# Patient Record
Sex: FEMALE | Race: ASIAN | Employment: UNEMPLOYED | URBAN - METROPOLITAN AREA
[De-identification: names, ages, dates, MRNs, and addresses within clinical notes are randomized per-mention and may not be internally consistent; named-entity substitution may affect disease eponyms.]

---

## 2020-01-01 ENCOUNTER — PATIENT OUTREACH (OUTPATIENT)
Dept: GASTROENTEROLOGY | Facility: CLINIC | Age: 50
End: 2020-01-01

## 2020-01-01 ENCOUNTER — TRANSFERRED RECORDS (OUTPATIENT)
Dept: HEALTH INFORMATION MANAGEMENT | Facility: CLINIC | Age: 50
End: 2020-01-01

## 2020-01-01 ENCOUNTER — ONCOLOGY VISIT (OUTPATIENT)
Dept: ONCOLOGY | Facility: CLINIC | Age: 50
End: 2020-01-01
Payer: MEDICARE

## 2020-01-01 ENCOUNTER — TELEPHONE (OUTPATIENT)
Dept: ONCOLOGY | Facility: CLINIC | Age: 50
End: 2020-01-01

## 2020-01-01 ENCOUNTER — VIRTUAL VISIT (OUTPATIENT)
Dept: INTERVENTIONAL RADIOLOGY/VASCULAR | Facility: CLINIC | Age: 50
End: 2020-01-01
Attending: INTERNAL MEDICINE
Payer: MEDICARE

## 2020-01-01 ENCOUNTER — PATIENT OUTREACH (OUTPATIENT)
Dept: ONCOLOGY | Facility: CLINIC | Age: 50
End: 2020-01-01

## 2020-01-01 ENCOUNTER — INFUSION THERAPY VISIT (OUTPATIENT)
Dept: INFUSION THERAPY | Facility: CLINIC | Age: 50
End: 2020-01-01
Payer: MEDICARE

## 2020-01-01 ENCOUNTER — OFFICE VISIT (OUTPATIENT)
Dept: FAMILY MEDICINE | Facility: CLINIC | Age: 50
End: 2020-01-01
Payer: MEDICARE

## 2020-01-01 ENCOUNTER — TELEPHONE (OUTPATIENT)
Dept: FAMILY MEDICINE | Facility: CLINIC | Age: 50
End: 2020-01-01

## 2020-01-01 ENCOUNTER — PRE VISIT (OUTPATIENT)
Dept: INTERVENTIONAL RADIOLOGY/VASCULAR | Facility: CLINIC | Age: 50
End: 2020-01-01

## 2020-01-01 ENCOUNTER — OFFICE VISIT (OUTPATIENT)
Dept: URGENT CARE | Facility: URGENT CARE | Age: 50
End: 2020-01-01
Payer: MEDICARE

## 2020-01-01 ENCOUNTER — TELEPHONE (OUTPATIENT)
Dept: INFECTIOUS DISEASES | Facility: CLINIC | Age: 50
End: 2020-01-01

## 2020-01-01 ENCOUNTER — VIRTUAL VISIT (OUTPATIENT)
Dept: ONCOLOGY | Facility: CLINIC | Age: 50
End: 2020-01-01
Attending: INTERNAL MEDICINE
Payer: MEDICARE

## 2020-01-01 ENCOUNTER — OFFICE VISIT (OUTPATIENT)
Dept: SURGERY | Facility: CLINIC | Age: 50
End: 2020-01-01
Attending: INTERNAL MEDICINE
Payer: MEDICARE

## 2020-01-01 ENCOUNTER — ANCILLARY PROCEDURE (OUTPATIENT)
Dept: CT IMAGING | Facility: CLINIC | Age: 50
End: 2020-01-01
Attending: INTERNAL MEDICINE
Payer: MEDICARE

## 2020-01-01 ENCOUNTER — PRE VISIT (OUTPATIENT)
Dept: SURGERY | Facility: CLINIC | Age: 50
End: 2020-01-01

## 2020-01-01 ENCOUNTER — ANESTHESIA (OUTPATIENT)
Dept: SURGERY | Facility: AMBULATORY SURGERY CENTER | Age: 50
End: 2020-01-01

## 2020-01-01 ENCOUNTER — PATIENT OUTREACH (OUTPATIENT)
Dept: CARE COORDINATION | Facility: CLINIC | Age: 50
End: 2020-01-01

## 2020-01-01 ENCOUNTER — COMMUNICATION - HEALTHEAST (OUTPATIENT)
Dept: SCHEDULING | Facility: CLINIC | Age: 50
End: 2020-01-01

## 2020-01-01 ENCOUNTER — OFFICE VISIT (OUTPATIENT)
Dept: ONCOLOGY | Facility: CLINIC | Age: 50
End: 2020-01-01
Payer: MEDICARE

## 2020-01-01 ENCOUNTER — HOSPITAL ENCOUNTER (OUTPATIENT)
Facility: CLINIC | Age: 50
Setting detail: SPECIMEN
Discharge: HOME | End: 2020-08-26
Admitting: INTERNAL MEDICINE
Payer: MEDICARE

## 2020-01-01 ENCOUNTER — TELEPHONE (OUTPATIENT)
Dept: GASTROENTEROLOGY | Facility: CLINIC | Age: 50
End: 2020-01-01

## 2020-01-01 ENCOUNTER — ANESTHESIA EVENT (OUTPATIENT)
Dept: SURGERY | Facility: AMBULATORY SURGERY CENTER | Age: 50
End: 2020-01-01

## 2020-01-01 ENCOUNTER — HOSPITAL ENCOUNTER (OUTPATIENT)
Facility: AMBULATORY SURGERY CENTER | Age: 50
End: 2020-08-03
Attending: INTERNAL MEDICINE
Payer: MEDICARE

## 2020-01-01 ENCOUNTER — PATIENT OUTREACH (OUTPATIENT)
Dept: SURGERY | Facility: CLINIC | Age: 50
End: 2020-01-01

## 2020-01-01 ENCOUNTER — PRE VISIT (OUTPATIENT)
Dept: ONCOLOGY | Facility: CLINIC | Age: 50
End: 2020-01-01

## 2020-01-01 ENCOUNTER — DOCUMENTATION ONLY (OUTPATIENT)
Dept: OTHER | Facility: CLINIC | Age: 50
End: 2020-01-01

## 2020-01-01 ENCOUNTER — HOSPITAL ENCOUNTER (OUTPATIENT)
Facility: CLINIC | Age: 50
End: 2020-01-01
Attending: INTERNAL MEDICINE | Admitting: INTERNAL MEDICINE
Payer: MEDICARE

## 2020-01-01 ENCOUNTER — APPOINTMENT (OUTPATIENT)
Dept: MEDSURG UNIT | Facility: CLINIC | Age: 50
End: 2020-01-01
Attending: INTERNAL MEDICINE
Payer: MEDICARE

## 2020-01-01 ENCOUNTER — MEDICAL CORRESPONDENCE (OUTPATIENT)
Dept: HEALTH INFORMATION MANAGEMENT | Facility: CLINIC | Age: 50
End: 2020-01-01

## 2020-01-01 ENCOUNTER — HOSPITAL ENCOUNTER (INPATIENT)
Facility: CLINIC | Age: 50
LOS: 3 days | Discharge: HOSPICE | DRG: 871 | End: 2020-12-11
Attending: EMERGENCY MEDICINE | Admitting: HOSPITALIST
Payer: MEDICARE

## 2020-01-01 ENCOUNTER — TELEPHONE (OUTPATIENT)
Dept: SURGERY | Facility: CLINIC | Age: 50
End: 2020-01-01

## 2020-01-01 ENCOUNTER — APPOINTMENT (OUTPATIENT)
Dept: GENERAL RADIOLOGY | Facility: CLINIC | Age: 50
DRG: 871 | End: 2020-01-01
Attending: HOSPITALIST
Payer: MEDICARE

## 2020-01-01 ENCOUNTER — HOSPITAL ENCOUNTER (OUTPATIENT)
Facility: CLINIC | Age: 50
Setting detail: SPECIMEN
Discharge: HOME | End: 2020-09-02
Admitting: INTERNAL MEDICINE
Payer: MEDICARE

## 2020-01-01 ENCOUNTER — AMBULATORY - HEALTHEAST (OUTPATIENT)
Dept: FAMILY MEDICINE | Facility: CLINIC | Age: 50
End: 2020-01-01

## 2020-01-01 ENCOUNTER — VIRTUAL VISIT (OUTPATIENT)
Dept: ONCOLOGY | Facility: CLINIC | Age: 50
End: 2020-01-01
Attending: NURSE PRACTITIONER
Payer: MEDICARE

## 2020-01-01 ENCOUNTER — TELEPHONE (OUTPATIENT)
Dept: INTERVENTIONAL RADIOLOGY/VASCULAR | Facility: CLINIC | Age: 50
End: 2020-01-01

## 2020-01-01 ENCOUNTER — AMBULATORY - HEALTHEAST (OUTPATIENT)
Dept: OTHER | Facility: CLINIC | Age: 50
End: 2020-01-01

## 2020-01-01 ENCOUNTER — PRE VISIT (OUTPATIENT)
Dept: INFECTIOUS DISEASES | Facility: CLINIC | Age: 50
End: 2020-01-01

## 2020-01-01 ENCOUNTER — HEALTH MAINTENANCE LETTER (OUTPATIENT)
Age: 50
End: 2020-01-01

## 2020-01-01 ENCOUNTER — INFUSION THERAPY VISIT (OUTPATIENT)
Dept: INFUSION THERAPY | Facility: CLINIC | Age: 50
End: 2020-01-01
Attending: INTERNAL MEDICINE
Payer: MEDICARE

## 2020-01-01 ENCOUNTER — ANCILLARY PROCEDURE (OUTPATIENT)
Dept: ULTRASOUND IMAGING | Facility: CLINIC | Age: 50
End: 2020-01-01
Attending: NURSE PRACTITIONER
Payer: MEDICARE

## 2020-01-01 ENCOUNTER — ANCILLARY PROCEDURE (OUTPATIENT)
Dept: GENERAL RADIOLOGY | Facility: CLINIC | Age: 50
End: 2020-01-01
Attending: NURSE PRACTITIONER
Payer: MEDICARE

## 2020-01-01 ENCOUNTER — APPOINTMENT (OUTPATIENT)
Dept: INTERVENTIONAL RADIOLOGY/VASCULAR | Facility: CLINIC | Age: 50
End: 2020-01-01
Attending: CLINICAL NURSE SPECIALIST
Payer: MEDICARE

## 2020-01-01 ENCOUNTER — HOSPITAL ENCOUNTER (OUTPATIENT)
Dept: CT IMAGING | Facility: CLINIC | Age: 50
Discharge: HOME | End: 2020-07-13
Attending: NURSE PRACTITIONER | Admitting: NURSE PRACTITIONER
Payer: MEDICARE

## 2020-01-01 ENCOUNTER — VIRTUAL VISIT (OUTPATIENT)
Dept: INFECTIOUS DISEASES | Facility: CLINIC | Age: 50
End: 2020-01-01
Attending: STUDENT IN AN ORGANIZED HEALTH CARE EDUCATION/TRAINING PROGRAM
Payer: MEDICARE

## 2020-01-01 ENCOUNTER — HOSPITAL ENCOUNTER (OUTPATIENT)
Facility: CLINIC | Age: 50
Discharge: HOME | End: 2020-07-31
Attending: INTERNAL MEDICINE | Admitting: RADIOLOGY
Payer: MEDICARE

## 2020-01-01 VITALS
SYSTOLIC BLOOD PRESSURE: 108 MMHG | HEART RATE: 67 BPM | TEMPERATURE: 96.6 F | DIASTOLIC BLOOD PRESSURE: 74 MMHG | OXYGEN SATURATION: 97 % | WEIGHT: 250.2 LBS | RESPIRATION RATE: 16 BRPM | BODY MASS INDEX: 44.32 KG/M2

## 2020-01-01 VITALS
OXYGEN SATURATION: 96 % | RESPIRATION RATE: 20 BRPM | HEART RATE: 80 BPM | SYSTOLIC BLOOD PRESSURE: 125 MMHG | TEMPERATURE: 98.1 F | DIASTOLIC BLOOD PRESSURE: 84 MMHG | WEIGHT: 267.4 LBS | BODY MASS INDEX: 47.37 KG/M2

## 2020-01-01 VITALS
BODY MASS INDEX: 39.69 KG/M2 | TEMPERATURE: 98 F | OXYGEN SATURATION: 95 % | RESPIRATION RATE: 16 BRPM | DIASTOLIC BLOOD PRESSURE: 76 MMHG | WEIGHT: 231.2 LBS | HEART RATE: 78 BPM | SYSTOLIC BLOOD PRESSURE: 114 MMHG

## 2020-01-01 VITALS
HEART RATE: 69 BPM | RESPIRATION RATE: 16 BRPM | WEIGHT: 222.5 LBS | BODY MASS INDEX: 38.19 KG/M2 | TEMPERATURE: 97.9 F | SYSTOLIC BLOOD PRESSURE: 101 MMHG | OXYGEN SATURATION: 98 % | DIASTOLIC BLOOD PRESSURE: 69 MMHG

## 2020-01-01 VITALS
RESPIRATION RATE: 16 BRPM | OXYGEN SATURATION: 97 % | DIASTOLIC BLOOD PRESSURE: 75 MMHG | BODY MASS INDEX: 38.28 KG/M2 | SYSTOLIC BLOOD PRESSURE: 115 MMHG | HEART RATE: 71 BPM | TEMPERATURE: 97.4 F | WEIGHT: 223 LBS

## 2020-01-01 VITALS
HEART RATE: 67 BPM | DIASTOLIC BLOOD PRESSURE: 61 MMHG | WEIGHT: 228.8 LBS | SYSTOLIC BLOOD PRESSURE: 102 MMHG | TEMPERATURE: 97.8 F | BODY MASS INDEX: 39.27 KG/M2 | RESPIRATION RATE: 18 BRPM

## 2020-01-01 VITALS
TEMPERATURE: 97.1 F | RESPIRATION RATE: 16 BRPM | DIASTOLIC BLOOD PRESSURE: 70 MMHG | OXYGEN SATURATION: 98 % | HEART RATE: 86 BPM | SYSTOLIC BLOOD PRESSURE: 116 MMHG

## 2020-01-01 VITALS
HEART RATE: 66 BPM | WEIGHT: 226.6 LBS | DIASTOLIC BLOOD PRESSURE: 82 MMHG | TEMPERATURE: 97.8 F | RESPIRATION RATE: 16 BRPM | WEIGHT: 219 LBS | TEMPERATURE: 98.3 F | BODY MASS INDEX: 38.9 KG/M2 | BODY MASS INDEX: 37.59 KG/M2 | DIASTOLIC BLOOD PRESSURE: 64 MMHG | OXYGEN SATURATION: 98 % | HEART RATE: 73 BPM | OXYGEN SATURATION: 97 % | SYSTOLIC BLOOD PRESSURE: 118 MMHG | SYSTOLIC BLOOD PRESSURE: 96 MMHG

## 2020-01-01 VITALS
TEMPERATURE: 98.3 F | WEIGHT: 220.3 LBS | OXYGEN SATURATION: 96 % | SYSTOLIC BLOOD PRESSURE: 95 MMHG | BODY MASS INDEX: 37.81 KG/M2 | DIASTOLIC BLOOD PRESSURE: 65 MMHG | HEART RATE: 67 BPM

## 2020-01-01 VITALS
SYSTOLIC BLOOD PRESSURE: 122 MMHG | BODY MASS INDEX: 40.03 KG/M2 | DIASTOLIC BLOOD PRESSURE: 81 MMHG | RESPIRATION RATE: 18 BRPM | OXYGEN SATURATION: 97 % | WEIGHT: 233.2 LBS | TEMPERATURE: 97.9 F | HEART RATE: 69 BPM

## 2020-01-01 VITALS
SYSTOLIC BLOOD PRESSURE: 127 MMHG | RESPIRATION RATE: 16 BRPM | TEMPERATURE: 96.7 F | WEIGHT: 245 LBS | HEIGHT: 63 IN | BODY MASS INDEX: 43.41 KG/M2 | HEART RATE: 70 BPM | OXYGEN SATURATION: 98 % | DIASTOLIC BLOOD PRESSURE: 84 MMHG

## 2020-01-01 VITALS
OXYGEN SATURATION: 95 % | BODY MASS INDEX: 40.06 KG/M2 | HEART RATE: 64 BPM | SYSTOLIC BLOOD PRESSURE: 95 MMHG | DIASTOLIC BLOOD PRESSURE: 66 MMHG | WEIGHT: 233.4 LBS | TEMPERATURE: 98.1 F | RESPIRATION RATE: 18 BRPM

## 2020-01-01 VITALS
SYSTOLIC BLOOD PRESSURE: 108 MMHG | HEART RATE: 78 BPM | OXYGEN SATURATION: 97 % | DIASTOLIC BLOOD PRESSURE: 73 MMHG | WEIGHT: 220 LBS | TEMPERATURE: 98 F | HEIGHT: 64 IN | BODY MASS INDEX: 37.56 KG/M2 | RESPIRATION RATE: 18 BRPM

## 2020-01-01 VITALS
TEMPERATURE: 98.2 F | SYSTOLIC BLOOD PRESSURE: 110 MMHG | BODY MASS INDEX: 39.22 KG/M2 | OXYGEN SATURATION: 97 % | HEART RATE: 99 BPM | WEIGHT: 229.72 LBS | RESPIRATION RATE: 12 BRPM | DIASTOLIC BLOOD PRESSURE: 54 MMHG | HEIGHT: 64 IN

## 2020-01-01 VITALS
DIASTOLIC BLOOD PRESSURE: 85 MMHG | BODY MASS INDEX: 39.93 KG/M2 | HEART RATE: 87 BPM | WEIGHT: 232.6 LBS | SYSTOLIC BLOOD PRESSURE: 126 MMHG | OXYGEN SATURATION: 98 % | TEMPERATURE: 98.1 F

## 2020-01-01 VITALS — BODY MASS INDEX: 44.29 KG/M2 | WEIGHT: 250 LBS

## 2020-01-01 VITALS
OXYGEN SATURATION: 98 % | TEMPERATURE: 97.9 F | SYSTOLIC BLOOD PRESSURE: 122 MMHG | HEART RATE: 70 BPM | RESPIRATION RATE: 18 BRPM | DIASTOLIC BLOOD PRESSURE: 68 MMHG

## 2020-01-01 VITALS
TEMPERATURE: 98.4 F | BODY MASS INDEX: 36.9 KG/M2 | OXYGEN SATURATION: 97 % | DIASTOLIC BLOOD PRESSURE: 81 MMHG | HEART RATE: 93 BPM | SYSTOLIC BLOOD PRESSURE: 114 MMHG | WEIGHT: 215 LBS

## 2020-01-01 VITALS — BODY MASS INDEX: 41.83 KG/M2 | WEIGHT: 245 LBS | HEIGHT: 64 IN

## 2020-01-01 VITALS — WEIGHT: 223 LBS | BODY MASS INDEX: 38.28 KG/M2

## 2020-01-01 VITALS — WEIGHT: 217 LBS | BODY MASS INDEX: 37.25 KG/M2

## 2020-01-01 VITALS — HEART RATE: 86 BPM

## 2020-01-01 DIAGNOSIS — Z11.59 ENCOUNTER FOR SCREENING FOR OTHER VIRAL DISEASES: ICD-10-CM

## 2020-01-01 DIAGNOSIS — Z11.59 ENCOUNTER FOR SCREENING FOR OTHER VIRAL DISEASES: Primary | ICD-10-CM

## 2020-01-01 DIAGNOSIS — C22.1 CHOLANGIOCARCINOMA (H): Primary | ICD-10-CM

## 2020-01-01 DIAGNOSIS — C24.9 PRIMARY MALIGNANT NEOPLASM OF BILIARY TRACT (H): Primary | ICD-10-CM

## 2020-01-01 DIAGNOSIS — R79.89 ELEVATED LFTS: Primary | ICD-10-CM

## 2020-01-01 DIAGNOSIS — Z51.5 HOSPICE CARE PATIENT: ICD-10-CM

## 2020-01-01 DIAGNOSIS — C22.1 CHOLANGIOCARCINOMA (H): ICD-10-CM

## 2020-01-01 DIAGNOSIS — E87.6 HYPOKALEMIA: ICD-10-CM

## 2020-01-01 DIAGNOSIS — Z20.828 EXPOSURE TO SARS-ASSOCIATED CORONAVIRUS: ICD-10-CM

## 2020-01-01 DIAGNOSIS — E66.01 MORBID OBESITY (H): ICD-10-CM

## 2020-01-01 DIAGNOSIS — R10.84 ABDOMINAL PAIN, GENERALIZED: Primary | ICD-10-CM

## 2020-01-01 DIAGNOSIS — C24.9 PRIMARY MALIGNANT NEOPLASM OF BILIARY TRACT (H): ICD-10-CM

## 2020-01-01 DIAGNOSIS — R10.9 ABDOMINAL PAIN, UNSPECIFIED ABDOMINAL LOCATION: Primary | ICD-10-CM

## 2020-01-01 DIAGNOSIS — Z11.4 ENCOUNTER FOR SCREENING FOR HIV: ICD-10-CM

## 2020-01-01 DIAGNOSIS — R14.0 BLOATING: ICD-10-CM

## 2020-01-01 DIAGNOSIS — K59.00 CONSTIPATION, UNSPECIFIED CONSTIPATION TYPE: ICD-10-CM

## 2020-01-01 DIAGNOSIS — B19.10 REACTIVATION OF HEPATITIS B VIRAL HEPATITIS: ICD-10-CM

## 2020-01-01 DIAGNOSIS — B18.1 CHRONIC VIRAL HEPATITIS B WITHOUT DELTA AGENT AND WITHOUT COMA (H): ICD-10-CM

## 2020-01-01 DIAGNOSIS — R16.0 LIVER MASS: Primary | ICD-10-CM

## 2020-01-01 DIAGNOSIS — Z83.3 FAMILY HISTORY OF DIABETES MELLITUS: ICD-10-CM

## 2020-01-01 DIAGNOSIS — I81 PORTAL VEIN THROMBOSIS: ICD-10-CM

## 2020-01-01 DIAGNOSIS — R10.84 ABDOMINAL PAIN, GENERALIZED: ICD-10-CM

## 2020-01-01 DIAGNOSIS — Z01.818 PREOP GENERAL PHYSICAL EXAM: Primary | ICD-10-CM

## 2020-01-01 DIAGNOSIS — R73.9 HYPERGLYCEMIA: ICD-10-CM

## 2020-01-01 DIAGNOSIS — R32 URINARY INCONTINENCE, UNSPECIFIED TYPE: ICD-10-CM

## 2020-01-01 DIAGNOSIS — R79.89 ELEVATED LFTS: ICD-10-CM

## 2020-01-01 DIAGNOSIS — R17 ELEVATED BILIRUBIN: ICD-10-CM

## 2020-01-01 DIAGNOSIS — R11.0 NAUSEA: ICD-10-CM

## 2020-01-01 DIAGNOSIS — N30.01 ACUTE CYSTITIS WITH HEMATURIA: ICD-10-CM

## 2020-01-01 DIAGNOSIS — R16.0 LIVER MASS: ICD-10-CM

## 2020-01-01 DIAGNOSIS — E87.20 LACTIC ACIDOSIS: ICD-10-CM

## 2020-01-01 DIAGNOSIS — E11.9 TYPE 2 DIABETES MELLITUS WITHOUT COMPLICATION, WITHOUT LONG-TERM CURRENT USE OF INSULIN (H): ICD-10-CM

## 2020-01-01 DIAGNOSIS — B18.1 CHRONIC VIRAL HEPATITIS B WITHOUT DELTA AGENT AND WITHOUT COMA (H): Primary | ICD-10-CM

## 2020-01-01 DIAGNOSIS — K76.9 LIVER LESION, RIGHT LOBE: ICD-10-CM

## 2020-01-01 DIAGNOSIS — K76.9 LIVER LESION, RIGHT LOBE: Primary | ICD-10-CM

## 2020-01-01 DIAGNOSIS — K21.00 GASTROESOPHAGEAL REFLUX DISEASE WITH ESOPHAGITIS: ICD-10-CM

## 2020-01-01 DIAGNOSIS — R10.9 ABDOMINAL PAIN, UNSPECIFIED ABDOMINAL LOCATION: ICD-10-CM

## 2020-01-01 LAB
ABO + RH BLD: NORMAL
ABO + RH BLD: NORMAL
ALBUMIN SERPL-MCNC: 1.7 G/DL (ref 3.4–5)
ALBUMIN SERPL-MCNC: 1.9 G/DL (ref 3.4–5)
ALBUMIN SERPL-MCNC: 2.1 G/DL (ref 3.4–5)
ALBUMIN SERPL-MCNC: 2.2 G/DL (ref 3.4–5)
ALBUMIN SERPL-MCNC: 2.2 G/DL (ref 3.4–5)
ALBUMIN SERPL-MCNC: 2.3 G/DL (ref 3.4–5)
ALBUMIN SERPL-MCNC: 2.8 G/DL (ref 3.4–5)
ALBUMIN SERPL-MCNC: 2.9 G/DL (ref 3.4–5)
ALBUMIN SERPL-MCNC: 3 G/DL (ref 3.4–5)
ALBUMIN SERPL-MCNC: 3 G/DL (ref 3.4–5)
ALBUMIN SERPL-MCNC: 3.2 G/DL (ref 3.4–5)
ALBUMIN SERPL-MCNC: 3.3 G/DL (ref 3.4–5)
ALBUMIN SERPL-MCNC: 3.3 G/DL (ref 3.4–5)
ALBUMIN SERPL-MCNC: 3.8 G/DL (ref 3.4–5)
ALBUMIN UR-MCNC: NEGATIVE MG/DL
ALBUMIN UR-MCNC: NEGATIVE MG/DL
ALP SERPL-CCNC: 110 U/L (ref 40–150)
ALP SERPL-CCNC: 114 U/L (ref 40–150)
ALP SERPL-CCNC: 115 U/L (ref 40–150)
ALP SERPL-CCNC: 124 U/L (ref 40–150)
ALP SERPL-CCNC: 124 U/L (ref 40–150)
ALP SERPL-CCNC: 132 U/L (ref 40–150)
ALP SERPL-CCNC: 152 U/L (ref 40–150)
ALP SERPL-CCNC: 153 U/L (ref 40–150)
ALP SERPL-CCNC: 154 U/L (ref 40–150)
ALP SERPL-CCNC: 158 U/L (ref 40–150)
ALP SERPL-CCNC: 167 U/L (ref 40–150)
ALP SERPL-CCNC: 225 U/L (ref 40–150)
ALP SERPL-CCNC: 69 U/L (ref 40–150)
ALP SERPL-CCNC: 88 U/L (ref 40–150)
ALT SERPL W P-5'-P-CCNC: 116 U/L (ref 0–50)
ALT SERPL W P-5'-P-CCNC: 129 U/L (ref 0–50)
ALT SERPL W P-5'-P-CCNC: 213 U/L (ref 0–50)
ALT SERPL W P-5'-P-CCNC: 305 U/L (ref 0–50)
ALT SERPL W P-5'-P-CCNC: 33 U/L (ref 0–50)
ALT SERPL W P-5'-P-CCNC: 38 U/L (ref 0–50)
ALT SERPL W P-5'-P-CCNC: 43 U/L (ref 0–50)
ALT SERPL W P-5'-P-CCNC: 43 U/L (ref 0–50)
ALT SERPL W P-5'-P-CCNC: 51 U/L (ref 0–50)
ALT SERPL W P-5'-P-CCNC: 54 U/L (ref 0–50)
ALT SERPL W P-5'-P-CCNC: 68 U/L (ref 0–50)
ALT SERPL W P-5'-P-CCNC: 78 U/L (ref 0–50)
ALT SERPL W P-5'-P-CCNC: 80 U/L (ref 0–50)
ALT SERPL W P-5'-P-CCNC: 89 U/L (ref 0–50)
AMMONIA PLAS-SCNC: 46 UMOL/L (ref 10–50)
ANION GAP SERPL CALCULATED.3IONS-SCNC: 10 MMOL/L (ref 3–14)
ANION GAP SERPL CALCULATED.3IONS-SCNC: 3 MMOL/L (ref 3–14)
ANION GAP SERPL CALCULATED.3IONS-SCNC: 4 MMOL/L (ref 3–14)
ANION GAP SERPL CALCULATED.3IONS-SCNC: 5 MMOL/L (ref 3–14)
ANION GAP SERPL CALCULATED.3IONS-SCNC: 6 MMOL/L (ref 3–14)
ANION GAP SERPL CALCULATED.3IONS-SCNC: 6 MMOL/L (ref 3–14)
ANION GAP SERPL CALCULATED.3IONS-SCNC: 7 MMOL/L (ref 3–14)
ANISOCYTOSIS BLD QL SMEAR: ABNORMAL
APPEARANCE UR: CLEAR
APPEARANCE UR: CLEAR
APTT PPP: 36 SEC (ref 22–37)
AST SERPL W P-5'-P-CCNC: 124 U/L (ref 0–45)
AST SERPL W P-5'-P-CCNC: 279 U/L (ref 0–45)
AST SERPL W P-5'-P-CCNC: 407 U/L (ref 0–45)
AST SERPL W P-5'-P-CCNC: 41 U/L (ref 0–45)
AST SERPL W P-5'-P-CCNC: 45 U/L (ref 0–45)
AST SERPL W P-5'-P-CCNC: 53 U/L (ref 0–45)
AST SERPL W P-5'-P-CCNC: 57 U/L (ref 0–45)
AST SERPL W P-5'-P-CCNC: 58 U/L (ref 0–45)
AST SERPL W P-5'-P-CCNC: 61 U/L (ref 0–45)
AST SERPL W P-5'-P-CCNC: 68 U/L (ref 0–45)
AST SERPL W P-5'-P-CCNC: 70 U/L (ref 0–45)
AST SERPL W P-5'-P-CCNC: 71 U/L (ref 0–45)
AST SERPL W P-5'-P-CCNC: 72 U/L (ref 0–45)
AST SERPL W P-5'-P-CCNC: 90 U/L (ref 0–45)
B-HCG SERPL-ACNC: <1 IU/L (ref 0–5)
BACTERIA #/AREA URNS HPF: ABNORMAL /HPF
BACTERIA SPEC CULT: ABNORMAL
BACTERIA SPEC CULT: NO GROWTH
BACTERIA SPEC CULT: NO GROWTH
BACTERIA SPEC CULT: NORMAL
BASOPHILS # BLD AUTO: 0 10E9/L (ref 0–0.2)
BASOPHILS NFR BLD AUTO: 0 %
BASOPHILS NFR BLD AUTO: 0.2 %
BASOPHILS NFR BLD AUTO: 0.3 %
BASOPHILS NFR BLD AUTO: 0.4 %
BASOPHILS NFR BLD AUTO: 0.5 %
BASOPHILS NFR BLD AUTO: 0.5 %
BASOPHILS NFR BLD AUTO: 0.6 %
BASOPHILS NFR BLD AUTO: 0.6 %
BILIRUB DIRECT SERPL-MCNC: 2.4 MG/DL (ref 0–0.2)
BILIRUB SERPL-MCNC: 0.7 MG/DL (ref 0.2–1.3)
BILIRUB SERPL-MCNC: 0.8 MG/DL (ref 0.2–1.3)
BILIRUB SERPL-MCNC: 0.8 MG/DL (ref 0.2–1.3)
BILIRUB SERPL-MCNC: 1.1 MG/DL (ref 0.2–1.3)
BILIRUB SERPL-MCNC: 1.2 MG/DL (ref 0.2–1.3)
BILIRUB SERPL-MCNC: 1.3 MG/DL (ref 0.2–1.3)
BILIRUB SERPL-MCNC: 1.3 MG/DL (ref 0.2–1.3)
BILIRUB SERPL-MCNC: 1.4 MG/DL (ref 0.2–1.3)
BILIRUB SERPL-MCNC: 1.9 MG/DL (ref 0.2–1.3)
BILIRUB SERPL-MCNC: 2 MG/DL (ref 0.2–1.3)
BILIRUB SERPL-MCNC: 3.2 MG/DL (ref 0.2–1.3)
BILIRUB SERPL-MCNC: 4.6 MG/DL (ref 0.2–1.3)
BILIRUB UR QL STRIP: NEGATIVE
BILIRUB UR QL STRIP: NEGATIVE
BLD GP AB SCN SERPL QL: NORMAL
BLOOD BANK CMNT PATIENT-IMP: NORMAL
BUN SERPL-MCNC: 10 MG/DL (ref 7–30)
BUN SERPL-MCNC: 12 MG/DL (ref 7–30)
BUN SERPL-MCNC: 13 MG/DL (ref 7–30)
BUN SERPL-MCNC: 15 MG/DL (ref 7–30)
BUN SERPL-MCNC: 16 MG/DL (ref 7–30)
BUN SERPL-MCNC: 19 MG/DL (ref 7–30)
BUN SERPL-MCNC: 24 MG/DL (ref 7–30)
BUN SERPL-MCNC: 28 MG/DL (ref 7–30)
BUN SERPL-MCNC: 9 MG/DL (ref 7–30)
BUN SERPL-MCNC: 9 MG/DL (ref 7–30)
CALCIUM SERPL-MCNC: 7.8 MG/DL (ref 8.5–10.1)
CALCIUM SERPL-MCNC: 7.9 MG/DL (ref 8.5–10.1)
CALCIUM SERPL-MCNC: 8.1 MG/DL (ref 8.5–10.1)
CALCIUM SERPL-MCNC: 8.3 MG/DL (ref 8.5–10.1)
CALCIUM SERPL-MCNC: 8.3 MG/DL (ref 8.5–10.1)
CALCIUM SERPL-MCNC: 8.4 MG/DL (ref 8.5–10.1)
CALCIUM SERPL-MCNC: 8.5 MG/DL (ref 8.5–10.1)
CALCIUM SERPL-MCNC: 8.8 MG/DL (ref 8.5–10.1)
CALCIUM SERPL-MCNC: 8.8 MG/DL (ref 8.5–10.1)
CALCIUM SERPL-MCNC: 8.9 MG/DL (ref 8.5–10.1)
CALCIUM SERPL-MCNC: 9 MG/DL (ref 8.5–10.1)
CALCIUM SERPL-MCNC: 9.2 MG/DL (ref 8.5–10.1)
CHLORIDE SERPL-SCNC: 102 MMOL/L (ref 94–109)
CHLORIDE SERPL-SCNC: 103 MMOL/L (ref 94–109)
CHLORIDE SERPL-SCNC: 104 MMOL/L (ref 94–109)
CHLORIDE SERPL-SCNC: 105 MMOL/L (ref 94–109)
CHLORIDE SERPL-SCNC: 106 MMOL/L (ref 94–109)
CHLORIDE SERPL-SCNC: 106 MMOL/L (ref 94–109)
CHLORIDE SERPL-SCNC: 107 MMOL/L (ref 94–109)
CHLORIDE SERPL-SCNC: 108 MMOL/L (ref 94–109)
CHLORIDE SERPL-SCNC: 109 MMOL/L (ref 94–109)
CHLORIDE SERPL-SCNC: 98 MMOL/L (ref 94–109)
CHLORIDE SERPL-SCNC: 99 MMOL/L (ref 94–109)
CHLORIDE SERPL-SCNC: 99 MMOL/L (ref 94–109)
CO2 SERPL-SCNC: 25 MMOL/L (ref 20–32)
CO2 SERPL-SCNC: 25 MMOL/L (ref 20–32)
CO2 SERPL-SCNC: 26 MMOL/L (ref 20–32)
CO2 SERPL-SCNC: 27 MMOL/L (ref 20–32)
CO2 SERPL-SCNC: 27 MMOL/L (ref 20–32)
CO2 SERPL-SCNC: 28 MMOL/L (ref 20–32)
CO2 SERPL-SCNC: 28 MMOL/L (ref 20–32)
CO2 SERPL-SCNC: 29 MMOL/L (ref 20–32)
CO2 SERPL-SCNC: 30 MMOL/L (ref 20–32)
CO2 SERPL-SCNC: 34 MMOL/L (ref 20–32)
COLOR UR AUTO: YELLOW
COLOR UR AUTO: YELLOW
COPATH REPORT: NORMAL
COPATH REPORT: NORMAL
CREAT SERPL-MCNC: 0.55 MG/DL (ref 0.52–1.04)
CREAT SERPL-MCNC: 0.58 MG/DL (ref 0.52–1.04)
CREAT SERPL-MCNC: 0.63 MG/DL (ref 0.52–1.04)
CREAT SERPL-MCNC: 0.63 MG/DL (ref 0.52–1.04)
CREAT SERPL-MCNC: 0.66 MG/DL (ref 0.52–1.04)
CREAT SERPL-MCNC: 0.66 MG/DL (ref 0.52–1.04)
CREAT SERPL-MCNC: 0.68 MG/DL (ref 0.52–1.04)
CREAT SERPL-MCNC: 0.72 MG/DL (ref 0.52–1.04)
CREAT SERPL-MCNC: 0.72 MG/DL (ref 0.52–1.04)
CREAT SERPL-MCNC: 0.74 MG/DL (ref 0.52–1.04)
CREAT SERPL-MCNC: 0.74 MG/DL (ref 0.52–1.04)
CREAT SERPL-MCNC: 0.77 MG/DL (ref 0.52–1.04)
CREAT SERPL-MCNC: 0.8 MG/DL (ref 0.52–1.04)
CREAT SERPL-MCNC: 0.8 MG/DL (ref 0.52–1.04)
DIFFERENTIAL METHOD BLD: ABNORMAL
EOSINOPHIL # BLD AUTO: 0 10E9/L (ref 0–0.7)
EOSINOPHIL # BLD AUTO: 0.1 10E9/L (ref 0–0.7)
EOSINOPHIL # BLD AUTO: 0.2 10E9/L (ref 0–0.7)
EOSINOPHIL NFR BLD AUTO: 0 %
EOSINOPHIL NFR BLD AUTO: 0.5 %
EOSINOPHIL NFR BLD AUTO: 0.6 %
EOSINOPHIL NFR BLD AUTO: 1.5 %
EOSINOPHIL NFR BLD AUTO: 1.7 %
EOSINOPHIL NFR BLD AUTO: 1.8 %
EOSINOPHIL NFR BLD AUTO: 1.9 %
EOSINOPHIL NFR BLD AUTO: 2.2 %
EOSINOPHIL NFR BLD AUTO: 2.4 %
EOSINOPHIL NFR BLD AUTO: 2.5 %
EOSINOPHIL NFR BLD AUTO: 3.3 %
ERYTHROCYTE [DISTWIDTH] IN BLOOD BY AUTOMATED COUNT: 12.7 % (ref 10–15)
ERYTHROCYTE [DISTWIDTH] IN BLOOD BY AUTOMATED COUNT: 13 % (ref 10–15)
ERYTHROCYTE [DISTWIDTH] IN BLOOD BY AUTOMATED COUNT: 13.3 % (ref 10–15)
ERYTHROCYTE [DISTWIDTH] IN BLOOD BY AUTOMATED COUNT: 13.7 % (ref 10–15)
ERYTHROCYTE [DISTWIDTH] IN BLOOD BY AUTOMATED COUNT: 13.8 % (ref 10–15)
ERYTHROCYTE [DISTWIDTH] IN BLOOD BY AUTOMATED COUNT: 15 % (ref 10–15)
ERYTHROCYTE [DISTWIDTH] IN BLOOD BY AUTOMATED COUNT: 15.6 % (ref 10–15)
ERYTHROCYTE [DISTWIDTH] IN BLOOD BY AUTOMATED COUNT: 16.5 % (ref 10–15)
ERYTHROCYTE [DISTWIDTH] IN BLOOD BY AUTOMATED COUNT: 16.7 % (ref 10–15)
ERYTHROCYTE [DISTWIDTH] IN BLOOD BY AUTOMATED COUNT: 16.7 % (ref 10–15)
ERYTHROCYTE [DISTWIDTH] IN BLOOD BY AUTOMATED COUNT: 17.2 % (ref 10–15)
ERYTHROCYTE [DISTWIDTH] IN BLOOD BY AUTOMATED COUNT: 17.3 % (ref 10–15)
ERYTHROCYTE [DISTWIDTH] IN BLOOD BY AUTOMATED COUNT: 18.9 % (ref 10–15)
ERYTHROCYTE [DISTWIDTH] IN BLOOD BY AUTOMATED COUNT: 19.5 % (ref 10–15)
ERYTHROCYTE [DISTWIDTH] IN BLOOD BY AUTOMATED COUNT: 19.6 % (ref 10–15)
FIBRINOGEN PPP-MCNC: 270 MG/DL (ref 200–420)
FIBRINOGEN PPP-MCNC: 277 MG/DL (ref 200–420)
GFR SERPL CREATININE-BSD FRML MDRD: 87 ML/MIN/{1.73_M2}
GFR SERPL CREATININE-BSD FRML MDRD: 88 ML/MIN/{1.73_M2}
GFR SERPL CREATININE-BSD FRML MDRD: >90 ML/MIN/{1.73_M2}
GLUCOSE BLDC GLUCOMTR-MCNC: 138 MG/DL (ref 70–99)
GLUCOSE SERPL-MCNC: 101 MG/DL (ref 70–99)
GLUCOSE SERPL-MCNC: 104 MG/DL (ref 70–99)
GLUCOSE SERPL-MCNC: 109 MG/DL (ref 70–99)
GLUCOSE SERPL-MCNC: 110 MG/DL (ref 70–99)
GLUCOSE SERPL-MCNC: 119 MG/DL (ref 70–99)
GLUCOSE SERPL-MCNC: 120 MG/DL (ref 70–99)
GLUCOSE SERPL-MCNC: 126 MG/DL (ref 70–99)
GLUCOSE SERPL-MCNC: 136 MG/DL (ref 70–99)
GLUCOSE SERPL-MCNC: 142 MG/DL (ref 70–99)
GLUCOSE SERPL-MCNC: 149 MG/DL (ref 70–99)
GLUCOSE SERPL-MCNC: 182 MG/DL (ref 70–99)
GLUCOSE SERPL-MCNC: 183 MG/DL (ref 70–99)
GLUCOSE SERPL-MCNC: 95 MG/DL (ref 70–99)
GLUCOSE SERPL-MCNC: 97 MG/DL (ref 70–99)
GLUCOSE UR STRIP-MCNC: NEGATIVE MG/DL
GLUCOSE UR STRIP-MCNC: NEGATIVE MG/DL
HBA1C MFR BLD: 6.6 % (ref 0–5.6)
HBV DNA SERPL NAA+PROBE-ACNC: 6126 [IU]/ML
HBV DNA SERPL NAA+PROBE-ACNC: ABNORMAL [IU]/ML
HBV DNA SERPL NAA+PROBE-ACNC: NORMAL [IU]/ML
HBV DNA SERPL NAA+PROBE-LOG IU: 3.8 {LOG_IU}/ML
HBV DNA SERPL NAA+PROBE-LOG IU: 4.5 {LOG_IU}/ML
HBV DNA SERPL NAA+PROBE-LOG IU: NORMAL {LOG_IU}/ML
HBV E AB SERPL QL IA: POSITIVE
HBV E AG SERPL QL IA: NEGATIVE
HBV SURFACE AB SERPL IA-ACNC: 0 M[IU]/ML
HBV SURFACE AG SERPL QL IA: REACTIVE
HCG UR QL: NEGATIVE
HCT VFR BLD AUTO: 31.5 % (ref 35–47)
HCT VFR BLD AUTO: 34.4 % (ref 35–47)
HCT VFR BLD AUTO: 35.1 % (ref 35–47)
HCT VFR BLD AUTO: 35.8 % (ref 35–47)
HCT VFR BLD AUTO: 35.8 % (ref 35–47)
HCT VFR BLD AUTO: 36.1 % (ref 35–47)
HCT VFR BLD AUTO: 37.5 % (ref 35–47)
HCT VFR BLD AUTO: 38.1 % (ref 35–47)
HCT VFR BLD AUTO: 38.8 % (ref 35–47)
HCT VFR BLD AUTO: 40.4 % (ref 35–47)
HCT VFR BLD AUTO: 40.9 % (ref 35–47)
HCT VFR BLD AUTO: 42.4 % (ref 35–47)
HCT VFR BLD AUTO: 46.1 % (ref 35–47)
HCT VFR BLD AUTO: 46.1 % (ref 35–47)
HCT VFR BLD AUTO: 46.7 % (ref 35–47)
HCV AB SERPL QL IA: NONREACTIVE
HDV AB SER QL: NEGATIVE
HGB BLD-MCNC: 10.7 G/DL (ref 11.7–15.7)
HGB BLD-MCNC: 11.5 G/DL (ref 11.7–15.7)
HGB BLD-MCNC: 12 G/DL (ref 11.7–15.7)
HGB BLD-MCNC: 12 G/DL (ref 11.7–15.7)
HGB BLD-MCNC: 12.1 G/DL (ref 11.7–15.7)
HGB BLD-MCNC: 12.1 G/DL (ref 11.7–15.7)
HGB BLD-MCNC: 12.3 G/DL (ref 11.7–15.7)
HGB BLD-MCNC: 12.5 G/DL (ref 11.7–15.7)
HGB BLD-MCNC: 12.5 G/DL (ref 11.7–15.7)
HGB BLD-MCNC: 13.2 G/DL (ref 11.7–15.7)
HGB BLD-MCNC: 13.4 G/DL (ref 11.7–15.7)
HGB BLD-MCNC: 13.5 G/DL (ref 11.7–15.7)
HGB BLD-MCNC: 14.9 G/DL (ref 11.7–15.7)
HGB BLD-MCNC: 15 G/DL (ref 11.7–15.7)
HGB BLD-MCNC: 15.2 G/DL (ref 11.7–15.7)
HGB UR QL STRIP: ABNORMAL
HGB UR QL STRIP: NEGATIVE
HIV 1+2 AB+HIV1 P24 AG SERPL QL IA: NONREACTIVE
IMM GRANULOCYTES # BLD: 0 10E9/L (ref 0–0.4)
IMM GRANULOCYTES NFR BLD: 0 %
IMM GRANULOCYTES NFR BLD: 0.2 %
IMM GRANULOCYTES NFR BLD: 0.3 %
IMM GRANULOCYTES NFR BLD: 0.6 %
INR PPP: 1.19 (ref 0.86–1.14)
INR PPP: 1.36 (ref 0.86–1.14)
INR PPP: 1.67 (ref 0.86–1.14)
INR PPP: 1.84 (ref 0.86–1.14)
INTERPRETATION ECG - MUSE: NORMAL
KETONES UR STRIP-MCNC: NEGATIVE MG/DL
KETONES UR STRIP-MCNC: NEGATIVE MG/DL
LAB SCANNED RESULT: NORMAL
LAB SCANNED RESULT: NORMAL
LABORATORY COMMENT REPORT: NORMAL
LABORATORY COMMENT REPORT: NORMAL
LACTATE BLD-SCNC: 2.4 MMOL/L (ref 0.7–2)
LACTATE BLD-SCNC: 2.8 MMOL/L (ref 0.7–2)
LACTATE BLD-SCNC: 3.9 MMOL/L (ref 0.7–2)
LACTATE BLD-SCNC: 5.9 MMOL/L (ref 0.7–2)
LACTATE BLD-SCNC: 5.9 MMOL/L (ref 0.7–2)
LEUKOCYTE ESTERASE UR QL STRIP: ABNORMAL
LEUKOCYTE ESTERASE UR QL STRIP: NEGATIVE
LIPASE SERPL-CCNC: 152 U/L (ref 73–393)
LIPASE SERPL-CCNC: 55 U/L (ref 73–393)
LOCATION PERFORMED: NORMAL
LYMPHOCYTES # BLD AUTO: 0.3 10E9/L (ref 0.8–5.3)
LYMPHOCYTES # BLD AUTO: 0.9 10E9/L (ref 0.8–5.3)
LYMPHOCYTES # BLD AUTO: 1 10E9/L (ref 0.8–5.3)
LYMPHOCYTES # BLD AUTO: 1 10E9/L (ref 0.8–5.3)
LYMPHOCYTES # BLD AUTO: 1.1 10E9/L (ref 0.8–5.3)
LYMPHOCYTES # BLD AUTO: 1.2 10E9/L (ref 0.8–5.3)
LYMPHOCYTES # BLD AUTO: 1.4 10E9/L (ref 0.8–5.3)
LYMPHOCYTES # BLD AUTO: 1.7 10E9/L (ref 0.8–5.3)
LYMPHOCYTES # BLD AUTO: 1.9 10E9/L (ref 0.8–5.3)
LYMPHOCYTES NFR BLD AUTO: 20.3 %
LYMPHOCYTES NFR BLD AUTO: 21.7 %
LYMPHOCYTES NFR BLD AUTO: 22.4 %
LYMPHOCYTES NFR BLD AUTO: 22.5 %
LYMPHOCYTES NFR BLD AUTO: 22.6 %
LYMPHOCYTES NFR BLD AUTO: 24 %
LYMPHOCYTES NFR BLD AUTO: 24.2 %
LYMPHOCYTES NFR BLD AUTO: 25.4 %
LYMPHOCYTES NFR BLD AUTO: 26.5 %
LYMPHOCYTES NFR BLD AUTO: 28.1 %
LYMPHOCYTES NFR BLD AUTO: 28.5 %
LYMPHOCYTES NFR BLD AUTO: 30.2 %
LYMPHOCYTES NFR BLD AUTO: 30.8 %
LYMPHOCYTES NFR BLD AUTO: 5.1 %
MACROCYTES BLD QL SMEAR: PRESENT
MAGNESIUM SERPL-MCNC: 1.7 MG/DL (ref 1.6–2.3)
MAGNESIUM SERPL-MCNC: 1.8 MG/DL (ref 1.6–2.3)
MAGNESIUM SERPL-MCNC: 1.9 MG/DL (ref 1.6–2.3)
MAGNESIUM SERPL-MCNC: 2.1 MG/DL (ref 1.6–2.3)
MAGNESIUM SERPL-MCNC: 2.1 MG/DL (ref 1.6–2.3)
MAGNESIUM SERPL-MCNC: 2.2 MG/DL (ref 1.6–2.3)
MAGNESIUM SERPL-MCNC: 2.3 MG/DL (ref 1.6–2.3)
MAGNESIUM SERPL-MCNC: 2.3 MG/DL (ref 1.6–2.3)
MCH RBC QN AUTO: 28.8 PG (ref 26.5–33)
MCH RBC QN AUTO: 29 PG (ref 26.5–33)
MCH RBC QN AUTO: 29.1 PG (ref 26.5–33)
MCH RBC QN AUTO: 29.4 PG (ref 26.5–33)
MCH RBC QN AUTO: 29.8 PG (ref 26.5–33)
MCH RBC QN AUTO: 30.3 PG (ref 26.5–33)
MCH RBC QN AUTO: 30.4 PG (ref 26.5–33)
MCH RBC QN AUTO: 31.5 PG (ref 26.5–33)
MCH RBC QN AUTO: 32.2 PG (ref 26.5–33)
MCH RBC QN AUTO: 33 PG (ref 26.5–33)
MCH RBC QN AUTO: 33.8 PG (ref 26.5–33)
MCH RBC QN AUTO: 34.5 PG (ref 26.5–33)
MCH RBC QN AUTO: 34.7 PG (ref 26.5–33)
MCHC RBC AUTO-ENTMCNC: 31.8 G/DL (ref 31.5–36.5)
MCHC RBC AUTO-ENTMCNC: 32.2 G/DL (ref 31.5–36.5)
MCHC RBC AUTO-ENTMCNC: 32.3 G/DL (ref 31.5–36.5)
MCHC RBC AUTO-ENTMCNC: 32.3 G/DL (ref 31.5–36.5)
MCHC RBC AUTO-ENTMCNC: 32.5 G/DL (ref 31.5–36.5)
MCHC RBC AUTO-ENTMCNC: 32.5 G/DL (ref 31.5–36.5)
MCHC RBC AUTO-ENTMCNC: 32.8 G/DL (ref 31.5–36.5)
MCHC RBC AUTO-ENTMCNC: 32.8 G/DL (ref 31.5–36.5)
MCHC RBC AUTO-ENTMCNC: 33.2 G/DL (ref 31.5–36.5)
MCHC RBC AUTO-ENTMCNC: 33.4 G/DL (ref 31.5–36.5)
MCHC RBC AUTO-ENTMCNC: 33.5 G/DL (ref 31.5–36.5)
MCHC RBC AUTO-ENTMCNC: 33.5 G/DL (ref 31.5–36.5)
MCHC RBC AUTO-ENTMCNC: 33.8 G/DL (ref 31.5–36.5)
MCHC RBC AUTO-ENTMCNC: 34 G/DL (ref 31.5–36.5)
MCHC RBC AUTO-ENTMCNC: 34.2 G/DL (ref 31.5–36.5)
MCV RBC AUTO: 101 FL (ref 78–100)
MCV RBC AUTO: 102 FL (ref 78–100)
MCV RBC AUTO: 102 FL (ref 78–100)
MCV RBC AUTO: 88 FL (ref 78–100)
MCV RBC AUTO: 89 FL (ref 78–100)
MCV RBC AUTO: 90 FL (ref 78–100)
MCV RBC AUTO: 91 FL (ref 78–100)
MCV RBC AUTO: 91 FL (ref 78–100)
MCV RBC AUTO: 93 FL (ref 78–100)
MCV RBC AUTO: 94 FL (ref 78–100)
MCV RBC AUTO: 94 FL (ref 78–100)
MCV RBC AUTO: 96 FL (ref 78–100)
MCV RBC AUTO: 96 FL (ref 78–100)
MISMATCH REPAIR BY IMMUNOHISTOCHEMISTRY: NORMAL
MONOCYTES # BLD AUTO: 0.2 10E9/L (ref 0–1.3)
MONOCYTES # BLD AUTO: 0.4 10E9/L (ref 0–1.3)
MONOCYTES # BLD AUTO: 0.5 10E9/L (ref 0–1.3)
MONOCYTES # BLD AUTO: 0.8 10E9/L (ref 0–1.3)
MONOCYTES NFR BLD AUTO: 10.3 %
MONOCYTES NFR BLD AUTO: 10.3 %
MONOCYTES NFR BLD AUTO: 10.6 %
MONOCYTES NFR BLD AUTO: 10.8 %
MONOCYTES NFR BLD AUTO: 11.4 %
MONOCYTES NFR BLD AUTO: 11.6 %
MONOCYTES NFR BLD AUTO: 11.9 %
MONOCYTES NFR BLD AUTO: 14.2 %
MONOCYTES NFR BLD AUTO: 4.2 %
MONOCYTES NFR BLD AUTO: 6 %
MONOCYTES NFR BLD AUTO: 7.6 %
MONOCYTES NFR BLD AUTO: 7.9 %
MONOCYTES NFR BLD AUTO: 8.9 %
MONOCYTES NFR BLD AUTO: 8.9 %
MUCOUS THREADS #/AREA URNS LPF: PRESENT /LPF
MUCOUS THREADS #/AREA URNS LPF: PRESENT /LPF
NEUTROPHILS # BLD AUTO: 2 10E9/L (ref 1.6–8.3)
NEUTROPHILS # BLD AUTO: 2.1 10E9/L (ref 1.6–8.3)
NEUTROPHILS # BLD AUTO: 2.4 10E9/L (ref 1.6–8.3)
NEUTROPHILS # BLD AUTO: 2.6 10E9/L (ref 1.6–8.3)
NEUTROPHILS # BLD AUTO: 2.6 10E9/L (ref 1.6–8.3)
NEUTROPHILS # BLD AUTO: 3 10E9/L (ref 1.6–8.3)
NEUTROPHILS # BLD AUTO: 3.2 10E9/L (ref 1.6–8.3)
NEUTROPHILS # BLD AUTO: 3.3 10E9/L (ref 1.6–8.3)
NEUTROPHILS # BLD AUTO: 3.6 10E9/L (ref 1.6–8.3)
NEUTROPHILS # BLD AUTO: 3.6 10E9/L (ref 1.6–8.3)
NEUTROPHILS # BLD AUTO: 4.3 10E9/L (ref 1.6–8.3)
NEUTROPHILS # BLD AUTO: 4.7 10E9/L (ref 1.6–8.3)
NEUTROPHILS NFR BLD AUTO: 57.2 %
NEUTROPHILS NFR BLD AUTO: 59.4 %
NEUTROPHILS NFR BLD AUTO: 60 %
NEUTROPHILS NFR BLD AUTO: 60.3 %
NEUTROPHILS NFR BLD AUTO: 61 %
NEUTROPHILS NFR BLD AUTO: 61.4 %
NEUTROPHILS NFR BLD AUTO: 62.5 %
NEUTROPHILS NFR BLD AUTO: 63.1 %
NEUTROPHILS NFR BLD AUTO: 63.4 %
NEUTROPHILS NFR BLD AUTO: 63.4 %
NEUTROPHILS NFR BLD AUTO: 63.9 %
NEUTROPHILS NFR BLD AUTO: 66.8 %
NEUTROPHILS NFR BLD AUTO: 69.3 %
NEUTROPHILS NFR BLD AUTO: 90.7 %
NITRATE UR QL: NEGATIVE
NITRATE UR QL: NEGATIVE
NON-SQ EPI CELLS #/AREA URNS LPF: ABNORMAL /LPF
NRBC # BLD AUTO: 0 10*3/UL
NRBC # BLD AUTO: 0 10*3/UL
NRBC BLD AUTO-RTO: 0 /100
NRBC BLD AUTO-RTO: 0 /100
PH UR STRIP: 6 PH (ref 5–7)
PH UR STRIP: 6.5 PH (ref 5–7)
PLATELET # BLD AUTO: 102 10E9/L (ref 150–450)
PLATELET # BLD AUTO: 114 10E9/L (ref 150–450)
PLATELET # BLD AUTO: 115 10E9/L (ref 150–450)
PLATELET # BLD AUTO: 125 10E9/L (ref 150–450)
PLATELET # BLD AUTO: 134 10E9/L (ref 150–450)
PLATELET # BLD AUTO: 194 10E9/L (ref 150–450)
PLATELET # BLD AUTO: 50 10E9/L (ref 150–450)
PLATELET # BLD AUTO: 55 10E9/L (ref 150–450)
PLATELET # BLD AUTO: 65 10E9/L (ref 150–450)
PLATELET # BLD AUTO: 68 10E9/L (ref 150–450)
PLATELET # BLD AUTO: 75 10E9/L (ref 150–450)
PLATELET # BLD AUTO: 80 10E9/L (ref 150–450)
PLATELET # BLD AUTO: 83 10E9/L (ref 150–450)
PLATELET # BLD AUTO: 91 10E9/L (ref 150–450)
PLATELET # BLD AUTO: 99 10E9/L (ref 150–450)
PLATELET # BLD EST: ABNORMAL 10*3/UL
POTASSIUM SERPL-SCNC: 2.3 MMOL/L (ref 3.4–5.3)
POTASSIUM SERPL-SCNC: 2.7 MMOL/L (ref 3.4–5.3)
POTASSIUM SERPL-SCNC: 3.1 MMOL/L (ref 3.4–5.3)
POTASSIUM SERPL-SCNC: 3.2 MMOL/L (ref 3.4–5.3)
POTASSIUM SERPL-SCNC: 3.3 MMOL/L (ref 3.4–5.3)
POTASSIUM SERPL-SCNC: 3.3 MMOL/L (ref 3.4–5.3)
POTASSIUM SERPL-SCNC: 3.5 MMOL/L (ref 3.4–5.3)
POTASSIUM SERPL-SCNC: 3.6 MMOL/L (ref 3.4–5.3)
POTASSIUM SERPL-SCNC: 3.7 MMOL/L (ref 3.4–5.3)
POTASSIUM SERPL-SCNC: 3.7 MMOL/L (ref 3.4–5.3)
POTASSIUM SERPL-SCNC: 3.9 MMOL/L (ref 3.4–5.3)
PROCALCITONIN SERPL-MCNC: 7.52 NG/ML
PROT SERPL-MCNC: 7 G/DL (ref 6.8–8.8)
PROT SERPL-MCNC: 7.8 G/DL (ref 6.8–8.8)
PROT SERPL-MCNC: 7.9 G/DL (ref 6.8–8.8)
PROT SERPL-MCNC: 7.9 G/DL (ref 6.8–8.8)
PROT SERPL-MCNC: 8 G/DL (ref 6.8–8.8)
PROT SERPL-MCNC: 8 G/DL (ref 6.8–8.8)
PROT SERPL-MCNC: 8.2 G/DL (ref 6.8–8.8)
PROT SERPL-MCNC: 8.5 G/DL (ref 6.8–8.8)
PROT SERPL-MCNC: 8.5 G/DL (ref 6.8–8.8)
PROT SERPL-MCNC: 8.6 G/DL (ref 6.8–8.8)
PROT SERPL-MCNC: 8.6 G/DL (ref 6.8–8.8)
PROT SERPL-MCNC: 9.2 G/DL (ref 6.8–8.8)
PROT SERPL-MCNC: 9.6 G/DL (ref 6.8–8.8)
PROT SERPL-MCNC: 9.8 G/DL (ref 6.8–8.8)
RADIOLOGIST FLAGS: ABNORMAL
RBC # BLD AUTO: 3.08 10E12/L (ref 3.8–5.2)
RBC # BLD AUTO: 3.51 10E12/L (ref 3.8–5.2)
RBC # BLD AUTO: 3.55 10E12/L (ref 3.8–5.2)
RBC # BLD AUTO: 3.57 10E12/L (ref 3.8–5.2)
RBC # BLD AUTO: 3.64 10E12/L (ref 3.8–5.2)
RBC # BLD AUTO: 3.84 10E12/L (ref 3.8–5.2)
RBC # BLD AUTO: 4.11 10E12/L (ref 3.8–5.2)
RBC # BLD AUTO: 4.13 10E12/L (ref 3.8–5.2)
RBC # BLD AUTO: 4.29 10E12/L (ref 3.8–5.2)
RBC # BLD AUTO: 4.35 10E12/L (ref 3.8–5.2)
RBC # BLD AUTO: 4.6 10E12/L (ref 3.8–5.2)
RBC # BLD AUTO: 4.66 10E12/L (ref 3.8–5.2)
RBC # BLD AUTO: 5.1 10E12/L (ref 3.8–5.2)
RBC # BLD AUTO: 5.18 10E12/L (ref 3.8–5.2)
RBC # BLD AUTO: 5.22 10E12/L (ref 3.8–5.2)
RBC #/AREA URNS AUTO: 30 /HPF (ref 0–2)
RBC #/AREA URNS AUTO: ABNORMAL /HPF
RESULT: NORMAL
SARS-COV-2 PCR COMMENT: NORMAL
SARS-COV-2 RNA SPEC QL NAA+PROBE: NEGATIVE
SARS-COV-2 RNA SPEC QL NAA+PROBE: NORMAL
SARS-COV-2 RNA SPEC QL NAA+PROBE: NORMAL
SARS-COV-2 RNA SPEC QL NAA+PROBE: NOT DETECTED
SARS-COV-2 RNA SPEC QL NAA+PROBE: NOT DETECTED
SARS-COV-2 VIRUS SPECIMEN SOURCE: NORMAL
SEND OUTS MISC TEST CODE: NORMAL
SEND OUTS MISC TEST SPECIMEN: NORMAL
SODIUM SERPL-SCNC: 132 MMOL/L (ref 133–144)
SODIUM SERPL-SCNC: 134 MMOL/L (ref 133–144)
SODIUM SERPL-SCNC: 136 MMOL/L (ref 133–144)
SODIUM SERPL-SCNC: 136 MMOL/L (ref 133–144)
SODIUM SERPL-SCNC: 137 MMOL/L (ref 133–144)
SODIUM SERPL-SCNC: 137 MMOL/L (ref 133–144)
SODIUM SERPL-SCNC: 138 MMOL/L (ref 133–144)
SODIUM SERPL-SCNC: 139 MMOL/L (ref 133–144)
SODIUM SERPL-SCNC: 139 MMOL/L (ref 133–144)
SODIUM SERPL-SCNC: 140 MMOL/L (ref 133–144)
SODIUM SERPL-SCNC: 140 MMOL/L (ref 133–144)
SODIUM SERPL-SCNC: 142 MMOL/L (ref 133–144)
SOURCE: ABNORMAL
SOURCE: ABNORMAL
SP GR UR STRIP: 1.02 (ref 1–1.03)
SP GR UR STRIP: 1.02 (ref 1–1.03)
SPECIMEN EXP DATE BLD: NORMAL
SPECIMEN SOURCE: ABNORMAL
SPECIMEN SOURCE: NORMAL
SQUAMOUS #/AREA URNS AUTO: 2 /HPF (ref 0–1)
TEST NAME: NORMAL
TRANS CELLS #/AREA URNS HPF: <1 /HPF (ref 0–1)
TSH SERPL DL<=0.005 MIU/L-ACNC: 3.2 MU/L (ref 0.4–4)
UPPER GI ENDOSCOPY: NORMAL
UROBILINOGEN UR STRIP-ACNC: 0.2 EU/DL (ref 0.2–1)
UROBILINOGEN UR STRIP-MCNC: NORMAL MG/DL (ref 0–2)
WBC # BLD AUTO: 3.4 10E9/L (ref 4–11)
WBC # BLD AUTO: 3.5 10E9/L (ref 4–11)
WBC # BLD AUTO: 3.8 10E9/L (ref 4–11)
WBC # BLD AUTO: 4 10E9/L (ref 4–11)
WBC # BLD AUTO: 4.1 10E9/L (ref 4–11)
WBC # BLD AUTO: 4.1 10E9/L (ref 4–11)
WBC # BLD AUTO: 4.2 10E9/L (ref 4–11)
WBC # BLD AUTO: 4.6 10E9/L (ref 4–11)
WBC # BLD AUTO: 4.8 10E9/L (ref 4–11)
WBC # BLD AUTO: 5.2 10E9/L (ref 4–11)
WBC # BLD AUTO: 5.4 10E9/L (ref 4–11)
WBC # BLD AUTO: 5.9 10E9/L (ref 4–11)
WBC # BLD AUTO: 6.1 10E9/L (ref 4–11)
WBC # BLD AUTO: 6.2 10E9/L (ref 4–11)
WBC # BLD AUTO: 6.7 10E9/L (ref 4–11)
WBC #/AREA URNS AUTO: 2 /HPF (ref 0–5)
WBC #/AREA URNS AUTO: ABNORMAL /HPF

## 2020-01-01 PROCEDURE — 83735 ASSAY OF MAGNESIUM: CPT | Performed by: INTERNAL MEDICINE

## 2020-01-01 PROCEDURE — 85027 COMPLETE CBC AUTOMATED: CPT | Performed by: HOSPITALIST

## 2020-01-01 PROCEDURE — 250N000011 HC RX IP 250 OP 636: Performed by: CLINICAL NURSE SPECIALIST

## 2020-01-01 PROCEDURE — 86850 RBC ANTIBODY SCREEN: CPT | Performed by: EMERGENCY MEDICINE

## 2020-01-01 PROCEDURE — 80053 COMPREHEN METABOLIC PANEL: CPT | Performed by: EMERGENCY MEDICINE

## 2020-01-01 PROCEDURE — 88305 TISSUE EXAM BY PATHOLOGIST: CPT | Performed by: INTERNAL MEDICINE

## 2020-01-01 PROCEDURE — 88342 IMHCHEM/IMCYTCHM 1ST ANTB: CPT | Performed by: INTERNAL MEDICINE

## 2020-01-01 PROCEDURE — 85025 COMPLETE CBC W/AUTO DIFF WBC: CPT | Performed by: EMERGENCY MEDICINE

## 2020-01-01 PROCEDURE — 96413 CHEMO IV INFUSION 1 HR: CPT

## 2020-01-01 PROCEDURE — 85384 FIBRINOGEN ACTIVITY: CPT | Performed by: HOSPITALIST

## 2020-01-01 PROCEDURE — 99207 ZZC NO CHARGE NURSE ONLY: CPT

## 2020-01-01 PROCEDURE — 250N000011 HC RX IP 250 OP 636: Performed by: HOSPITALIST

## 2020-01-01 PROCEDURE — 40001009 ZZH VIDEO/TELEPHONE VISIT; NO CHARGE

## 2020-01-01 PROCEDURE — 96417 CHEMO IV INFUS EACH ADDL SEQ: CPT

## 2020-01-01 PROCEDURE — 87389 HIV-1 AG W/HIV-1&-2 AB AG IA: CPT | Performed by: INTERNAL MEDICINE

## 2020-01-01 PROCEDURE — 85025 COMPLETE CBC W/AUTO DIFF WBC: CPT | Performed by: INTERNAL MEDICINE

## 2020-01-01 PROCEDURE — 99207 ZZC NO CHARGE LOS: CPT

## 2020-01-01 PROCEDURE — 80053 COMPREHEN METABOLIC PANEL: CPT | Performed by: INTERNAL MEDICINE

## 2020-01-01 PROCEDURE — 96360 HYDRATION IV INFUSION INIT: CPT | Performed by: NURSE PRACTITIONER

## 2020-01-01 PROCEDURE — 82140 ASSAY OF AMMONIA: CPT | Performed by: EMERGENCY MEDICINE

## 2020-01-01 PROCEDURE — 83036 HEMOGLOBIN GLYCOSYLATED A1C: CPT | Performed by: NURSE PRACTITIONER

## 2020-01-01 PROCEDURE — 250N000011 HC RX IP 250 OP 636: Performed by: EMERGENCY MEDICINE

## 2020-01-01 PROCEDURE — 96368 THER/DIAG CONCURRENT INF: CPT | Performed by: EMERGENCY MEDICINE

## 2020-01-01 PROCEDURE — 96368 THER/DIAG CONCURRENT INF: CPT | Performed by: NURSE PRACTITIONER

## 2020-01-01 PROCEDURE — G0463 HOSPITAL OUTPT CLINIC VISIT: HCPCS | Mod: ZF

## 2020-01-01 PROCEDURE — 96361 HYDRATE IV INFUSION ADD-ON: CPT | Performed by: EMERGENCY MEDICINE

## 2020-01-01 PROCEDURE — 27210732 ZZH ACCESSORY CR1

## 2020-01-01 PROCEDURE — 87517 HEPATITIS B DNA QUANT: CPT | Performed by: INTERNAL MEDICINE

## 2020-01-01 PROCEDURE — 36415 COLL VENOUS BLD VENIPUNCTURE: CPT | Performed by: PHYSICIAN ASSISTANT

## 2020-01-01 PROCEDURE — 99207 PR NO CHARGE LOS: CPT

## 2020-01-01 PROCEDURE — 96365 THER/PROPH/DIAG IV INF INIT: CPT | Performed by: EMERGENCY MEDICINE

## 2020-01-01 PROCEDURE — 85610 PROTHROMBIN TIME: CPT | Performed by: EMERGENCY MEDICINE

## 2020-01-01 PROCEDURE — 99207 PR CDG-MDM COMPONENT: MEETS MODERATE - UP CODED: CPT | Performed by: STUDENT IN AN ORGANIZED HEALTH CARE EDUCATION/TRAINING PROGRAM

## 2020-01-01 PROCEDURE — 250N000011 HC RX IP 250 OP 636

## 2020-01-01 PROCEDURE — 93000 ELECTROCARDIOGRAM COMPLETE: CPT | Performed by: PHYSICIAN ASSISTANT

## 2020-01-01 PROCEDURE — 96366 THER/PROPH/DIAG IV INF ADDON: CPT | Performed by: EMERGENCY MEDICINE

## 2020-01-01 PROCEDURE — 96367 TX/PROPH/DG ADDL SEQ IV INF: CPT | Performed by: INTERNAL MEDICINE

## 2020-01-01 PROCEDURE — 25000128 H RX IP 250 OP 636: Performed by: INTERNAL MEDICINE

## 2020-01-01 PROCEDURE — 74177 CT ABD & PELVIS W/CONTRAST: CPT | Performed by: RADIOLOGY

## 2020-01-01 PROCEDURE — 85610 PROTHROMBIN TIME: CPT | Performed by: HOSPITALIST

## 2020-01-01 PROCEDURE — 83690 ASSAY OF LIPASE: CPT | Performed by: PHYSICIAN ASSISTANT

## 2020-01-01 PROCEDURE — 99233 SBSQ HOSP IP/OBS HIGH 50: CPT | Performed by: CLINICAL NURSE SPECIALIST

## 2020-01-01 PROCEDURE — 96367 TX/PROPH/DG ADDL SEQ IV INF: CPT | Performed by: NURSE PRACTITIONER

## 2020-01-01 PROCEDURE — 99207 PR NO CHARGE NURSE ONLY: CPT

## 2020-01-01 PROCEDURE — 27210909 ZZH NEEDLE CR5

## 2020-01-01 PROCEDURE — 99285 EMERGENCY DEPT VISIT HI MDM: CPT | Mod: 25 | Performed by: EMERGENCY MEDICINE

## 2020-01-01 PROCEDURE — 250N000013 HC RX MED GY IP 250 OP 250 PS 637: Performed by: HOSPITALIST

## 2020-01-01 PROCEDURE — 250N000013 HC RX MED GY IP 250 OP 250 PS 637: Performed by: CLINICAL NURSE SPECIALIST

## 2020-01-01 PROCEDURE — 36591 DRAW BLOOD OFF VENOUS DEVICE: CPT

## 2020-01-01 PROCEDURE — 99207 PR NO CHARGE LOS: CPT | Performed by: INTERNAL MEDICINE

## 2020-01-01 PROCEDURE — 81001 URINALYSIS AUTO W/SCOPE: CPT | Performed by: HOSPITALIST

## 2020-01-01 PROCEDURE — 83605 ASSAY OF LACTIC ACID: CPT | Performed by: HOSPITALIST

## 2020-01-01 PROCEDURE — 120N000003 HC R&B IMCU UMMC

## 2020-01-01 PROCEDURE — 96413 CHEMO IV INFUSION 1 HR: CPT | Performed by: INTERNAL MEDICINE

## 2020-01-01 PROCEDURE — 83605 ASSAY OF LACTIC ACID: CPT | Performed by: EMERGENCY MEDICINE

## 2020-01-01 PROCEDURE — 74177 CT ABD & PELVIS W/CONTRAST: CPT

## 2020-01-01 PROCEDURE — 81025 URINE PREGNANCY TEST: CPT | Performed by: NURSE PRACTITIONER

## 2020-01-01 PROCEDURE — 87800 DETECT AGNT MULT DNA DIREC: CPT | Performed by: EMERGENCY MEDICINE

## 2020-01-01 PROCEDURE — 99232 SBSQ HOSP IP/OBS MODERATE 35: CPT | Performed by: STUDENT IN AN ORGANIZED HEALTH CARE EDUCATION/TRAINING PROGRAM

## 2020-01-01 PROCEDURE — 250N000011 HC RX IP 250 OP 636: Performed by: STUDENT IN AN ORGANIZED HEALTH CARE EDUCATION/TRAINING PROGRAM

## 2020-01-01 PROCEDURE — 25000128 H RX IP 250 OP 636: Performed by: RADIOLOGY

## 2020-01-01 PROCEDURE — 80053 COMPREHEN METABOLIC PANEL: CPT | Performed by: HOSPITALIST

## 2020-01-01 PROCEDURE — 99214 OFFICE O/P EST MOD 30 MIN: CPT | Mod: 25 | Performed by: INTERNAL MEDICINE

## 2020-01-01 PROCEDURE — 85384 FIBRINOGEN ACTIVITY: CPT | Performed by: EMERGENCY MEDICINE

## 2020-01-01 PROCEDURE — 99214 OFFICE O/P EST MOD 30 MIN: CPT | Performed by: INTERNAL MEDICINE

## 2020-01-01 PROCEDURE — 83690 ASSAY OF LIPASE: CPT | Performed by: EMERGENCY MEDICINE

## 2020-01-01 PROCEDURE — 96417 CHEMO IV INFUS EACH ADDL SEQ: CPT | Performed by: NURSE PRACTITIONER

## 2020-01-01 PROCEDURE — 258N000003 HC RX IP 258 OP 636: Performed by: EMERGENCY MEDICINE

## 2020-01-01 PROCEDURE — 85025 COMPLETE CBC W/AUTO DIFF WBC: CPT | Performed by: NURSE PRACTITIONER

## 2020-01-01 PROCEDURE — 86692 HEPATITIS DELTA AGENT ANTBDY: CPT | Mod: 90 | Performed by: INTERNAL MEDICINE

## 2020-01-01 PROCEDURE — 99223 1ST HOSP IP/OBS HIGH 75: CPT | Performed by: CLINICAL NURSE SPECIALIST

## 2020-01-01 PROCEDURE — 96375 TX/PRO/DX INJ NEW DRUG ADDON: CPT | Performed by: INTERNAL MEDICINE

## 2020-01-01 PROCEDURE — 96417 CHEMO IV INFUS EACH ADDL SEQ: CPT | Performed by: INTERNAL MEDICINE

## 2020-01-01 PROCEDURE — 99291 CRITICAL CARE FIRST HOUR: CPT | Mod: 25 | Performed by: EMERGENCY MEDICINE

## 2020-01-01 PROCEDURE — 99000 SPECIMEN HANDLING OFFICE-LAB: CPT | Performed by: INTERNAL MEDICINE

## 2020-01-01 PROCEDURE — 88341 IMHCHEM/IMCYTCHM EA ADD ANTB: CPT | Performed by: INTERNAL MEDICINE

## 2020-01-01 PROCEDURE — 96413 CHEMO IV INFUSION 1 HR: CPT | Performed by: NURSE PRACTITIONER

## 2020-01-01 PROCEDURE — U0003 INFECTIOUS AGENT DETECTION BY NUCLEIC ACID (DNA OR RNA); SEVERE ACUTE RESPIRATORY SYNDROME CORONAVIRUS 2 (SARS-COV-2) (CORONAVIRUS DISEASE [COVID-19]), AMPLIFIED PROBE TECHNIQUE, MAKING USE OF HIGH THROUGHPUT TECHNOLOGIES AS DESCRIBED BY CMS-2020-01-R: HCPCS | Performed by: EMERGENCY MEDICINE

## 2020-01-01 PROCEDURE — 85610 PROTHROMBIN TIME: CPT | Performed by: RADIOLOGY

## 2020-01-01 PROCEDURE — 80050 GENERAL HEALTH PANEL: CPT | Performed by: NURSE PRACTITIONER

## 2020-01-01 PROCEDURE — 86706 HEP B SURFACE ANTIBODY: CPT | Performed by: INTERNAL MEDICINE

## 2020-01-01 PROCEDURE — 40000167 ZZH STATISTIC PP CARE STAGE 2

## 2020-01-01 PROCEDURE — 93005 ELECTROCARDIOGRAM TRACING: CPT | Performed by: EMERGENCY MEDICINE

## 2020-01-01 PROCEDURE — 258N000003 HC RX IP 258 OP 636: Performed by: STUDENT IN AN ORGANIZED HEALTH CARE EDUCATION/TRAINING PROGRAM

## 2020-01-01 PROCEDURE — 47000 NEEDLE BIOPSY OF LIVER PERQ: CPT

## 2020-01-01 PROCEDURE — 36415 COLL VENOUS BLD VENIPUNCTURE: CPT | Performed by: HOSPITALIST

## 2020-01-01 PROCEDURE — 25000125 ZZHC RX 250: Performed by: STUDENT IN AN ORGANIZED HEALTH CARE EDUCATION/TRAINING PROGRAM

## 2020-01-01 PROCEDURE — 99204 OFFICE O/P NEW MOD 45 MIN: CPT | Performed by: NURSE PRACTITIONER

## 2020-01-01 PROCEDURE — 96375 TX/PRO/DX INJ NEW DRUG ADDON: CPT | Performed by: NURSE PRACTITIONER

## 2020-01-01 PROCEDURE — 85025 COMPLETE CBC W/AUTO DIFF WBC: CPT | Performed by: RADIOLOGY

## 2020-01-01 PROCEDURE — 99215 OFFICE O/P EST HI 40 MIN: CPT | Performed by: INTERNAL MEDICINE

## 2020-01-01 PROCEDURE — 86707 HEPATITIS BE ANTIBODY: CPT | Mod: 90 | Performed by: INTERNAL MEDICINE

## 2020-01-01 PROCEDURE — 84132 ASSAY OF SERUM POTASSIUM: CPT | Performed by: INTERNAL MEDICINE

## 2020-01-01 PROCEDURE — 87186 SC STD MICRODIL/AGAR DIL: CPT | Performed by: EMERGENCY MEDICINE

## 2020-01-01 PROCEDURE — 80053 COMPREHEN METABOLIC PANEL: CPT | Performed by: NURSE PRACTITIONER

## 2020-01-01 PROCEDURE — 88307 TISSUE EXAM BY PATHOLOGIST: CPT | Performed by: INTERNAL MEDICINE

## 2020-01-01 PROCEDURE — 87350 HEPATITIS BE AG IA: CPT | Mod: 90 | Performed by: INTERNAL MEDICINE

## 2020-01-01 PROCEDURE — 87040 BLOOD CULTURE FOR BACTERIA: CPT | Performed by: EMERGENCY MEDICINE

## 2020-01-01 PROCEDURE — 93010 ELECTROCARDIOGRAM REPORT: CPT | Performed by: EMERGENCY MEDICINE

## 2020-01-01 PROCEDURE — 25800030 ZZH RX IP 258 OP 636: Performed by: PHYSICIAN ASSISTANT

## 2020-01-01 PROCEDURE — 25000125 ZZHC RX 250: Performed by: RADIOLOGY

## 2020-01-01 PROCEDURE — 36415 COLL VENOUS BLD VENIPUNCTURE: CPT | Performed by: INTERNAL MEDICINE

## 2020-01-01 PROCEDURE — U0003 INFECTIOUS AGENT DETECTION BY NUCLEIC ACID (DNA OR RNA); SEVERE ACUTE RESPIRATORY SYNDROME CORONAVIRUS 2 (SARS-COV-2) (CORONAVIRUS DISEASE [COVID-19]), AMPLIFIED PROBE TECHNIQUE, MAKING USE OF HIGH THROUGHPUT TECHNOLOGIES AS DESCRIBED BY CMS-2020-01-R: HCPCS | Performed by: INTERNAL MEDICINE

## 2020-01-01 PROCEDURE — 93308 TTE F-UP OR LMTD: CPT | Mod: 26 | Performed by: EMERGENCY MEDICINE

## 2020-01-01 PROCEDURE — 00000159 ZZHCL STATISTIC H-SEND OUTS PREP: Performed by: INTERNAL MEDICINE

## 2020-01-01 PROCEDURE — 76700 US EXAM ABDOM COMPLETE: CPT

## 2020-01-01 PROCEDURE — 258N000003 HC RX IP 258 OP 636: Performed by: HOSPITALIST

## 2020-01-01 PROCEDURE — 85610 PROTHROMBIN TIME: CPT | Performed by: INTERNAL MEDICINE

## 2020-01-01 PROCEDURE — U0003 INFECTIOUS AGENT DETECTION BY NUCLEIC ACID (DNA OR RNA); SEVERE ACUTE RESPIRATORY SYNDROME CORONAVIRUS 2 (SARS-COV-2) (CORONAVIRUS DISEASE [COVID-19]), AMPLIFIED PROBE TECHNIQUE, MAKING USE OF HIGH THROUGHPUT TECHNOLOGIES AS DESCRIBED BY CMS-2020-01-R: HCPCS | Performed by: CLINICAL NURSE SPECIALIST

## 2020-01-01 PROCEDURE — 36415 COLL VENOUS BLD VENIPUNCTURE: CPT | Performed by: NURSE PRACTITIONER

## 2020-01-01 PROCEDURE — 88365 INSITU HYBRIDIZATION (FISH): CPT | Performed by: INTERNAL MEDICINE

## 2020-01-01 PROCEDURE — 81001 URINALYSIS AUTO W/SCOPE: CPT | Performed by: NURSE PRACTITIONER

## 2020-01-01 PROCEDURE — 71045 X-RAY EXAM CHEST 1 VIEW: CPT

## 2020-01-01 PROCEDURE — 99204 OFFICE O/P NEW MOD 45 MIN: CPT | Mod: 95 | Performed by: INTERNAL MEDICINE

## 2020-01-01 PROCEDURE — 96365 THER/PROPH/DIAG IV INF INIT: CPT

## 2020-01-01 PROCEDURE — 71260 CT THORAX DX C+: CPT | Performed by: RADIOLOGY

## 2020-01-01 PROCEDURE — 85730 THROMBOPLASTIN TIME PARTIAL: CPT | Performed by: INTERNAL MEDICINE

## 2020-01-01 PROCEDURE — 96367 TX/PROPH/DG ADDL SEQ IV INF: CPT

## 2020-01-01 PROCEDURE — 99223 1ST HOSP IP/OBS HIGH 75: CPT | Mod: AI | Performed by: HOSPITALIST

## 2020-01-01 PROCEDURE — 87341 HEP B SURFACE AG NEUTRLZJ IA: CPT | Performed by: INTERNAL MEDICINE

## 2020-01-01 PROCEDURE — 80053 COMPREHEN METABOLIC PANEL: CPT | Performed by: PHYSICIAN ASSISTANT

## 2020-01-01 PROCEDURE — 36592 COLLECT BLOOD FROM PICC: CPT | Performed by: HOSPITALIST

## 2020-01-01 PROCEDURE — 99443 ZZC PHYSICIAN TELEPHONE EVALUATION 21-30 MIN: CPT | Performed by: INTERNAL MEDICINE

## 2020-01-01 PROCEDURE — 83735 ASSAY OF MAGNESIUM: CPT | Performed by: HOSPITALIST

## 2020-01-01 PROCEDURE — 87086 URINE CULTURE/COLONY COUNT: CPT | Performed by: NURSE PRACTITIONER

## 2020-01-01 PROCEDURE — 87077 CULTURE AEROBIC IDENTIFY: CPT | Performed by: EMERGENCY MEDICINE

## 2020-01-01 PROCEDURE — 71045 X-RAY EXAM CHEST 1 VIEW: CPT | Mod: 26 | Performed by: RADIOLOGY

## 2020-01-01 PROCEDURE — 87340 HEPATITIS B SURFACE AG IA: CPT | Performed by: INTERNAL MEDICINE

## 2020-01-01 PROCEDURE — 999N001017 HC STATISTIC GLUCOSE BY METER IP

## 2020-01-01 PROCEDURE — 84702 CHORIONIC GONADOTROPIN TEST: CPT | Mod: GZ | Performed by: RADIOLOGY

## 2020-01-01 PROCEDURE — 85025 COMPLETE CBC W/AUTO DIFF WBC: CPT | Performed by: PHYSICIAN ASSISTANT

## 2020-01-01 PROCEDURE — 82272 OCCULT BLD FECES 1-3 TESTS: CPT | Performed by: PHYSICIAN ASSISTANT

## 2020-01-01 PROCEDURE — 82248 BILIRUBIN DIRECT: CPT | Performed by: EMERGENCY MEDICINE

## 2020-01-01 PROCEDURE — 99152 MOD SED SAME PHYS/QHP 5/>YRS: CPT

## 2020-01-01 PROCEDURE — 86900 BLOOD TYPING SEROLOGIC ABO: CPT | Performed by: EMERGENCY MEDICINE

## 2020-01-01 PROCEDURE — 88333 PATH CONSLTJ SURG CYTO XM 1: CPT | Performed by: INTERNAL MEDICINE

## 2020-01-01 PROCEDURE — 99442 PR PHYSICIAN TELEPHONE EVALUATION 11-20 MIN: CPT | Mod: 95 | Performed by: STUDENT IN AN ORGANIZED HEALTH CARE EDUCATION/TRAINING PROGRAM

## 2020-01-01 PROCEDURE — 36415 COLL VENOUS BLD VENIPUNCTURE: CPT | Performed by: STUDENT IN AN ORGANIZED HEALTH CARE EDUCATION/TRAINING PROGRAM

## 2020-01-01 PROCEDURE — 25800030 ZZH RX IP 258 OP 636: Performed by: INTERNAL MEDICINE

## 2020-01-01 PROCEDURE — 99153 MOD SED SAME PHYS/QHP EA: CPT

## 2020-01-01 PROCEDURE — 83735 ASSAY OF MAGNESIUM: CPT | Performed by: NURSE PRACTITIONER

## 2020-01-01 PROCEDURE — 87040 BLOOD CULTURE FOR BACTERIA: CPT | Performed by: STUDENT IN AN ORGANIZED HEALTH CARE EDUCATION/TRAINING PROGRAM

## 2020-01-01 PROCEDURE — 84132 ASSAY OF SERUM POTASSIUM: CPT | Performed by: HOSPITALIST

## 2020-01-01 PROCEDURE — 25000128 H RX IP 250 OP 636: Performed by: STUDENT IN AN ORGANIZED HEALTH CARE EDUCATION/TRAINING PROGRAM

## 2020-01-01 PROCEDURE — 84145 PROCALCITONIN (PCT): CPT | Performed by: HOSPITALIST

## 2020-01-01 PROCEDURE — 93308 TTE F-UP OR LMTD: CPT | Performed by: EMERGENCY MEDICINE

## 2020-01-01 PROCEDURE — 86803 HEPATITIS C AB TEST: CPT | Performed by: INTERNAL MEDICINE

## 2020-01-01 PROCEDURE — 99238 HOSP IP/OBS DSCHRG MGMT 30/<: CPT | Performed by: STUDENT IN AN ORGANIZED HEALTH CARE EDUCATION/TRAINING PROGRAM

## 2020-01-01 PROCEDURE — 74019 RADEX ABDOMEN 2 VIEWS: CPT

## 2020-01-01 PROCEDURE — 86901 BLOOD TYPING SEROLOGIC RH(D): CPT | Performed by: EMERGENCY MEDICINE

## 2020-01-01 PROCEDURE — C9803 HOPD COVID-19 SPEC COLLECT: HCPCS | Performed by: EMERGENCY MEDICINE

## 2020-01-01 PROCEDURE — 99214 OFFICE O/P EST MOD 30 MIN: CPT | Performed by: PHYSICIAN ASSISTANT

## 2020-01-01 RX ORDER — SULFAMETHOXAZOLE/TRIMETHOPRIM 800-160 MG
1 TABLET ORAL 2 TIMES DAILY
Qty: 10 TABLET | Refills: 0 | Status: SHIPPED | OUTPATIENT
Start: 2020-01-01 | End: 2020-01-01

## 2020-01-01 RX ORDER — METHYLPREDNISOLONE SODIUM SUCCINATE 125 MG/2ML
125 INJECTION, POWDER, LYOPHILIZED, FOR SOLUTION INTRAMUSCULAR; INTRAVENOUS
Status: CANCELLED
Start: 2020-01-01

## 2020-01-01 RX ORDER — PROCHLORPERAZINE 25 MG
25 SUPPOSITORY, RECTAL RECTAL EVERY 12 HOURS PRN
Status: DISCONTINUED | OUTPATIENT
Start: 2020-01-01 | End: 2020-01-01 | Stop reason: HOSPADM

## 2020-01-01 RX ORDER — OXYCODONE HYDROCHLORIDE 10 MG/1
10 TABLET ORAL EVERY 6 HOURS PRN
Qty: 40 TABLET | Refills: 0 | Status: SHIPPED | OUTPATIENT
Start: 2020-01-01 | End: 2020-01-01

## 2020-01-01 RX ORDER — HEPARIN SODIUM (PORCINE) LOCK FLUSH IV SOLN 100 UNIT/ML 100 UNIT/ML
5 SOLUTION INTRAVENOUS
Status: DISCONTINUED | OUTPATIENT
Start: 2020-01-01 | End: 2020-01-01 | Stop reason: HOSPADM

## 2020-01-01 RX ORDER — DIPHENHYDRAMINE HYDROCHLORIDE 50 MG/ML
50 INJECTION INTRAMUSCULAR; INTRAVENOUS
Status: CANCELLED
Start: 2020-01-01

## 2020-01-01 RX ORDER — LORAZEPAM 2 MG/ML
0.5 INJECTION INTRAMUSCULAR EVERY 4 HOURS PRN
Status: CANCELLED
Start: 2020-01-01

## 2020-01-01 RX ORDER — LORAZEPAM 1 MG/1
1 TABLET ORAL EVERY 4 HOURS PRN
Status: DISCONTINUED | OUTPATIENT
Start: 2020-01-01 | End: 2020-01-01

## 2020-01-01 RX ORDER — AMOXICILLIN 250 MG
1 CAPSULE ORAL 2 TIMES DAILY
Status: DISCONTINUED | OUTPATIENT
Start: 2020-01-01 | End: 2020-01-01

## 2020-01-01 RX ORDER — LORAZEPAM 0.5 MG/1
.5-1 TABLET ORAL
Qty: 40 TABLET | Refills: 0 | Status: SHIPPED | OUTPATIENT
Start: 2020-01-01

## 2020-01-01 RX ORDER — POTASSIUM CHLORIDE 7.45 MG/ML
10 INJECTION INTRAVENOUS
Status: DISCONTINUED | OUTPATIENT
Start: 2020-01-01 | End: 2020-01-01

## 2020-01-01 RX ORDER — NALOXONE HYDROCHLORIDE 0.4 MG/ML
.1-.4 INJECTION, SOLUTION INTRAMUSCULAR; INTRAVENOUS; SUBCUTANEOUS
Status: CANCELLED | OUTPATIENT
Start: 2020-01-01

## 2020-01-01 RX ORDER — EPINEPHRINE 1 MG/ML
0.3 INJECTION, SOLUTION INTRAMUSCULAR; SUBCUTANEOUS EVERY 5 MIN PRN
Status: CANCELLED | OUTPATIENT
Start: 2020-01-01

## 2020-01-01 RX ORDER — PROCHLORPERAZINE MALEATE 10 MG
10 TABLET ORAL EVERY 6 HOURS PRN
Qty: 20 TABLET | Refills: 0 | Status: SHIPPED | OUTPATIENT
Start: 2020-01-01

## 2020-01-01 RX ORDER — HEPARIN SODIUM (PORCINE) LOCK FLUSH IV SOLN 100 UNIT/ML 100 UNIT/ML
5 SOLUTION INTRAVENOUS
Status: CANCELLED | OUTPATIENT
Start: 2020-01-01

## 2020-01-01 RX ORDER — DEXTROSE MONOHYDRATE 25 G/50ML
25-50 INJECTION, SOLUTION INTRAVENOUS
Status: DISCONTINUED | OUTPATIENT
Start: 2020-01-01 | End: 2020-01-01 | Stop reason: HOSPADM

## 2020-01-01 RX ORDER — ALBUTEROL SULFATE 90 UG/1
1-2 AEROSOL, METERED RESPIRATORY (INHALATION)
Status: CANCELLED
Start: 2020-01-01

## 2020-01-01 RX ORDER — ACETAMINOPHEN 325 MG/1
975 TABLET ORAL EVERY 8 HOURS
Status: DISCONTINUED | OUTPATIENT
Start: 2020-01-01 | End: 2020-01-01

## 2020-01-01 RX ORDER — ONDANSETRON 8 MG/1
8 TABLET, FILM COATED ORAL EVERY 8 HOURS PRN
Qty: 10 TABLET | Refills: 5 | Status: ON HOLD | OUTPATIENT
Start: 2020-01-01 | End: 2020-01-01

## 2020-01-01 RX ORDER — SIMETHICONE 125 MG
125 TABLET,CHEWABLE ORAL 2 TIMES DAILY PRN
Qty: 20 TABLET | Refills: 0 | Status: SHIPPED | OUTPATIENT
Start: 2020-01-01 | End: 2020-01-01

## 2020-01-01 RX ORDER — POTASSIUM CHLORIDE 750 MG/1
TABLET, EXTENDED RELEASE ORAL
Qty: 35 TABLET | Refills: 1 | Status: SHIPPED | OUTPATIENT
Start: 2020-01-01

## 2020-01-01 RX ORDER — PIPERACILLIN SODIUM, TAZOBACTAM SODIUM 3; .375 G/15ML; G/15ML
3.38 INJECTION, POWDER, LYOPHILIZED, FOR SOLUTION INTRAVENOUS EVERY 6 HOURS
Status: DISCONTINUED | OUTPATIENT
Start: 2020-01-01 | End: 2020-01-01

## 2020-01-01 RX ORDER — ONDANSETRON 4 MG/1
4 TABLET, ORALLY DISINTEGRATING ORAL EVERY 6 HOURS PRN
Qty: 20 TABLET | Refills: 0 | Status: SHIPPED | OUTPATIENT
Start: 2020-01-01

## 2020-01-01 RX ORDER — ONDANSETRON 4 MG/1
4 TABLET, ORALLY DISINTEGRATING ORAL EVERY 6 HOURS PRN
Status: DISCONTINUED | OUTPATIENT
Start: 2020-01-01 | End: 2020-01-01 | Stop reason: HOSPADM

## 2020-01-01 RX ORDER — ALBUTEROL SULFATE 0.83 MG/ML
2.5 SOLUTION RESPIRATORY (INHALATION)
Status: CANCELLED | OUTPATIENT
Start: 2020-01-01

## 2020-01-01 RX ORDER — CEFTRIAXONE 2 G/1
2 INJECTION, POWDER, FOR SOLUTION INTRAMUSCULAR; INTRAVENOUS EVERY 24 HOURS
Status: DISCONTINUED | OUTPATIENT
Start: 2020-01-01 | End: 2020-01-01

## 2020-01-01 RX ORDER — ONDANSETRON 4 MG/1
4 TABLET, ORALLY DISINTEGRATING ORAL EVERY 8 HOURS PRN
Qty: 20 TABLET | Refills: 0 | Status: ON HOLD | OUTPATIENT
Start: 2020-01-01 | End: 2020-01-01

## 2020-01-01 RX ORDER — HEPARIN SODIUM,PORCINE 10 UNIT/ML
5 VIAL (ML) INTRAVENOUS
Status: CANCELLED | OUTPATIENT
Start: 2020-01-01

## 2020-01-01 RX ORDER — LORAZEPAM 2 MG/ML
.5-1 INJECTION INTRAMUSCULAR
Status: DISCONTINUED | OUTPATIENT
Start: 2020-01-01 | End: 2020-01-01

## 2020-01-01 RX ORDER — HYDROCHLOROTHIAZIDE 25 MG/1
25 TABLET ORAL DAILY
Qty: 30 TABLET | Refills: 3 | Status: ON HOLD | OUTPATIENT
Start: 2020-01-01 | End: 2020-01-01

## 2020-01-01 RX ORDER — DIAZEPAM 5 MG
5 TABLET ORAL
COMMUNITY
Start: 2019-08-15 | End: 2020-01-01

## 2020-01-01 RX ORDER — PROCHLORPERAZINE MALEATE 10 MG
5 TABLET ORAL EVERY 6 HOURS PRN
Qty: 30 TABLET | Refills: 5 | Status: ON HOLD | OUTPATIENT
Start: 2020-01-01 | End: 2020-01-01

## 2020-01-01 RX ORDER — MIRTAZAPINE 15 MG/1
15 TABLET, FILM COATED ORAL
Status: ON HOLD | COMMUNITY
Start: 2019-08-15 | End: 2020-01-01

## 2020-01-01 RX ORDER — ONDANSETRON 2 MG/ML
4 INJECTION INTRAMUSCULAR; INTRAVENOUS
Status: DISCONTINUED | OUTPATIENT
Start: 2020-01-01 | End: 2020-01-01 | Stop reason: HOSPADM

## 2020-01-01 RX ORDER — PROCHLORPERAZINE MALEATE 5 MG
5 TABLET ORAL EVERY 6 HOURS PRN
Qty: 30 TABLET | Refills: 3 | Status: ON HOLD | OUTPATIENT
Start: 2020-01-01 | End: 2020-01-01

## 2020-01-01 RX ORDER — AMOXICILLIN 250 MG
2 CAPSULE ORAL 2 TIMES DAILY
Status: DISCONTINUED | OUTPATIENT
Start: 2020-01-01 | End: 2020-01-01

## 2020-01-01 RX ORDER — LORAZEPAM 0.5 MG/1
0.5 TABLET ORAL EVERY 4 HOURS PRN
Qty: 30 TABLET | Refills: 5 | Status: SHIPPED | OUTPATIENT
Start: 2020-01-01 | End: 2020-01-01

## 2020-01-01 RX ORDER — HYDROMORPHONE HYDROCHLORIDE 10 MG/ML
4-8 INJECTION INTRAMUSCULAR; INTRAVENOUS; SUBCUTANEOUS
Status: DISCONTINUED | OUTPATIENT
Start: 2020-01-01 | End: 2020-01-01

## 2020-01-01 RX ORDER — OXYCODONE HYDROCHLORIDE 5 MG/1
5 TABLET ORAL EVERY 6 HOURS PRN
Qty: 30 TABLET | Refills: 0 | Status: SHIPPED | OUTPATIENT
Start: 2020-01-01 | End: 2020-01-01

## 2020-01-01 RX ORDER — SODIUM CHLORIDE 9 MG/ML
1000 INJECTION, SOLUTION INTRAVENOUS CONTINUOUS PRN
Status: CANCELLED
Start: 2020-01-01

## 2020-01-01 RX ORDER — SODIUM CHLORIDE, SODIUM LACTATE, POTASSIUM CHLORIDE, CALCIUM CHLORIDE 600; 310; 30; 20 MG/100ML; MG/100ML; MG/100ML; MG/100ML
INJECTION, SOLUTION INTRAVENOUS CONTINUOUS PRN
Status: DISCONTINUED | OUTPATIENT
Start: 2020-01-01 | End: 2020-01-01

## 2020-01-01 RX ORDER — HEPARIN SODIUM,PORCINE 10 UNIT/ML
5-10 VIAL (ML) INTRAVENOUS
Status: DISCONTINUED | OUTPATIENT
Start: 2020-01-01 | End: 2020-01-01 | Stop reason: HOSPADM

## 2020-01-01 RX ORDER — LIDOCAINE 40 MG/G
CREAM TOPICAL
Status: DISCONTINUED | OUTPATIENT
Start: 2020-01-01 | End: 2020-01-01 | Stop reason: HOSPADM

## 2020-01-01 RX ORDER — NICOTINE POLACRILEX 4 MG
15-30 LOZENGE BUCCAL
Status: DISCONTINUED | OUTPATIENT
Start: 2020-01-01 | End: 2020-01-01 | Stop reason: HOSPADM

## 2020-01-01 RX ORDER — LORAZEPAM 0.5 MG/1
.5-1 TABLET ORAL
Status: DISCONTINUED | OUTPATIENT
Start: 2020-01-01 | End: 2020-01-01 | Stop reason: HOSPADM

## 2020-01-01 RX ORDER — HYDROMORPHONE HYDROCHLORIDE 1 MG/ML
0.5 INJECTION, SOLUTION INTRAMUSCULAR; INTRAVENOUS; SUBCUTANEOUS
Status: DISCONTINUED | OUTPATIENT
Start: 2020-01-01 | End: 2020-01-01

## 2020-01-01 RX ORDER — ATROPINE SULFATE 10 MG/ML
1-2 SOLUTION/ DROPS OPHTHALMIC
Qty: 15 ML | Refills: 0 | Status: SHIPPED | OUTPATIENT
Start: 2020-01-01

## 2020-01-01 RX ORDER — IOPAMIDOL 755 MG/ML
131 INJECTION, SOLUTION INTRAVASCULAR ONCE
Status: COMPLETED | OUTPATIENT
Start: 2020-01-01 | End: 2020-01-01

## 2020-01-01 RX ORDER — LIDOCAINE/PRILOCAINE 2.5 %-2.5%
CREAM (GRAM) TOPICAL PRN
Qty: 30 G | Refills: 3 | Status: ON HOLD | OUTPATIENT
Start: 2020-01-01 | End: 2020-01-01

## 2020-01-01 RX ORDER — LIDOCAINE HYDROCHLORIDE 20 MG/ML
INJECTION, SOLUTION INFILTRATION; PERINEURAL PRN
Status: DISCONTINUED | OUTPATIENT
Start: 2020-01-01 | End: 2020-01-01

## 2020-01-01 RX ORDER — HYDROMORPHONE HYDROCHLORIDE 2 MG/1
2-4 TABLET ORAL
Qty: 40 TABLET | Refills: 0 | Status: SHIPPED | OUTPATIENT
Start: 2020-01-01

## 2020-01-01 RX ORDER — HEPARIN SODIUM,PORCINE 10 UNIT/ML
5 VIAL (ML) INTRAVENOUS
Status: DISCONTINUED | OUTPATIENT
Start: 2020-01-01 | End: 2020-01-01 | Stop reason: HOSPADM

## 2020-01-01 RX ORDER — OMEPRAZOLE 40 MG/1
40 CAPSULE, DELAYED RELEASE ORAL DAILY
Qty: 30 CAPSULE | Refills: 0 | Status: SHIPPED | OUTPATIENT
Start: 2020-01-01 | End: 2020-01-01

## 2020-01-01 RX ORDER — HYDROMORPHONE HYDROCHLORIDE 10 MG/ML
2-4 INJECTION INTRAMUSCULAR; INTRAVENOUS; SUBCUTANEOUS
Status: DISCONTINUED | OUTPATIENT
Start: 2020-01-01 | End: 2020-01-01 | Stop reason: HOSPADM

## 2020-01-01 RX ORDER — HYDROMORPHONE HYDROCHLORIDE 1 MG/ML
.3-.5 INJECTION, SOLUTION INTRAMUSCULAR; INTRAVENOUS; SUBCUTANEOUS
Status: DISCONTINUED | OUTPATIENT
Start: 2020-01-01 | End: 2020-01-01

## 2020-01-01 RX ORDER — GLYCOPYRROLATE 0.2 MG/ML
.2-.4 INJECTION, SOLUTION INTRAMUSCULAR; INTRAVENOUS EVERY 4 HOURS PRN
Status: DISCONTINUED | OUTPATIENT
Start: 2020-01-01 | End: 2020-01-01

## 2020-01-01 RX ORDER — OXYCODONE HYDROCHLORIDE 10 MG/1
10 TABLET ORAL EVERY 6 HOURS PRN
Qty: 60 TABLET | Refills: 0 | Status: ON HOLD | OUTPATIENT
Start: 2020-01-01 | End: 2020-01-01

## 2020-01-01 RX ORDER — POTASSIUM CHLORIDE 7.45 MG/ML
10 INJECTION INTRAVENOUS ONCE
Status: COMPLETED | OUTPATIENT
Start: 2020-01-01 | End: 2020-01-01

## 2020-01-01 RX ORDER — PROPOFOL 10 MG/ML
INJECTION, EMULSION INTRAVENOUS PRN
Status: DISCONTINUED | OUTPATIENT
Start: 2020-01-01 | End: 2020-01-01

## 2020-01-01 RX ORDER — POTASSIUM CHLORIDE 750 MG/1
40 TABLET, EXTENDED RELEASE ORAL
Status: COMPLETED | OUTPATIENT
Start: 2020-01-01 | End: 2020-01-01

## 2020-01-01 RX ORDER — GLYCOPYRROLATE 0.2 MG/ML
INJECTION, SOLUTION INTRAMUSCULAR; INTRAVENOUS PRN
Status: DISCONTINUED | OUTPATIENT
Start: 2020-01-01 | End: 2020-01-01

## 2020-01-01 RX ORDER — HEPARIN SODIUM,PORCINE 10 UNIT/ML
5-10 VIAL (ML) INTRAVENOUS EVERY 24 HOURS
Status: DISCONTINUED | OUTPATIENT
Start: 2020-01-01 | End: 2020-01-01 | Stop reason: HOSPADM

## 2020-01-01 RX ORDER — FLUMAZENIL 0.1 MG/ML
0.2 INJECTION, SOLUTION INTRAVENOUS
Status: DISCONTINUED | OUTPATIENT
Start: 2020-01-01 | End: 2020-01-01 | Stop reason: HOSPADM

## 2020-01-01 RX ORDER — IOPAMIDOL 755 MG/ML
500 INJECTION, SOLUTION INTRAVASCULAR ONCE
Status: COMPLETED | OUTPATIENT
Start: 2020-01-01 | End: 2020-01-01

## 2020-01-01 RX ORDER — LORAZEPAM 0.5 MG/1
1 TABLET ORAL EVERY 4 HOURS PRN
Qty: 40 TABLET | Refills: 0 | Status: ON HOLD | OUTPATIENT
Start: 2020-01-01 | End: 2020-01-01

## 2020-01-01 RX ORDER — HEPARIN SODIUM (PORCINE) LOCK FLUSH IV SOLN 100 UNIT/ML 100 UNIT/ML
5 SOLUTION INTRAVENOUS EVERY 8 HOURS
Status: DISCONTINUED | OUTPATIENT
Start: 2020-01-01 | End: 2020-01-01 | Stop reason: HOSPADM

## 2020-01-01 RX ORDER — SODIUM CHLORIDE, SODIUM LACTATE, POTASSIUM CHLORIDE, CALCIUM CHLORIDE 600; 310; 30; 20 MG/100ML; MG/100ML; MG/100ML; MG/100ML
INJECTION, SOLUTION INTRAVENOUS CONTINUOUS
Status: DISCONTINUED | OUTPATIENT
Start: 2020-01-01 | End: 2020-01-01

## 2020-01-01 RX ORDER — POTASSIUM CHLORIDE 29.8 MG/ML
20 INJECTION INTRAVENOUS
Status: COMPLETED | OUTPATIENT
Start: 2020-01-01 | End: 2020-01-01

## 2020-01-01 RX ORDER — FENTANYL CITRATE 50 UG/ML
25-50 INJECTION, SOLUTION INTRAMUSCULAR; INTRAVENOUS EVERY 5 MIN PRN
Status: DISCONTINUED | OUTPATIENT
Start: 2020-01-01 | End: 2020-01-01 | Stop reason: HOSPADM

## 2020-01-01 RX ORDER — ATROPINE SULFATE 10 MG/ML
1-2 SOLUTION/ DROPS OPHTHALMIC
Status: DISCONTINUED | OUTPATIENT
Start: 2020-01-01 | End: 2020-01-01 | Stop reason: HOSPADM

## 2020-01-01 RX ORDER — ONDANSETRON 8 MG/1
8 TABLET, FILM COATED ORAL EVERY 8 HOURS PRN
Qty: 30 TABLET | Refills: 3 | Status: ON HOLD | OUTPATIENT
Start: 2020-01-01 | End: 2020-01-01

## 2020-01-01 RX ORDER — HYDROMORPHONE HYDROCHLORIDE 1 MG/ML
0.5 INJECTION, SOLUTION INTRAMUSCULAR; INTRAVENOUS; SUBCUTANEOUS ONCE
Status: COMPLETED | OUTPATIENT
Start: 2020-01-01 | End: 2020-01-01

## 2020-01-01 RX ORDER — HYDROMORPHONE HYDROCHLORIDE 10 MG/ML
2-4 INJECTION INTRAMUSCULAR; INTRAVENOUS; SUBCUTANEOUS
Qty: 20 ML | Refills: 0 | Status: SHIPPED | OUTPATIENT
Start: 2020-01-01 | End: 2020-01-01

## 2020-01-01 RX ORDER — PROCHLORPERAZINE 25 MG
25 SUPPOSITORY, RECTAL RECTAL EVERY 12 HOURS PRN
Qty: 10 SUPPOSITORY | Refills: 0 | Status: SHIPPED | OUTPATIENT
Start: 2020-01-01

## 2020-01-01 RX ORDER — HYDROMORPHONE HCL IN WATER/PF 6 MG/30 ML
.2-.3 PATIENT CONTROLLED ANALGESIA SYRINGE INTRAVENOUS
Status: DISCONTINUED | OUTPATIENT
Start: 2020-01-01 | End: 2020-01-01

## 2020-01-01 RX ORDER — ONDANSETRON 8 MG/1
8 TABLET, FILM COATED ORAL EVERY 8 HOURS PRN
Qty: 30 TABLET | Refills: 3 | Status: SHIPPED | OUTPATIENT
Start: 2020-01-01 | End: 2020-01-01

## 2020-01-01 RX ORDER — LORAZEPAM 0.5 MG/1
0.5 TABLET ORAL EVERY 4 HOURS PRN
Qty: 30 TABLET | Refills: 5 | Status: CANCELLED | OUTPATIENT
Start: 2020-01-01

## 2020-01-01 RX ORDER — NALOXONE HYDROCHLORIDE 0.4 MG/ML
.1-.4 INJECTION, SOLUTION INTRAMUSCULAR; INTRAVENOUS; SUBCUTANEOUS
Status: DISCONTINUED | OUTPATIENT
Start: 2020-01-01 | End: 2020-01-01 | Stop reason: HOSPADM

## 2020-01-01 RX ORDER — POLYETHYLENE GLYCOL 3350 17 G/17G
1 POWDER, FOR SOLUTION ORAL DAILY
Qty: 500 G | Refills: 0 | Status: ON HOLD | OUTPATIENT
Start: 2020-01-01 | End: 2020-01-01

## 2020-01-01 RX ORDER — DOCUSATE SODIUM 100 MG/1
100 CAPSULE, LIQUID FILLED ORAL 2 TIMES DAILY
Qty: 60 CAPSULE | Refills: 1 | Status: SHIPPED | OUTPATIENT
Start: 2020-01-01 | End: 2020-01-01

## 2020-01-01 RX ORDER — POTASSIUM CHLORIDE 750 MG/1
20 TABLET, EXTENDED RELEASE ORAL ONCE
Status: COMPLETED | OUTPATIENT
Start: 2020-01-01 | End: 2020-01-01

## 2020-01-01 RX ORDER — SODIUM CHLORIDE, SODIUM LACTATE, POTASSIUM CHLORIDE, CALCIUM CHLORIDE 600; 310; 30; 20 MG/100ML; MG/100ML; MG/100ML; MG/100ML
500 INJECTION, SOLUTION INTRAVENOUS CONTINUOUS
Status: DISCONTINUED | OUTPATIENT
Start: 2020-01-01 | End: 2020-01-01 | Stop reason: HOSPADM

## 2020-01-01 RX ORDER — SODIUM CHLORIDE 9 MG/ML
INJECTION, SOLUTION INTRAVENOUS CONTINUOUS
Status: DISCONTINUED | OUTPATIENT
Start: 2020-01-01 | End: 2020-01-01 | Stop reason: HOSPADM

## 2020-01-01 RX ORDER — POLYETHYLENE GLYCOL 3350 17 G/17G
17 POWDER, FOR SOLUTION ORAL DAILY
Status: DISCONTINUED | OUTPATIENT
Start: 2020-01-01 | End: 2020-01-01

## 2020-01-01 RX ORDER — OXYCODONE HCL 20 MG/ML
15-20 CONCENTRATE, ORAL ORAL
Status: DISCONTINUED | OUTPATIENT
Start: 2020-01-01 | End: 2020-01-01

## 2020-01-01 RX ORDER — PROCHLORPERAZINE MALEATE 10 MG
10 TABLET ORAL EVERY 6 HOURS PRN
Status: DISCONTINUED | OUTPATIENT
Start: 2020-01-01 | End: 2020-01-01 | Stop reason: HOSPADM

## 2020-01-01 RX ORDER — PROCHLORPERAZINE MALEATE 10 MG
5 TABLET ORAL EVERY 6 HOURS PRN
Qty: 30 TABLET | Refills: 5 | Status: SHIPPED | OUTPATIENT
Start: 2020-01-01 | End: 2020-01-01

## 2020-01-01 RX ORDER — ONDANSETRON 2 MG/ML
4 INJECTION INTRAMUSCULAR; INTRAVENOUS EVERY 6 HOURS PRN
Status: DISCONTINUED | OUTPATIENT
Start: 2020-01-01 | End: 2020-01-01

## 2020-01-01 RX ORDER — IOPAMIDOL 755 MG/ML
122 INJECTION, SOLUTION INTRAVASCULAR ONCE
Status: COMPLETED | OUTPATIENT
Start: 2020-01-01 | End: 2020-01-01

## 2020-01-01 RX ADMIN — HYDROMORPHONE HYDROCHLORIDE 2 MG: 10 INJECTION, SOLUTION INTRAMUSCULAR; INTRAVENOUS; SUBCUTANEOUS at 23:01

## 2020-01-01 RX ADMIN — HYDROMORPHONE HYDROCHLORIDE 0.2 MG: 1 INJECTION, SOLUTION INTRAMUSCULAR; INTRAVENOUS; SUBCUTANEOUS at 17:12

## 2020-01-01 RX ADMIN — SODIUM CHLORIDE, SODIUM LACTATE, POTASSIUM CHLORIDE, CALCIUM CHLORIDE: 600; 310; 30; 20 INJECTION, SOLUTION INTRAVENOUS at 14:13

## 2020-01-01 RX ADMIN — HEPARIN SODIUM (PORCINE) LOCK FLUSH IV SOLN 100 UNIT/ML 5 ML: 100 SOLUTION at 13:14

## 2020-01-01 RX ADMIN — HEPARIN SODIUM (PORCINE) LOCK FLUSH IV SOLN 100 UNIT/ML 5 ML: 100 SOLUTION at 12:21

## 2020-01-01 RX ADMIN — IOPAMIDOL 122 ML: 755 INJECTION, SOLUTION INTRAVASCULAR at 10:27

## 2020-01-01 RX ADMIN — POTASSIUM CHLORIDE 10 MEQ: 7.46 INJECTION, SOLUTION INTRAVENOUS at 19:10

## 2020-01-01 RX ADMIN — CISPLATIN 56 MG: 100 INJECTION, SOLUTION INTRAVENOUS at 09:57

## 2020-01-01 RX ADMIN — HYDROMORPHONE HYDROCHLORIDE 0.5 MG: 1 INJECTION, SOLUTION INTRAMUSCULAR; INTRAVENOUS; SUBCUTANEOUS at 09:26

## 2020-01-01 RX ADMIN — SODIUM CHLORIDE, POTASSIUM CHLORIDE, SODIUM LACTATE AND CALCIUM CHLORIDE: 600; 310; 30; 20 INJECTION, SOLUTION INTRAVENOUS at 16:01

## 2020-01-01 RX ADMIN — HYDROMORPHONE HYDROCHLORIDE 0.3 MG: 1 INJECTION, SOLUTION INTRAMUSCULAR; INTRAVENOUS; SUBCUTANEOUS at 18:24

## 2020-01-01 RX ADMIN — HYDROMORPHONE HYDROCHLORIDE 0.5 MG: 1 INJECTION, SOLUTION INTRAMUSCULAR; INTRAVENOUS; SUBCUTANEOUS at 14:10

## 2020-01-01 RX ADMIN — HEPARIN SODIUM (PORCINE) LOCK FLUSH IV SOLN 100 UNIT/ML 5 ML: 100 SOLUTION at 11:59

## 2020-01-01 RX ADMIN — POTASSIUM CHLORIDE 20 MEQ: 750 TABLET, EXTENDED RELEASE ORAL at 10:04

## 2020-01-01 RX ADMIN — Medication 250 ML: at 14:33

## 2020-01-01 RX ADMIN — Medication 1000 ML: at 14:09

## 2020-01-01 RX ADMIN — LIDOCAINE HYDROCHLORIDE 10 ML: 10 INJECTION, SOLUTION EPIDURAL; INFILTRATION; INTRACAUDAL; PERINEURAL at 14:23

## 2020-01-01 RX ADMIN — Medication 250 ML: at 12:43

## 2020-01-01 RX ADMIN — HYDROMORPHONE HYDROCHLORIDE 0.5 MG: 1 INJECTION, SOLUTION INTRAMUSCULAR; INTRAVENOUS; SUBCUTANEOUS at 21:16

## 2020-01-01 RX ADMIN — HYDROMORPHONE HYDROCHLORIDE 2 MG: 10 INJECTION, SOLUTION INTRAMUSCULAR; INTRAVENOUS; SUBCUTANEOUS at 01:58

## 2020-01-01 RX ADMIN — FLUOXETINE 20 MG: 20 CAPSULE ORAL at 08:33

## 2020-01-01 RX ADMIN — POTASSIUM CHLORIDE 20 MEQ: 29.8 INJECTION, SOLUTION INTRAVENOUS at 13:52

## 2020-01-01 RX ADMIN — IOPAMIDOL 100 ML: 755 INJECTION, SOLUTION INTRAVENOUS at 15:45

## 2020-01-01 RX ADMIN — HYDROMORPHONE HYDROCHLORIDE 0.5 MG: 1 INJECTION, SOLUTION INTRAMUSCULAR; INTRAVENOUS; SUBCUTANEOUS at 19:54

## 2020-01-01 RX ADMIN — SODIUM CHLORIDE 250 ML: 9 INJECTION, SOLUTION INTRAVENOUS at 09:37

## 2020-01-01 RX ADMIN — ONDANSETRON 4 MG: 2 INJECTION INTRAMUSCULAR; INTRAVENOUS at 10:13

## 2020-01-01 RX ADMIN — HEPARIN SODIUM (PORCINE) LOCK FLUSH IV SOLN 100 UNIT/ML 5 ML: 100 SOLUTION at 11:37

## 2020-01-01 RX ADMIN — SODIUM CHLORIDE, POTASSIUM CHLORIDE, SODIUM LACTATE AND CALCIUM CHLORIDE 1000 ML: 600; 310; 30; 20 INJECTION, SOLUTION INTRAVENOUS at 17:31

## 2020-01-01 RX ADMIN — LIDOCAINE HYDROCHLORIDE 100 MG: 20 INJECTION, SOLUTION INFILTRATION; PERINEURAL at 14:23

## 2020-01-01 RX ADMIN — GEMCITABINE 2200 MG: 38 INJECTION, SOLUTION INTRAVENOUS at 11:04

## 2020-01-01 RX ADMIN — HEPARIN SODIUM (PORCINE) LOCK FLUSH IV SOLN 100 UNIT/ML 5 ML: 100 SOLUTION at 15:56

## 2020-01-01 RX ADMIN — PROPOFOL 120 MG: 10 INJECTION, EMULSION INTRAVENOUS at 14:23

## 2020-01-01 RX ADMIN — SODIUM CHLORIDE, POTASSIUM CHLORIDE, SODIUM LACTATE AND CALCIUM CHLORIDE: 600; 310; 30; 20 INJECTION, SOLUTION INTRAVENOUS at 22:51

## 2020-01-01 RX ADMIN — PIPERACILLIN AND TAZOBACTAM 3.38 G: 3; .375 INJECTION, POWDER, LYOPHILIZED, FOR SOLUTION INTRAVENOUS at 19:19

## 2020-01-01 RX ADMIN — Medication 250 ML: at 10:10

## 2020-01-01 RX ADMIN — SODIUM CHLORIDE 60 ML: 9 INJECTION, SOLUTION INTRAVENOUS at 15:45

## 2020-01-01 RX ADMIN — SODIUM CHLORIDE, POTASSIUM CHLORIDE, SODIUM LACTATE AND CALCIUM CHLORIDE: 600; 310; 30; 20 INJECTION, SOLUTION INTRAVENOUS at 07:10

## 2020-01-01 RX ADMIN — HEPARIN SODIUM (PORCINE) LOCK FLUSH IV SOLN 100 UNIT/ML 5 ML: 100 SOLUTION at 07:41

## 2020-01-01 RX ADMIN — HEPARIN SODIUM (PORCINE) LOCK FLUSH IV SOLN 100 UNIT/ML 5 ML: 100 SOLUTION at 11:20

## 2020-01-01 RX ADMIN — HEPARIN SODIUM (PORCINE) LOCK FLUSH IV SOLN 100 UNIT/ML 5 ML: 100 SOLUTION at 13:33

## 2020-01-01 RX ADMIN — HYDROMORPHONE HYDROCHLORIDE 2 MG: 10 INJECTION, SOLUTION INTRAMUSCULAR; INTRAVENOUS; SUBCUTANEOUS at 12:06

## 2020-01-01 RX ADMIN — ENOXAPARIN SODIUM 40 MG: 40 INJECTION SUBCUTANEOUS at 08:09

## 2020-01-01 RX ADMIN — Medication 250 ML: at 08:53

## 2020-01-01 RX ADMIN — GLYCOPYRROLATE 0.2 MG: 0.2 INJECTION, SOLUTION INTRAMUSCULAR; INTRAVENOUS at 14:15

## 2020-01-01 RX ADMIN — POTASSIUM CHLORIDE: 2 INJECTION, SOLUTION, CONCENTRATE INTRAVENOUS at 08:27

## 2020-01-01 RX ADMIN — POTASSIUM CHLORIDE 40 MEQ: 750 TABLET, EXTENDED RELEASE ORAL at 10:21

## 2020-01-01 RX ADMIN — PROPOFOL 30 MG: 10 INJECTION, EMULSION INTRAVENOUS at 14:26

## 2020-01-01 RX ADMIN — FLUOXETINE 20 MG: 20 CAPSULE ORAL at 07:55

## 2020-01-01 RX ADMIN — Medication 1000 ML: at 15:28

## 2020-01-01 RX ADMIN — MIDAZOLAM 1 MG: 1 INJECTION INTRAMUSCULAR; INTRAVENOUS at 14:11

## 2020-01-01 RX ADMIN — HYDROMORPHONE HYDROCHLORIDE 4 MG: 10 INJECTION, SOLUTION INTRAMUSCULAR; INTRAVENOUS; SUBCUTANEOUS at 00:01

## 2020-01-01 RX ADMIN — POTASSIUM CHLORIDE 20 MEQ: 29.8 INJECTION, SOLUTION INTRAVENOUS at 14:59

## 2020-01-01 RX ADMIN — THIAMINE HYDROCHLORIDE 250 MG: 100 INJECTION, SOLUTION INTRAMUSCULAR; INTRAVENOUS at 20:30

## 2020-01-01 RX ADMIN — SODIUM CHLORIDE: 9 INJECTION, SOLUTION INTRAVENOUS at 12:00

## 2020-01-01 RX ADMIN — HYDROMORPHONE HYDROCHLORIDE 0.3 MG: 1 INJECTION, SOLUTION INTRAMUSCULAR; INTRAVENOUS; SUBCUTANEOUS at 16:42

## 2020-01-01 RX ADMIN — HYDROMORPHONE HYDROCHLORIDE 2 MG: 10 INJECTION, SOLUTION INTRAMUSCULAR; INTRAVENOUS; SUBCUTANEOUS at 16:04

## 2020-01-01 RX ADMIN — FLUOXETINE 20 MG: 20 CAPSULE ORAL at 19:54

## 2020-01-01 RX ADMIN — HEPARIN SODIUM (PORCINE) LOCK FLUSH IV SOLN 100 UNIT/ML 5 ML: 100 SOLUTION at 11:55

## 2020-01-01 RX ADMIN — HEPARIN SODIUM (PORCINE) LOCK FLUSH IV SOLN 100 UNIT/ML 5 ML: 100 SOLUTION at 17:04

## 2020-01-01 RX ADMIN — FENTANYL CITRATE 50 MCG: 50 INJECTION, SOLUTION INTRAMUSCULAR; INTRAVENOUS at 14:05

## 2020-01-01 RX ADMIN — ONDANSETRON 4 MG: 4 TABLET, ORALLY DISINTEGRATING ORAL at 20:21

## 2020-01-01 RX ADMIN — HYDROMORPHONE HYDROCHLORIDE 0.2 MG: 0.2 INJECTION, SOLUTION INTRAMUSCULAR; INTRAVENOUS; SUBCUTANEOUS at 11:55

## 2020-01-01 RX ADMIN — IOPAMIDOL 131 ML: 755 INJECTION, SOLUTION INTRAVASCULAR at 11:07

## 2020-01-01 RX ADMIN — Medication 5 ML: at 20:14

## 2020-01-01 RX ADMIN — HYDROMORPHONE HYDROCHLORIDE 0.3 MG: 1 INJECTION, SOLUTION INTRAMUSCULAR; INTRAVENOUS; SUBCUTANEOUS at 08:33

## 2020-01-01 RX ADMIN — HEPARIN SODIUM (PORCINE) LOCK FLUSH IV SOLN 100 UNIT/ML 5 ML: 100 SOLUTION at 15:16

## 2020-01-01 RX ADMIN — HEPARIN SODIUM (PORCINE) LOCK FLUSH IV SOLN 100 UNIT/ML 5 ML: 100 SOLUTION at 07:48

## 2020-01-01 RX ADMIN — MIDAZOLAM 1 MG: 1 INJECTION INTRAMUSCULAR; INTRAVENOUS at 14:05

## 2020-01-01 RX ADMIN — HYDROMORPHONE HYDROCHLORIDE 2 MG: 10 INJECTION, SOLUTION INTRAMUSCULAR; INTRAVENOUS; SUBCUTANEOUS at 18:01

## 2020-01-01 RX ADMIN — Medication 5 ML: at 11:12

## 2020-01-01 RX ADMIN — SODIUM CHLORIDE, POTASSIUM CHLORIDE, SODIUM LACTATE AND CALCIUM CHLORIDE 500 ML: 600; 310; 30; 20 INJECTION, SOLUTION INTRAVENOUS at 10:37

## 2020-01-01 RX ADMIN — Medication 250 ML: at 09:15

## 2020-01-01 RX ADMIN — ONDANSETRON 4 MG: 2 INJECTION INTRAMUSCULAR; INTRAVENOUS at 10:43

## 2020-01-01 RX ADMIN — POTASSIUM CHLORIDE 40 MEQ: 750 TABLET, EXTENDED RELEASE ORAL at 12:41

## 2020-01-01 RX ADMIN — DEXAMETHASONE SODIUM PHOSPHATE: 10 INJECTION, SOLUTION INTRAMUSCULAR; INTRAVENOUS at 09:37

## 2020-01-01 RX ADMIN — HYDROMORPHONE HYDROCHLORIDE 2 MG: 10 INJECTION, SOLUTION INTRAMUSCULAR; INTRAVENOUS; SUBCUTANEOUS at 05:26

## 2020-01-01 RX ADMIN — HEPARIN SODIUM (PORCINE) LOCK FLUSH IV SOLN 100 UNIT/ML 5 ML: 100 SOLUTION at 09:00

## 2020-01-01 RX ADMIN — HEPARIN SODIUM (PORCINE) LOCK FLUSH IV SOLN 100 UNIT/ML 5 ML: 100 SOLUTION at 09:11

## 2020-01-01 RX ADMIN — POTASSIUM CHLORIDE: 2 INJECTION, SOLUTION, CONCENTRATE INTRAVENOUS at 08:26

## 2020-01-01 RX ADMIN — FENTANYL CITRATE 50 MCG: 50 INJECTION, SOLUTION INTRAMUSCULAR; INTRAVENOUS at 14:11

## 2020-01-01 RX ADMIN — PROCHLORPERAZINE MALEATE 10 MG: 10 TABLET ORAL at 01:58

## 2020-01-01 RX ADMIN — HEPARIN SODIUM (PORCINE) LOCK FLUSH IV SOLN 100 UNIT/ML 5 ML: 100 SOLUTION at 16:36

## 2020-01-01 RX ADMIN — SODIUM CHLORIDE, POTASSIUM CHLORIDE, SODIUM LACTATE AND CALCIUM CHLORIDE: 600; 310; 30; 20 INJECTION, SOLUTION INTRAVENOUS at 11:56

## 2020-01-01 RX ADMIN — Medication 5 ML: at 11:39

## 2020-01-01 RX ADMIN — PIPERACILLIN AND TAZOBACTAM 3.38 G: 3; .375 INJECTION, POWDER, LYOPHILIZED, FOR SOLUTION INTRAVENOUS at 11:57

## 2020-01-01 RX ADMIN — PIPERACILLIN AND TAZOBACTAM 3.38 G: 3; .375 INJECTION, POWDER, LYOPHILIZED, FOR SOLUTION INTRAVENOUS at 03:56

## 2020-01-01 RX ADMIN — FLUOXETINE 20 MG: 20 CAPSULE ORAL at 20:14

## 2020-01-01 ASSESSMENT — PAIN SCALES - GENERAL
PAINLEVEL: NO PAIN (0)
PAINLEVEL: SEVERE PAIN (6)
PAINLEVEL: SEVERE PAIN (6)
PAINLEVEL: MODERATE PAIN (5)
PAINLEVEL: WORST PAIN (10)
PAINLEVEL: MODERATE PAIN (5)
PAINLEVEL: MODERATE PAIN (5)
PAINLEVEL: SEVERE PAIN (7)
PAINLEVEL: MILD PAIN (3)
PAINLEVEL: MILD PAIN (2)
PAINLEVEL: MILD PAIN (3)
PAINLEVEL: MODERATE PAIN (5)
PAINLEVEL: EXTREME PAIN (8)
PAINLEVEL: MODERATE PAIN (5)
PAINLEVEL: EXTREME PAIN (8)
PAINLEVEL: MODERATE PAIN (4)

## 2020-01-01 ASSESSMENT — ACTIVITIES OF DAILY LIVING (ADL)
ADLS_ACUITY_SCORE: 15
ADLS_ACUITY_SCORE: 15
ADLS_ACUITY_SCORE: 16
ADLS_ACUITY_SCORE: 15
ADLS_ACUITY_SCORE: 17
ADLS_ACUITY_SCORE: 15
ADLS_ACUITY_SCORE: 16
ADLS_ACUITY_SCORE: 17

## 2020-01-01 ASSESSMENT — ANXIETY QUESTIONNAIRES
1. FEELING NERVOUS, ANXIOUS, OR ON EDGE: NEARLY EVERY DAY
3. WORRYING TOO MUCH ABOUT DIFFERENT THINGS: NEARLY EVERY DAY
5. BEING SO RESTLESS THAT IT IS HARD TO SIT STILL: NEARLY EVERY DAY
IF YOU CHECKED OFF ANY PROBLEMS ON THIS QUESTIONNAIRE, HOW DIFFICULT HAVE THESE PROBLEMS MADE IT FOR YOU TO DO YOUR WORK, TAKE CARE OF THINGS AT HOME, OR GET ALONG WITH OTHER PEOPLE: EXTREMELY DIFFICULT
GAD7 TOTAL SCORE: 20
7. FEELING AFRAID AS IF SOMETHING AWFUL MIGHT HAPPEN: MORE THAN HALF THE DAYS
GAD7 TOTAL SCORE: 20
6. BECOMING EASILY ANNOYED OR IRRITABLE: NEARLY EVERY DAY
2. NOT BEING ABLE TO STOP OR CONTROL WORRYING: NEARLY EVERY DAY

## 2020-01-01 ASSESSMENT — MIFFLIN-ST. JEOR
SCORE: 1715.44
SCORE: 1657
SCORE: 1612.91
SCORE: 1590.23
SCORE: 1731.31

## 2020-01-01 ASSESSMENT — PATIENT HEALTH QUESTIONNAIRE - PHQ9
SUM OF ALL RESPONSES TO PHQ QUESTIONS 1-9: 26
5. POOR APPETITE OR OVEREATING: NEARLY EVERY DAY

## 2020-01-01 ASSESSMENT — ENCOUNTER SYMPTOMS
WEAKNESS: 1
ABDOMINAL PAIN: 1

## 2020-01-01 ASSESSMENT — LIFESTYLE VARIABLES: TOBACCO_USE: 0

## 2020-03-05 PROBLEM — K59.00 CONSTIPATION, UNSPECIFIED CONSTIPATION TYPE: Status: ACTIVE | Noted: 2020-01-01

## 2020-03-05 PROBLEM — Z83.3 FAMILY HISTORY OF DIABETES MELLITUS: Status: ACTIVE | Noted: 2020-01-01

## 2020-03-05 PROBLEM — R32 URINARY INCONTINENCE, UNSPECIFIED TYPE: Status: ACTIVE | Noted: 2020-01-01

## 2020-03-05 PROBLEM — E66.01 MORBID OBESITY (H): Status: ACTIVE | Noted: 2020-01-01

## 2020-03-05 PROBLEM — E11.9 DIABETES MELLITUS, TYPE 2 (H): Status: ACTIVE | Noted: 2020-01-01

## 2020-03-05 PROBLEM — R73.9 HYPERGLYCEMIA: Status: ACTIVE | Noted: 2020-01-01

## 2020-03-05 NOTE — PROGRESS NOTES
"Subjective     Joesph Zeng is a 49 year old female who presents to clinic today for the following health issues:    HPI     ABDOMINAL   PAIN     Onset: couple of weeks    Description:   Character: Stabbing  Location: epigastric region  Radiation: \"all over\"     Intensity: moderate to severe    Progression of Symptoms:  waxing and waning    Accompanying Signs & Symptoms:  Fever/Chills?: no   Gas/Bloating: YES  Nausea: YES  Vomitting: no   Diarrhea?: no   Constipation:YES-takes dulcolax nightly for last two weeks; if not taking 2-3 days in between bowel movement  bowel movement   Dysuria or Hematuria: no    History:   Trauma: no   Previous similar pain: no    Previous tests done: none    Precipitating factors:   Does the pain change with:     Food: no      BM: YES- gets better     Urination: no     Alleviating factors:  none    Therapies Tried and outcome: laxatives    LMP:  No longer having periods     Back Pain       Duration: \"weeks\"        Specific cause: don't know    Description:   Location of pain: middle of back   Character of pain: sharp  Pain radiation:none  New numbness or weakness in legs, not attributed to pain:  no     Intensity: Currently 5/10    History:   Pain interferes with job: YES  History of back problems: no prior back problems  Any previous MRI or X-rays: None  Sees a specialist for back pain:  No  Therapies tried without relief: none    Alleviating factors:   Improved by: none      Precipitating factors:  Worsened by: Standing and with bloating         Headache  Onset: weeks    Description:   Location: bilateral in the frontal area, bilateral in the temporal area   Character: throbbing pain  Frequency:  3-4 times a day x weeks   Duration:  Last all day    Intensity: moderate    Progression of Symptoms:  same    Accompanying Signs & Symptoms:  Stiff neck: YES  Neck or upper back pain: YES  Fever: no   Sinus pressure: no   Nausea or vomiting: no   Dizziness: no   Numbness: no   Weakness: no "   Visual changes: no     History:   Head trauma: no   Family history of migraines: no   Previous tests for headaches: no   Neurologist evaluations: no   Able to do daily activities: No  Wake with a headaches: YES  Do headaches wake you up: no   Daily pain medication use: no   Work/school stressors/changes: no      Precipitating factors:   Does light make it worse: YES  Does sound make it worse: YES    Alleviating factors:  Does sleep help: YES    Therapies Tried and outcome: none      Concern - testing for glucose  Onset: weeks    Description:   Constantly feeling thirsty, tired after consuming large amount of food with sodium, break out in cold sweat    Therapies Tried and outcome: none    Concern - Urinary Incontinence   Onset: 6 months    Description:   No longer able to hold bladder, bladder leaks    Therapies Tried and outcome: none      Does not drink any water.  States used to be able to hold urine all day long.  She states has depression so getting up to go to the bathroom is even hard for her.  She states when she eats for the last two weeks she has severe abdominal cramping that radiates around to her lower back.  She states this is worsening over the last 24 hours.  Used chinese herbal tea which helped her have a bowel movement which relieved the pain temporarily but it cam eback when she tried to eat again.  She has been taking dulcolax nightly for the last two weeks because if she does not then she cannot have bowel movement for 2-3 days.    Denies history of thyroid problems, diabetes.  Mother has diabetes.  She receives mental health treatment from her [facility] through [facility].      Patient Active Problem List   Diagnosis     Morbid obesity (H)     Panic disorder with agoraphobia     PTSD (post-traumatic stress disorder)     Seasonal affective disorder (H)     Severe recurrent major depressive disorder with psychotic features (H)     Vitamin D deficiency     History reviewed. No pertinent  surgical history.    Social History     Tobacco Use     Smoking status: Never Smoker     Smokeless tobacco: Never Used   Substance Use Topics     Alcohol use: No     Family History   Problem Relation Age of Onset     Diabetes Mother      Hypertension Mother          Current Outpatient Medications   Medication Sig Dispense Refill     diazepam (VALIUM) 5 MG tablet Take 5 mg by mouth       docusate sodium (COLACE) 100 MG capsule Take 1 capsule (100 mg) by mouth 2 times daily 60 capsule 1     mirtazapine (REMERON) 15 MG tablet Take 15 mg by mouth       polyethylene glycol (MIRALAX) powder Take 17 g (1 capful) by mouth daily 500 g 0     FLUoxetine 20 MG tablet 20 mg 2 times daily  5     risperiDONE (RISPERDAL) 1 MG tablet 1 mg once  5     No Known Allergies  BP Readings from Last 3 Encounters:   03/05/20 125/84   09/05/14 128/82    Wt Readings from Last 3 Encounters:   03/05/20 121.3 kg (267 lb 6.4 oz)   09/05/14 120.4 kg (265 lb 6 oz)                      Reviewed and updated as needed this visit by Provider         Review of Systems   ROS COMP: Constitutional, HEENT, cardiovascular, pulmonary, GI, , musculoskeletal, neuro, skin, endocrine and psych systems are negative, except as otherwise noted.      Objective    /84 (BP Location: Left arm, Patient Position: Chair, Cuff Size: Adult Large)   Pulse 80   Temp 98.1  F (36.7  C) (Oral)   Resp 20   Wt 121.3 kg (267 lb 6.4 oz)   SpO2 96%   BMI 47.37 kg/m    Body mass index is 47.37 kg/m .  Physical Exam   GENERAL: healthy, alert and no distress  EYES: Eyes grossly normal to inspection, PERRL and conjunctivae and sclerae normal  HENT: ear canals and TM's normal, nose and mouth without ulcers or lesions  NECK: no adenopathy, no asymmetry, masses, or scars and thyroid normal to palpation  RESP: lungs clear to auscultation - no rales, rhonchi or wheezes  CV: regular rate and rhythm, normal S1 S2, no S3 or S4, no murmur, click or rub, no peripheral edema and  peripheral pulses strong  ABDOMEN: tender mildly throughout.  Worse in epigastrum.bowel sounds hypoactive, abdomen soft.    MS: no gross musculoskeletal defects noted, no edema  BACK: no CVA tenderness, no paralumbar tenderness  PSYCH: mentation appears normal, affect normal/bright    Diagnostic Test Results:  Labs reviewed in Epic  Results for orders placed or performed in visit on 03/05/20 (from the past 24 hour(s))   CBC with platelets and differential   Result Value Ref Range    WBC 6.1 4.0 - 11.0 10e9/L    RBC Count 5.22 (H) 3.8 - 5.2 10e12/L    Hemoglobin 15.2 11.7 - 15.7 g/dL    Hematocrit 46.7 35.0 - 47.0 %    MCV 90 78 - 100 fl    MCH 29.1 26.5 - 33.0 pg    MCHC 32.5 31.5 - 36.5 g/dL    RDW 13.0 10.0 - 15.0 %    Platelet Count 194 150 - 450 10e9/L    % Neutrophils 59.4 %    % Lymphocytes 30.8 %    % Monocytes 6.0 %    % Eosinophils 3.3 %    % Basophils 0.5 %    Absolute Neutrophil 3.6 1.6 - 8.3 10e9/L    Absolute Lymphocytes 1.9 0.8 - 5.3 10e9/L    Absolute Monocytes 0.4 0.0 - 1.3 10e9/L    Absolute Eosinophils 0.2 0.0 - 0.7 10e9/L    Absolute Basophils 0.0 0.0 - 0.2 10e9/L    Diff Method Automated Method    Hemoglobin A1c   Result Value Ref Range    Hemoglobin A1C 6.6 (H) 0 - 5.6 %   *UA reflex to Microscopic and Culture ([place] [facility] (except [place] and [place])   Result Value Ref Range    Color Urine Yellow     Appearance Urine Clear     Glucose Urine Negative NEG^Negative mg/dL    Bilirubin Urine Negative NEG^Negative    Ketones Urine Negative NEG^Negative mg/dL    Specific Gravity Urine 1.025 1.003 - 1.035    Blood Urine Negative NEG^Negative    pH Urine 6.0 5.0 - 7.0 pH    Protein Albumin Urine Negative NEG^Negative mg/dL    Urobilinogen Urine 0.2 0.2 - 1.0 EU/dL    Nitrite Urine Negative NEG^Negative    Leukocyte Esterase Urine Small (A) NEG^Negative    Source Midstream Urine    HCG Qual, Urine ([id number])   Result Value Ref Range    HCG Qual Urine Negative NEG^Negative   Urine  Microscopic   Result Value Ref Range    WBC Urine 5-10 (A) OTO5^0 - 5 /HPF    RBC Urine O - 2 OTO2^O - 2 /HPF    Squamous Epithelial /LPF Urine Many (A) FEW^Few /LPF    Bacteria Urine Few (A) NEG^Negative /HPF    Mucous Urine Present (A) NEG^Negative /LPF       ABDOMEN TWO TO THREE VIEWS 3/5/2020 11:28 AM      HISTORY: Abdominal pain, generalized.     COMPARISON: None.     FINDINGS: Moderate  amount of stool. No free air. There are no air  filled distended loops of small bowel. The colon is not distended. The  lung bases are unremarkable.                                                                      IMPRESSION: Nonobstructed bowel gas pattern.     ERICA MOYA MD    Assessment & Plan     1. Abdominal pain, generalized  Could be related to constipation and/or UTI.  Treatment for that prescribed.  Will also follow up on labs as below.    - *UA reflex to Microscopic and Culture ([place] [facility] (except [place] and [place])  - Comprehensive metabolic panel (BMP + Alb, Alk Phos, ALT, AST, Total. Bili, TP)  - HCG Qual, Urine ([id number])  - XR Abdomen 2 Views; Future  - TSH with free T4 reflex  - CBC with platelets and differential  - Urine Microscopic  - Urine Culture Aerobic Bacterial    2. Morbid obesity (H)  - Hemoglobin A1c    3. Constipation, unspecified constipation type  Treatment as below   - polyethylene glycol (MIRALAX) powder; Take 17 g (1 capful) by mouth daily  Dispense: 500 g; Refill: 0  - docusate sodium (COLACE) 100 MG capsule; Take 1 capsule (100 mg) by mouth 2 times daily  Dispense: 60 capsule; Refill: 1  - TSH with free T4 reflex    4. Family history of diabetes mellitus  - Hemoglobin A1c    5. Constipation, unspecified constipation type   - polyethylene glycol (MIRALAX) powder; Take 17 g (1 capful) by mouth daily  Dispense: 500 g; Refill: 0  - docusate sodium (COLACE) 100 MG capsule; Take 1 capsule (100 mg) by mouth 2 times daily  Dispense: 60 capsule; Refill: 1  - TSH with free T4  reflex    6. Other hyperglycemia   Lab Results   Component Value Date    A1C 6.6 03/05/2020     She will follow up next week to discuss new diagnosis of diabetes.   - Hemoglobin A1c    7. Urinary incontinence, unspecified type  - Urine Culture Aerobic Bacterial    8. Acute cystitis with hematuria  Will treat given symptoms.  If culture negative, repeat UA.  If RBC persistent consider CT to rule out kidney stones.  - sulfamethoxazole-trimethoprim (BACTRIM DS) 800-160 MG tablet; Take 1 tablet by mouth 2 times daily for 5 days  Dispense: 10 tablet; Refill: 0       Patient Instructions   Appears you could have a UTI.  You also have some constipation.    Take Miralax daily and colace twice a day to help with constipation..    Take Bactrim twice a day for 5 days.    To help with constipation:  -drink 8-10 glasses of WATER a day  -get regular exercise  -eat foods high in fiber like fruits and vegetables  -limit your carb intake (plate method like we talked)  -can take metamucil daily to help with fiber    Come back next week to follow up on blood sugar.  Please come fasting.      No follow-ups on file.    CRISTINE Hampton [facility]

## 2020-03-05 NOTE — PATIENT INSTRUCTIONS
Appears you could have a UTI.  You also have some constipation.    Take Miralax daily and colace twice a day to help with constipation..    Take Bactrim twice a day for 5 days.    To help with constipation:  -drink 8-10 glasses of WATER a day  -get regular exercise  -eat foods high in fiber like fruits and vegetables  -limit your carb intake (plate method like we talked)  -can take metamucil daily to help with fiber    Come back next week to follow up on blood sugar.  Please come fasting.

## 2020-03-09 NOTE — LETTER
"March 16, 2020      Joesph Zeng  [address]  [place] [address]        Dear Joesph,     Your lab results are attached.  Your testing for anemia and infection (CBC) was negative.  Your kidney function was normal.  Your thyroid function was normal.  Your blood sugar was elevated so I check a Hemoglobin A1c.  A hemoglobin A1c (called \"a1c\" for short) is a level we use to measure your blood sugar over the last 2-3 months.  You can think of it like an \"average\".  It is not affected by what you ate the day of your blood draw like a normal blood sugar is.    A normal A1c is < 5.6 %  Prediabetes is an A1c of 5.8-6.4 %  Diabetes is an A1c of >6.5 %  Your level was 6.6% which puts you in the diabetic range.  It is not high enough to indicate blood sugar-lowering medication at this time, but we need to discuss other medications to protect your heart and kidneys from the effects of diabetes.  At this time, we are limiting in-person visits due to COVID-19, so please call [phone] to make a telephone visit to discuss this further    Additionally, your liver enzymes were elevated.  I would like to do some additional blood work and an ultrasound to assess this further.  Please call [phone] to schedule your ultrasound and a lab-only visit.        Sincerely,        CRISTINE Hampton CNP          "

## 2020-03-09 NOTE — TELEPHONE ENCOUNTER
Notes recorded by Mitra Delgadillo APRN CNP on 3/9/2020 at 8:40 AM CDT   Please call patient to discuss lab results.  Liver enzymes slightly elevated but all other labs normal.  We will discuss this at her visit on Thursday and possible need for further work up.   CRISTINE Hampton CNP     This writer attempted to contact [facility]  on 03/09/20      Reason for call lab results and unable to leave message. Message states voice mail is full.       If patient calls back:   Registered Nurse called. Follow Triage Call workflow      Merlyn Rodriguez RN, BSN, PHN

## 2020-03-10 NOTE — TELEPHONE ENCOUNTER
This writer attempted to contact patient on 03/10/20      Reason for call discuss results and unable to leave message as the mailbox is full.       If patient calls back:   Registered Nurse called. Follow Triage Call workflow        Karli Betancur RN

## 2020-03-11 NOTE — TELEPHONE ENCOUNTER
This writer attempted to contact patient on 03/11/20      Reason for call results and left message.      If patient calls back:   Registered Nurse called. Follow Triage Call workflow        Ly Watkins RN

## 2020-03-12 NOTE — TELEPHONE ENCOUNTER
This writer attempted to contact patient on 03/12/20      Reason for call results and unable to leave message.      If patient calls back:   Registered Nurse called. Follow Triage Call workflow    Routing to provider to please advise.         Karli Betancur RN

## 2020-03-16 PROBLEM — R79.89 ELEVATED LFTS: Status: ACTIVE | Noted: 2020-01-01

## 2020-03-16 NOTE — TELEPHONE ENCOUNTER
Sent signed letter certified, [id number] [phone]. This will go out in tomorrow's mail.  Jina Núñez MA  [facility]  2nd Floor  Primary Care

## 2020-04-08 NOTE — TELEPHONE ENCOUNTER
"Received Unsigned certified [id number] [phone] RETURN letter. \"Unclaimed, unable to forward\". Copy to  and abstracting.  Jina Núñez MA  [facility]  2nd Floor  Primary Care    "

## 2020-07-02 NOTE — LETTER
July 3, 2020      Joesph Zeng  [address] [facility] [address]        Dear ,    We are writing to inform you of your recent test results as below.    Results showed normal kidney and pancreas function. AST and ALT liver tests are now back to normal. Complete blood cell counts show no concerning findings.   You are not anemic. Electrolytes such as sodium and potassium were normal. Platelets were slightly low but not worrisome. Enclosed is a copy of these results.     If you have any questions or concerns, please call the clinic at the number listed above.   Thank you for choosing [facility].        Sincerely,      Daphnie Leal PA-C        Resulted Orders   CBC with platelets and differential   Result Value Ref Range    WBC 5.9 4.0 - 11.0 10e9/L    RBC Count 5.10 3.8 - 5.2 10e12/L    Hemoglobin 15.0 11.7 - 15.7 g/dL    Hematocrit 46.1 35.0 - 47.0 %    MCV 90 78 - 100 fl    MCH 29.4 26.5 - 33.0 pg    MCHC 32.5 31.5 - 36.5 g/dL    RDW 12.7 10.0 - 15.0 %    Platelet Count 134 (L) 150 - 450 10e9/L    % Neutrophils 61.4 %    % Lymphocytes 28.5 %    % Monocytes 7.6 %    % Eosinophils 2.2 %    % Basophils 0.3 %    Absolute Neutrophil 3.6 1.6 - 8.3 10e9/L    Absolute Lymphocytes 1.7 0.8 - 5.3 10e9/L    Absolute Monocytes 0.5 0.0 - 1.3 10e9/L    Absolute Eosinophils 0.1 0.0 - 0.7 10e9/L    Absolute Basophils 0.0 0.0 - 0.2 10e9/L    Diff Method Automated Method    Lipase   Result Value Ref Range    Lipase 152 73 - 393 U/L   Comprehensive metabolic panel (BMP + Alb, Alk Phos, ALT, AST, Total. Bili, TP)   Result Value Ref Range    Sodium 138 133 - 144 mmol/L    Potassium 3.9 3.4 - 5.3 mmol/L    Chloride 102 94 - 109 mmol/L    Carbon Dioxide 29 20 - 32 mmol/L    Anion Gap 7 3 - 14 mmol/L    Glucose 97 70 - 99 mg/dL      Comment:      Non Fasting    Urea Nitrogen 10 7 - 30 mg/dL    Creatinine 0.80 0.52 - 1.04 mg/dL    GFR Estimate 87 >60 mL/min/[1.73_m2]      Comment:      Non  GFR  Calc  Starting 12/18/2018, serum creatinine based estimated GFR (eGFR) will be   calculated using the Chronic Kidney Disease Epidemiology Collaboration   (CKD-EPI) equation.      GFR Estimate If Black >90 >60 mL/min/[1.73_m2]      Comment:       GFR Calc  Starting 12/18/2018, serum creatinine based estimated GFR (eGFR) will be   calculated using the Chronic Kidney Disease Epidemiology Collaboration   (CKD-EPI) equation.      Calcium 9.0 8.5 - 10.1 mg/dL    Bilirubin Total 0.7 0.2 - 1.3 mg/dL    Albumin 3.3 (L) 3.4 - 5.0 g/dL    Protein Total 9.6 (H) 6.8 - 8.8 g/dL    Alkaline Phosphatase 110 40 - 150 U/L    ALT 43 0 - 50 U/L    AST 45 0 - 45 U/L

## 2020-07-02 NOTE — PROGRESS NOTES
S: 49-year-old female presents for abdominal pain for several months.  She gets flares every 2 to 3 days.  Does have a history of constipation for which she takes MiraLAX and Colace.  Seen in March for this.  Labs were negative other than some elevated liver enzymes.  Abdominal films showed nonobstructive gas pattern.  Moderate stool was present.  She has never had a colonoscopy.  She is status post cholecystectomy.  No fever or weight loss.  Has had nausea.  Tried over-the-counter Prilosec which helped at first but now it no longer is helping.  She reports belching gas, bloating, acid taste.  Intermittent abdominal cramping.  No blood in the stools.  At times the gas is so bad that it radiates into her chest and she occasionally has chest pain with it.  Tried over the counter Gas-X with no relief.  No diarrhea.    No Known Allergies    No past medical history on file.    diazepam (VALIUM) 5 MG tablet, Take 5 mg by mouth  FLUoxetine 20 MG tablet, 20 mg 2 times daily  mirtazapine (REMERON) 15 MG tablet, Take 15 mg by mouth  risperiDONE (RISPERDAL) 1 MG tablet, 1 mg once  docusate sodium (COLACE) 100 MG capsule, Take 1 capsule (100 mg) by mouth 2 times daily (Patient not taking: Reported on 2020)  polyethylene glycol (MIRALAX) powder, Take 17 g (1 capful) by mouth daily (Patient not taking: Reported on 2020)  [] sulfamethoxazole-trimethoprim (BACTRIM DS) 800-160 MG tablet, Take 1 tablet by mouth 2 times daily for 5 days    No current facility-administered medications on file prior to visit.       Social History     Tobacco Use     Smoking status: Never Smoker     Smokeless tobacco: Never Used   Substance Use Topics     Alcohol use: No       ROS:  CONSTITUTIONAL: Negative for fatigue or fever.  EYES: Negative for eye problems.  ENT: As above.  RESP: As above.  CV: Negative for chest pains.  GI: Negative for vomiting.  MUSCULOSKELETAL:  Negative for significant muscle or joint pains.  NEUROLOGIC: Negative  for headaches.  SKIN: Negative for rash.  PSYCH: Normal mentation for age.    OBJECTIVE:  /74 (BP Location: Left arm, Patient Position: Sitting, Cuff Size: Adult Large)   Pulse 67   Temp 96.6  F (35.9  C) (Tympanic)   Resp 16   Wt 113.5 kg (250 lb 3.2 oz)   SpO2 97%   BMI 44.32 kg/m    GENERAL APPEARANCE: Healthy, alert and no distress.  EYES:Conjunctiva/sclera clear.  EARS: No cerumen.   Ear canals w/o erythema.  TM's intact w/o erythema.    NOSE/MOUTH: Nose without ulcers, erythema or lesions.  SINUSES: No maxillary sinus tenderness.  THROAT: No erythema w/o tonsillar enlargement . No exudates.  NECK: Supple, nontender, no lymphadenopathy.  RESP: Lungs clear to auscultation - no rales, rhonchi or wheezes  CV: Regular rate and rhythm, normal S1 S2, no murmur noted.  NEURO: Awake, alert    SKIN: No rashes  Abdomen-soft, hyperactive bowel sounds.  Nontender.  No hepatosplenomegaly.  No rigidity, guarding or rebound.  Chest wall is nontender to palpation  EKG-rate 66.  Sinus rhythm.  Within normal limits.  I do not see any acute changes such as ST elevation, depression or T wave changes to suggest ischemia.    ASSESSMENT:     ICD-10-CM    1. Abdominal pain, generalized  R10.84 EKG 12-lead complete w/read - Clinics     CBC with platelets and differential     Lipase     Comprehensive metabolic panel (BMP + Alb, Alk Phos, ALT, AST, Total. Bili, TP)     Occult blood stool     Helicobacter pylori Antigen Stool     GASTROENTEROLOGY ADULT REF CONSULT ONLY     omeprazole (PRILOSEC) 40 MG DR capsule     simethicone (MYLICON) 125 MG chewable tablet   2. Nausea  R11.0 EKG 12-lead complete w/read - Clinics     CBC with platelets and differential     Lipase     Comprehensive metabolic panel (BMP + Alb, Alk Phos, ALT, AST, Total. Bili, TP)     Occult blood stool     Helicobacter pylori Antigen Stool     GASTROENTEROLOGY ADULT REF CONSULT ONLY     omeprazole (PRILOSEC) 40 MG DR capsule     simethicone (MYLICON) 125 MG  chewable tablet   3. Bloating  R14.0 EKG 12-lead complete w/read - Clinics     CBC with platelets and differential     Lipase     Comprehensive metabolic panel (BMP + Alb, Alk Phos, ALT, AST, Total. Bili, TP)     Occult blood stool     Helicobacter pylori Antigen Stool     GASTROENTEROLOGY ADULT REF CONSULT ONLY     omeprazole (PRILOSEC) 40 MG DR capsule     simethicone (MYLICON) 125 MG chewable tablet   4. Constipation, unspecified constipation type  K59.00 CBC with platelets and differential     Lipase     Comprehensive metabolic panel (BMP + Alb, Alk Phos, ALT, AST, Total. Bili, TP)     Occult blood stool     Helicobacter pylori Antigen Stool     GASTROENTEROLOGY ADULT REF CONSULT ONLY     omeprazole (PRILOSEC) 40 MG DR capsule     simethicone (MYLICON) 125 MG chewable tablet   5. Gastroesophageal reflux disease with esophagitis  K21.0 omeprazole (PRILOSEC) 40 MG DR capsule     simethicone (MYLICON) 125 MG chewable tablet     PLAN: Try omeprazole 40 mg a day.  Also try simethicone.  Will contact with lab results of CBC, lipase, comprehensive metabolic panel, stool for blood and stool for H. pylori.  Gastroenterology referral.  I have discussed clinical findings with patient.  Side effects of medications discussed.  Symptomatic care is discussed.  I have discussed the possibility of  worsening symptoms and indication to RTC or go to the ER if they occur.  All questions are answered, patient indicates understanding of these issues and is in agreement with plan.   Patient care instructions are discussed/given at the end of visit.   Lots of rest and fluids.    Daphnie Leal PA-C

## 2020-07-10 NOTE — TELEPHONE ENCOUNTER
Jarret from imaging called regarding patient's ultra sound imaging today. Please see ultra sound imaging results from 7/10/20.  Will route to [facility] as ordering provider not in clinic.     Juhi Sheets RN

## 2020-07-10 NOTE — TELEPHONE ENCOUNTER
Spoke with patient about her US result.  I have ordered a CT abdomen and pelvis with contrast which is scheduled for 7/13/20 at 4pm.    Daphnie COLUNGA, CNP

## 2020-07-10 NOTE — TELEPHONE ENCOUNTER
Left voice mail for patient on her cell phone stating it was important that I speak with her today regarding her ultrasound result. Home phone goes directly to answering machine which is full.  Daphnie COLUNGA, CNP

## 2020-07-15 NOTE — TELEPHONE ENCOUNTER
Patient may have specific urgent questions on this type of result that RN may not be able to answer. RN feels that this type of result would be more appropriate for provider to review with patient.    Will route to [facility] as Mitra out of clinic.     Thank you,  Juhi Sheets RN

## 2020-07-15 NOTE — TELEPHONE ENCOUNTER
Please notify patient that her CT scan shows a liver mass consistent with malignancy.  I have entered referrals to oncology and hepatology.  She should call to schedule with both as soon as possible.    CRISTINE Hampton CNP

## 2020-07-15 NOTE — TELEPHONE ENCOUNTER
Reason for Call:  Other Results      Detailed comments: Pt would like to request a call back regarding her MRI results. Thank you.    Phone Number Patient can be reached at: Cell number on file:    Telephone Information:   Mobile [phone]       Best Time: Any    Can we leave a detailed message on this number? YES    Call taken on 7/15/2020 at 9:38 AM by Leonila Hinson

## 2020-07-15 NOTE — TELEPHONE ENCOUNTER
I would recommend scheduling a Virtual visit in order to discuss the findings in detail.    Thank you.    Tanya Austin MD MPH

## 2020-07-16 NOTE — PROGRESS NOTES
New Patient Oncology Nurse Navigator Note     Referring provider: Dr Delgadillo PCP    Referring Clinic/Organization: [facility]     Referred to: Medical Oncology    Requested provider (if applicable): First available - did not specify     Referral Received: 07/15/20       Evaluation for : liver mass     Clinical History (per Nurse review of records provided):      7/13/20 CT Abd/Pelvis show liver mass w significant necrotic lymphadenopathy in the retroperitoneum, most consistent with malignancy, suspect liver primary    Clinical Assessment / Barriers to Care (Per Nurse):    Pt requires med onc evaluation & coordination of further work up (may need EUS, PET to further stage with definitive diagnosis)    Pt is agreeable to do TELEPHONE visit with Dr Fraser tomorrow, Friday 7/17/20 at 2:30pm (use [facility] schedule).       Records Location: [facility]     Records Needed:     None    Additional testing needed prior to consult:     N/A    Gurjit Vinson, RN, BSN, OCN  Oncology New Patient Nurse Navigator   [facility]  [phone]

## 2020-07-16 NOTE — TELEPHONE ENCOUNTER
Patient updated on the below, no questions at this time, verbalized understanding.    Ly Watkins RN

## 2020-07-16 NOTE — TELEPHONE ENCOUNTER
ONCOLOGY INTAKE: Records Information      APPT INFORMATION:  Referring provider:  Mitra Delgadillo APRN CNP  Referring provider s clinic:  [facility]  Reason for visit/diagnosis:  Liver Mass    Has patient been notified of appointment date and time?: Yes    RECORDS INFORMATION:  Were the records received with the referral (via [employer])? No    Has patient been seen for any external appt for this diagnosis? No    If yes, where? N/a    Has patient had any imaging or procedures outside of [facility] for this condition? No      If Yes, where? N/a    ADDITIONAL INFORMATION:  Scheduling instructions via IB

## 2020-07-16 NOTE — TELEPHONE ENCOUNTER
Patient is requesting medication for nausea in the meantime until she can get into Oncology. Please advise; pharmacy is [facility] pharmacy in [place] off [address].    Gave patient providers message in regards to CT scan findings. Patient was driving so she could not take down phone numbers to schedule with oncology.      Merlyn Rodriguez RN, BSN, PHN

## 2020-07-16 NOTE — TELEPHONE ENCOUNTER
RECORDS STATUS - ALL OTHER DIAGNOSIS      RECORDS RECEIVED FROM: [facility]   DATE RECEIVED: 7/17/2020    NOTES STATUS DETAILS   OFFICE NOTE from referring provider Complete EPIC   OFFICE NOTE from medical oncologist N/A    DISCHARGE SUMMARY from hospital N/A    DISCHARGE REPORT from the ER     OPERATIVE REPORT N/A    MEDICATION LIST     CLINICAL TRIAL TREATMENTS TO DATE     LABS     PATHOLOGY REPORTS N/A Liver Mass   ANYTHING RELATED TO DIAGNOSIS Complete Labs last updated on 7/2/2020    GENONOMIC TESTING     TYPE:     IMAGING (NEED IMAGES & REPORT)     CT SCANS Complete CT Abdomen Pelvis 7/13/2020    MRI     Xray Abdomen  Complete 3/5/2020   MAMMO     ULTRASOUND Complete US Abdomen Complete 7/10/2020   PET       Action    Action Taken 7/16/2020 2:29pm      I called pt Joesph - her phone went to .

## 2020-07-17 NOTE — PROGRESS NOTES
[facility]  MEDICAL ONCOLOGY    NEW PATIENT VISIT NOTE    Reason for consultation: Liver mass     Referring Provider: Mitra Delgadillo MD     HISTORY OF PRESENTING ILLNESS  Patient is a very pleasant 49-year-old female who is seen today for a telephone visit.  She relates that she began having symptoms last October 2019 when she had problems feeling bloated and constipated.  She had nausea associated with this off and on and it continued until mid December when suddenly 1 day she noted pressure in her upper abdomen.  This was rather severe but eventually passed.  She then continued in her normal state until March when she began noting polyuria and polydipsia.  At that time she went and saw her primary care provider to have diabetes test done which did not have particular the reveal anything untoward.  She eventually had some 30+ pound weight loss along with her GI symptoms and has noted a lot of postprandial pain anticipatory pain with eating if she eats less than a cup at a time because of the pain associated with it.  She was eventually sent for abdominal ultrasound on 7/10/2020 that showed a large mass in the upper abdomen along with a large liver mass.  This then prompted a CT of the abdomen pelvis performed on 7/13/2020.    CT demonstrated a large hypoattenuating mass in the inferior right lobe of the liver (segment 6) measuring up to 6.5 cm.  Additionally there was a mass in the caudate lobe of the liver which appear to surround the portal vein.  This measured up to 6 cm.  There was a large hypoattenuating necrotic lymph node in the aortocaval region measuring 3.5 cm.  Other retroperitoneal lymphadenopathy was seen posterior to the left renal vein measuring up to 2.6 cm.  No other abnormalities were noted at that time.    She was thus referred to medical oncology and has not had any further work-up today.         PAST MEDICAL HISTORY  Depression, PTSD, cholecystectomy      CURRENT  OUTPATIENT MEDICATIONS  Current Outpatient Medications   Medication     diazepam (VALIUM) 5 MG tablet     docusate sodium (COLACE) 100 MG capsule     FLUoxetine 20 MG tablet     mirtazapine (REMERON) 15 MG tablet     omeprazole (PRILOSEC) 40 MG DR capsule     ondansetron (ZOFRAN-ODT) 4 MG ODT tab     polyethylene glycol (MIRALAX) powder     risperiDONE (RISPERDAL) 1 MG tablet     No current facility-administered medications for this visit.         ALLERGIES   No Known Allergies     SOCIAL HISTORY  No tobacco use, she is single and has a fiancé.  She lives in [place].  She has 7 children.  She works at home.  No tobacco or excessive alcohol use.     FAMILY HISTORY  No malignancies in her family history that she is aware of     REVIEW OF SYSTEMS  Review Of Systems  Skin: negative  Eyes: negative  Ears/Nose/Throat: negative  Respiratory: No shortness of breath, dyspnea on exertion, cough, or hemoptysis  Cardiovascular: negative  Gastrointestinal: negative per HPI  Genitourinary: negative  Musculoskeletal: negative  Neurologic: negative  Psychiatric: negative  Hematologic/Lymphatic/Immunologic: negative  Endocrine: negative      PHYSICAL EXAM  B/P: Data Unavailable, T: Data Unavailable, P: Data Unavailable, R: Data Unavailable  Wt Readings from Last 3 Encounters:   07/02/20 113.5 kg (250 lb 3.2 oz)   03/05/20 121.3 kg (267 lb 6.4 oz)   09/05/14 120.4 kg (265 lb 6 oz)         GEN: NAD  She is conversational with a normal affect.  She is obviously concerned about what is going on.          LABORATORY AND IMAGING STUDIES  Recent Labs   Lab Test 07/02/20  1402 03/05/20  1102    136   POTASSIUM 3.9 3.9   CHLORIDE 102 103   CO2 29 26   ANIONGAP 7 7   BUN 10 15   CR 0.80 0.63   GLC 97 126*   IMELDA 9.0 8.8     No results for input(s): MAG, PHOS in the last 44251 hours.  Recent Labs   Lab Test 07/02/20  1402 03/05/20  1102   WBC 5.9 6.1   HGB 15.0 15.2   * 194   MCV 90 90   NEUTROPHIL 61.4 59.4     Recent Labs    Lab Test 07/02/20  1402 03/05/20  1102   BILITOTAL 0.7 0.8   ALKPHOS 110 114   ALT 43 89*   AST 45 90*   ALBUMIN 3.3* 3.3*     TSH   Date Value Ref Range Status   03/05/2020 3.20 0.40 - 4.00 mU/L Final     No results for input(s): CEA in the last 04943 hours.  Results for orders placed or performed during the hospital encounter of 07/13/20   CT Abdomen Pelvis w Contrast    Narrative    CT ABDOMEN PELVIS WITH CONTRAST  7/13/2020 3:57 PM    HISTORY: Large abdominal mass seen on ultrasound 7/10/20, concern is  for metastasis. Abdominal pain, unspecified abdominal location.    TECHNIQUE: Scans obtained from the diaphragm through the pelvis with  IV contrast, Isovue 370, 100mL.   Radiation dose for this scan was reduced using automated exposure  control, adjustment of the mA and/or kV according to patient size, or  iterative reconstruction technique.    COMPARISON:  Limited abdominal ultrasound dated 7/10/2020.    FINDINGS: Visualized portions of the lung bases and mediastinal  contents are grossly unremarkable. No aggressive osseous lesions or  acute osseous fractures are seen. There are degenerative changes in  the spine. Sclerosis at the left SI joint could represent chronic  sacral ileitis versus degenerative change.    There is a large hypoattenuating mass in the inferior right lobe of  the liver (segment 6) measuring up to 6.5 cm in diameter. Additionally  there is a mass in the caudate lobe of the liver which appears to  surround the portal vein. This measures up to 6.0 cm in diameter.  There is a large hypoattenuating necrotic lymph node in the aortocaval  region (image 33 series 2) measuring up to 3.5 cm in short axis. Other  retroperitoneal lymphadenopathy seen posterior to the left renal vein  measuring up to 2.6 cm in short axis. The liver, pancreas, spleen,  bilateral adrenal glands, and bilateral kidneys otherwise enhance  normally. No hydronephrosis, nephrolithiasis, hydroureter or ureteral  calculus is  identified. Urinary bladder is mostly decompressed but is  otherwise unremarkable.    No other significant lymphadenopathy is seen in the abdomen or pelvis.  No free fluid or free air is seen in the peritoneal cavity. Uterus and  ovaries are grossly unremarkable.    The colon is of normal caliber without pericolonic inflammatory change  to suggest acute diverticulitis. Appendix is not well seen. No obvious  pericecal inflammatory changes are seen. Small bowel is grossly of  normal caliber. Stomach contains fluid and air. There is thickening of  the wall of the gastric antrum/pyloric region which is likely normal  for this patient. A neoplastic process in this region is considered  less likely, but difficult to exclude.      Impression    IMPRESSION:  1. Large mass at the caudate lobe of the liver surrounding the portal  vein and there is an associated mass in the inferior right lobe of the  liver and significant necrotic lymphadenopathy in the retroperitoneum.  This is most consistent with malignancy. This could represent primary  hepatic origin, but could also represent metastasis although no other  primary lesions are seen.  2. Mild prominence of the wall of the gastric antrum/pylorus likely  represents a normal finding in this patient. Neoplastic infiltration  is considered less likely.    SAMANTHA TEJADA MD     US liver  HISTORY:    Right upper quadrant pain. Postprandial abdominal pain.     FINDINGS:    The liver measures a craniocaudal dimension of 18.9 cm.  There is a hypoechoic mass in the inferior right lobe of the liver  with internal blood flow measuring approximately 9.2 x 8.3 x 5.7 cm.  There is an irregular hypoechoic mass posterior to the left lobe of  the liver, of uncertain origin, possibly pancreatic head or caudate  lobe, measuring 4.5 x 7.2 x 6.5 cm. Liver echogenicity is normal. The  spleen measures 15.6 cm. The gallbladder has been removed. The  diameter of the common bile duct measures 4mm. The  pancreas is  partially obscured but otherwise appears unremarkable. The aorta and  IVC are visualized. The right and left kidneys measure 11.8cm and 12.9  cm , respectively. No solid renal mass, stone or hydronephrosis.                                                                      IMPRESSION:      1. Suspicious large mass in the upper abdomen and large liver mass,  concerning for metastatic malignancy. Recommend CT of the abdomen and  pelvis with contrast for further evaluation.  2. Cholecystectomy.         EVY GIRON MD       ASSESSMENT  AND RECOMMENDATIONS:  1.  2 large liver lesions with associated retroperitoneal adenopathy.  This appears to be a malignancy however it is questionable as to the primary source.  Options could include a primary hepatocellular carcinoma although this seems less likely, bile duct, carcinoma metastatic gastric cancer, metastatic colorectal cancer versus other.  2.  Postprandial pain    Plan    I discussed with the patient regarding her scans and reviewed these results.  She would appear to have a malignancy based on the 2 large lesions in the liver with associated retroperitoneal adenopathy.  I think it is unlikely to be a primary hepatocellular carcinoma given the radiographic picture of this and her history.  And at this point it may seem most likely that this is a metastatic gastric cancer given her upper GI symptoms began this would be somewhat unlikely.  At this point she needs additional studies to be done including a CT scan of her chest I will send her for an EGD and have interventional radiology review her films to see if we can get some tissue.  Additionally her albumin is slightly low with an elevated total protein and eventually we will have her check a serum protein electrophoresis.  This was discussed with her she will have the above studies and see me back for the results.    Laurie Fraser MD on 7/17/2020 at 2:40 PM

## 2020-07-17 NOTE — NURSING NOTE
SYMPTOM QUESTIONNAIRE    Pain: 5/10 at abdomen- worse with eating any laying down    Nausea/Vomiting: nausea- worse when laying down-- takes zofran    Mouth Sores: no    Shortness of Breath: no    Smoking: no    Fever or Chills: no    Hard Stools: no    Soft Stools: no    Weight Loss: yes- about 40 lbs in the past 6 months    Weakness: dizziness and fatigue    Burning, numbness or tingling in hands or feet: no    Problems with skin or swelling: dry skin    Memory Loss: no    Anxiety or Depression: both, on RX, is followed by MD    Trouble Sleeping: yes due to nausea and pain    Luna Martinez LPN on 7/17/2020 at 1:35 PM

## 2020-07-17 NOTE — LETTER
7/17/2020         RE: Joesph Zeng  [address]  [facility] [address]        Dear Colleague,    Thank you for referring your patient, Joesph Zeng, to the [facility]. Please see a copy of my visit note below.    [facility]  MEDICAL ONCOLOGY    NEW PATIENT VISIT NOTE    Reason for consultation: Liver mass     Referring Provider: Mitra Delgadillo MD     HISTORY OF PRESENTING ILLNESS  Patient is a very pleasant 49-year-old female who is seen today for a telephone visit.  She relates that she began having symptoms last October 2019 when she had problems feeling bloated and constipated.  She had nausea associated with this off and on and it continued until mid December when suddenly 1 day she noted pressure in her upper abdomen.  This was rather severe but eventually passed.  She then continued in her normal state until March when she began noting polyuria and polydipsia.  At that time she went and saw her primary care provider to have diabetes test done which did not have particular the reveal anything untoward.  She eventually had some 30+ pound weight loss along with her GI symptoms and has noted a lot of postprandial pain anticipatory pain with eating if she eats less than a cup at a time because of the pain associated with it.  She was eventually sent for abdominal ultrasound on 7/10/2020 that showed a large mass in the upper abdomen along with a large liver mass.  This then prompted a CT of the abdomen pelvis performed on 7/13/2020.    CT demonstrated a large hypoattenuating mass in the inferior right lobe of the liver (segment 6) measuring up to 6.5 cm.  Additionally there was a mass in the caudate lobe of the liver which appear to surround the portal vein.  This measured up to 6 cm.  There was a large hypoattenuating necrotic lymph node in the aortocaval region measuring 3.5 cm.  Other retroperitoneal lymphadenopathy was seen posterior to the left renal vein  measuring up to 2.6 cm.  No other abnormalities were noted at that time.    She was thus referred to medical oncology and has not had any further work-up today.         PAST MEDICAL HISTORY  Depression, PTSD, cholecystectomy      CURRENT OUTPATIENT MEDICATIONS  Current Outpatient Medications   Medication     diazepam (VALIUM) 5 MG tablet     docusate sodium (COLACE) 100 MG capsule     FLUoxetine 20 MG tablet     mirtazapine (REMERON) 15 MG tablet     omeprazole (PRILOSEC) 40 MG DR capsule     ondansetron (ZOFRAN-ODT) 4 MG ODT tab     polyethylene glycol (MIRALAX) powder     risperiDONE (RISPERDAL) 1 MG tablet     No current facility-administered medications for this visit.         ALLERGIES   No Known Allergies     SOCIAL HISTORY  No tobacco use, she is single and has a fiancé.  She lives in [place].  She has 7 children.  She works at home.  No tobacco or excessive alcohol use.     FAMILY HISTORY  No malignancies in her family history that she is aware of     REVIEW OF SYSTEMS  Review Of Systems  Skin: negative  Eyes: negative  Ears/Nose/Throat: negative  Respiratory: No shortness of breath, dyspnea on exertion, cough, or hemoptysis  Cardiovascular: negative  Gastrointestinal: negative per HPI  Genitourinary: negative  Musculoskeletal: negative  Neurologic: negative  Psychiatric: negative  Hematologic/Lymphatic/Immunologic: negative  Endocrine: negative      PHYSICAL EXAM  B/P: Data Unavailable, T: Data Unavailable, P: Data Unavailable, R: Data Unavailable  Wt Readings from Last 3 Encounters:   07/02/20 113.5 kg (250 lb 3.2 oz)   03/05/20 121.3 kg (267 lb 6.4 oz)   09/05/14 120.4 kg (265 lb 6 oz)         GEN: NAD  She is conversational with a normal affect.  She is obviously concerned about what is going on.          LABORATORY AND IMAGING STUDIES  Recent Labs   Lab Test 07/02/20  1402 03/05/20  1102    136   POTASSIUM 3.9 3.9   CHLORIDE 102 103   CO2 29 26   ANIONGAP 7 7   BUN 10 15   CR 0.80 0.63   GLC  97 126*   IMELDA 9.0 8.8     No results for input(s): MAG, PHOS in the last 33834 hours.  Recent Labs   Lab Test 07/02/20  1402 03/05/20  1102   WBC 5.9 6.1   HGB 15.0 15.2   * 194   MCV 90 90   NEUTROPHIL 61.4 59.4     Recent Labs   Lab Test 07/02/20  1402 03/05/20  1102   BILITOTAL 0.7 0.8   ALKPHOS 110 114   ALT 43 89*   AST 45 90*   ALBUMIN 3.3* 3.3*     TSH   Date Value Ref Range Status   03/05/2020 3.20 0.40 - 4.00 mU/L Final     No results for input(s): CEA in the last 65086 hours.  Results for orders placed or performed during the hospital encounter of 07/13/20   CT Abdomen Pelvis w Contrast    Narrative    CT ABDOMEN PELVIS WITH CONTRAST  7/13/2020 3:57 PM    HISTORY: Large abdominal mass seen on ultrasound 7/10/20, concern is  for metastasis. Abdominal pain, unspecified abdominal location.    TECHNIQUE: Scans obtained from the diaphragm through the pelvis with  IV contrast, Isovue 370, 100mL.   Radiation dose for this scan was reduced using automated exposure  control, adjustment of the mA and/or kV according to patient size, or  iterative reconstruction technique.    COMPARISON:  Limited abdominal ultrasound dated 7/10/2020.    FINDINGS: Visualized portions of the lung bases and mediastinal  contents are grossly unremarkable. No aggressive osseous lesions or  acute osseous fractures are seen. There are degenerative changes in  the spine. Sclerosis at the left SI joint could represent chronic  sacral ileitis versus degenerative change.    There is a large hypoattenuating mass in the inferior right lobe of  the liver (segment 6) measuring up to 6.5 cm in diameter. Additionally  there is a mass in the caudate lobe of the liver which appears to  surround the portal vein. This measures up to 6.0 cm in diameter.  There is a large hypoattenuating necrotic lymph node in the aortocaval  region (image 33 series 2) measuring up to 3.5 cm in short axis. Other  retroperitoneal lymphadenopathy seen posterior to the  left renal vein  measuring up to 2.6 cm in short axis. The liver, pancreas, spleen,  bilateral adrenal glands, and bilateral kidneys otherwise enhance  normally. No hydronephrosis, nephrolithiasis, hydroureter or ureteral  calculus is identified. Urinary bladder is mostly decompressed but is  otherwise unremarkable.    No other significant lymphadenopathy is seen in the abdomen or pelvis.  No free fluid or free air is seen in the peritoneal cavity. Uterus and  ovaries are grossly unremarkable.    The colon is of normal caliber without pericolonic inflammatory change  to suggest acute diverticulitis. Appendix is not well seen. No obvious  pericecal inflammatory changes are seen. Small bowel is grossly of  normal caliber. Stomach contains fluid and air. There is thickening of  the wall of the gastric antrum/pyloric region which is likely normal  for this patient. A neoplastic process in this region is considered  less likely, but difficult to exclude.      Impression    IMPRESSION:  1. Large mass at the caudate lobe of the liver surrounding the portal  vein and there is an associated mass in the inferior right lobe of the  liver and significant necrotic lymphadenopathy in the retroperitoneum.  This is most consistent with malignancy. This could represent primary  hepatic origin, but could also represent metastasis although no other  primary lesions are seen.  2. Mild prominence of the wall of the gastric antrum/pylorus likely  represents a normal finding in this patient. Neoplastic infiltration  is considered less likely.    SAMANTHA TEJADA MD     US liver  HISTORY:    Right upper quadrant pain. Postprandial abdominal pain.     FINDINGS:    The liver measures a craniocaudal dimension of 18.9 cm.  There is a hypoechoic mass in the inferior right lobe of the liver  with internal blood flow measuring approximately 9.2 x 8.3 x 5.7 cm.  There is an irregular hypoechoic mass posterior to the left lobe of  the liver, of  uncertain origin, possibly pancreatic head or caudate  lobe, measuring 4.5 x 7.2 x 6.5 cm. Liver echogenicity is normal. The  spleen measures 15.6 cm. The gallbladder has been removed. The  diameter of the common bile duct measures 4mm. The pancreas is  partially obscured but otherwise appears unremarkable. The aorta and  IVC are visualized. The right and left kidneys measure 11.8cm and 12.9  cm , respectively. No solid renal mass, stone or hydronephrosis.                                                                      IMPRESSION:      1. Suspicious large mass in the upper abdomen and large liver mass,  concerning for metastatic malignancy. Recommend CT of the abdomen and  pelvis with contrast for further evaluation.  2. Cholecystectomy.         EVY GIRON MD       ASSESSMENT  AND RECOMMENDATIONS:  1.  2 large liver lesions with associated retroperitoneal adenopathy.  This appears to be a malignancy however it is questionable as to the primary source.  Options could include a primary hepatocellular carcinoma although this seems less likely, bile duct, carcinoma metastatic gastric cancer, metastatic colorectal cancer versus other.  2.  Postprandial pain    Plan    I discussed with the patient regarding her scans and reviewed these results.  She would appear to have a malignancy based on the 2 large lesions in the liver with associated retroperitoneal adenopathy.  I think it is unlikely to be a primary hepatocellular carcinoma given the radiographic picture of this and her history.  And at this point it may seem most likely that this is a metastatic gastric cancer given her upper GI symptoms began this would be somewhat unlikely.  At this point she needs additional studies to be done including a CT scan of her chest I will send her for an EGD and have interventional radiology review her films to see if we can get some tissue.  Additionally her albumin is slightly low with an elevated total protein and  "eventually we will have her check a serum protein electrophoresis.  This was discussed with her she will have the above studies and see me back for the results.    Laurie Fraser MD on 7/17/2020 at 2:40 PM            Joesph Zeng is a 49 year old female who is being evaluated via a billable telephone visit.      The patient has been notified of following:     \"This telephone visit will be conducted via a call between you and your physician/provider. We have found that certain health care needs can be provided without the need for a physical exam.  This service lets us provide the care you need with a short phone conversation.  If a prescription is necessary we can send it directly to your pharmacy.  If lab work is needed we can place an order for that and you can then stop by our lab to have the test done at a later time.    Telephone visits are billed at different rates depending on your insurance coverage. During this emergency period, for some insurers they may be billed the same as an in-person visit.  Please reach out to your insurance provider with any questions.    If during the course of the call the physician/provider feels a telephone visit is not appropriate, you will not be charged for this service.\"    Patient has given verbal consent for Telephone visit?  Yes    What phone number would you like to be contacted at? [phone]    How would you like to obtain your AVS? Mail a copy              Again, thank you for allowing me to participate in the care of your patient.        Sincerely,        Laurie Fraser MD    "

## 2020-07-17 NOTE — PROGRESS NOTES
"Joesph Zeng is a 49 year old female who is being evaluated via a billable telephone visit.      The patient has been notified of following:     \"This telephone visit will be conducted via a call between you and your physician/provider. We have found that certain health care needs can be provided without the need for a physical exam.  This service lets us provide the care you need with a short phone conversation.  If a prescription is necessary we can send it directly to your pharmacy.  If lab work is needed we can place an order for that and you can then stop by our lab to have the test done at a later time.    Telephone visits are billed at different rates depending on your insurance coverage. During this emergency period, for some insurers they may be billed the same as an in-person visit.  Please reach out to your insurance provider with any questions.    If during the course of the call the physician/provider feels a telephone visit is not appropriate, you will not be charged for this service.\"    Patient has given verbal consent for Telephone visit?  Yes    What phone number would you like to be contacted at? [phone]    How would you like to obtain your AVS? Mail a copy            "

## 2020-07-20 NOTE — TELEPHONE ENCOUNTER
DIAGNOSIS: liver biopsy    DATE RECEIVED: 7.21.20   NOTES STATUS DETAILS   OFFICE NOTE from referring provider Internal 7.17.20  Dr. Laurie Fraser  [facility] Heme/Onc [place]   OFFICE NOTE from other specialist Internal 7.2.20  Daphnie Leal PA-C   [facility]    3.5.20  CRISTINE Ledesma CNP  [facility]   DISCHARGE SUMMARY from hospital N/A    DISCHARGE REPORT from the ER N/A    OPERATIVE REPORT N/A    MEDICATION LIST Internal    XRAYS (IMAGES & REPORTS) Pacs 7.13.20  CT Abd/Pelvis    7.10.20  US Abd    3.5.20  XR Abd   PATHOLOGY  Slides & report N/A

## 2020-07-21 NOTE — LETTER
7/21/2020       RE: Joesph Zeng  [address]  [facility] [address]     Dear Colleague,    Thank you for referring your patient, Joesph Zeng, to the [facility] INTERVENTIONAL RADIOLOGY at [facility]. Please see a copy of my visit note below.    Joesph is a 48 yo female who is being evaluated via a billable telephone visit.         Call started at  11:00 AM  Call ended at  11:30 AM  ___________________________________________  Interventional Radiology Consult    First Name: Joesph   Age: 49 year old   Referring Physician: Dr. Fraser   REASON FOR REFERRAL: Consult for liver lesion biopsy    Assessment:  Joesph is a 48 yo who has been found to have two large livers associated with retroperitoneal adenopathy.She is a hepatitis B carrier.  Biopsy is requested to evaluate for malignancy.    Plan:  Image guided biopsy of liver lesion, in right lobe  Will need an INR on the day of the biopsy    HPI: This is a patient who has been experiencing GI symptoms since October 2019 of feeling bloated and constipated.  She has also had issues with nausea and pressure in her abdomen.  In March she noted polyuria and polydipsia.  She went to her PCP which did not reveal anything.  She has lost about 30 pounds and can only eat about a  at a time of else she has pain with eating.  She had an abdominal ultrasound 7/1020 that showed a large mass in the upper abdomen along with a large liver mass.  CT abdomen/pelvis 7/13/20 revealed:  a large hypoattenuating mass in the inferior right lobe of the liver (segment 6) measuring up to 6.5 cm.  Additionally there was a mass in the caudate lobe of the liver which appear to surround the portal vein.  This measured up to 6 cm.  There was a large hypoattenuating necrotic lymph node in the aortocaval region measuring 3.5 cm.  Other retroperitoneal lymphadenopathy was seen posterior to the left renal vein measuring up to 2.6 cm.  No other abnormalities were noted  at that time.  When reviewing Care Everywhere, Agustina, found that on her problem list she is a hepatitis B carrier.  This was found on blood drawn in November 2007.   She was referred to medical oncology for further evaluation. Dr. Fraser has requested a biopsy of one of the liver lesions.  Dr. Trammell from Interventional Radiology reviewed the imaging and approved the biopsy        PAST MEDICAL HISTORY:   Past Medical History:   Diagnosis Date     Allergic rhinitis      PAST SURGICAL HISTORY:   Past Surgical History:   Procedure Laterality Date     CHOLECYSTECTOMY  2001     FAMILY HISTORY:   Family History   Problem Relation Age of Onset     Diabetes Mother      Hypertension Mother      Suicide Father 35     SOCIAL HISTORY:   Social History     Tobacco Use     Smoking status: Never Smoker     Smokeless tobacco: Never Used   Substance Use Topics     Alcohol use: No     PROBLEM LIST:   Patient Active Problem List    Diagnosis Date Noted     Elevated LFTs 03/16/2020     Priority: Medium     Morbid obesity (H) 03/05/2020     Priority: Medium     Diabetes mellitus, type 2 (H) 03/05/2020     Priority: Medium     Urinary incontinence, unspecified type 03/05/2020     Priority: Medium     Hyperglycemia 03/05/2020     Priority: Medium     Constipation, unspecified constipation type  03/05/2020     Priority: Medium     Family history of diabetes mellitus 03/05/2020     Priority: Medium     Panic disorder with agoraphobia 10/30/2014     Priority: Medium     PTSD (post-traumatic stress disorder) 10/30/2014     Priority: Medium     Seasonal affective disorder (H) 01/12/2012     Priority: Medium     Severe recurrent major depressive disorder with psychotic features (H) 08/27/2010     Priority: Medium     Epic  Epic       Vitamin D deficiency 06/16/2010     Priority: Medium     Hepatitis B carrier (H) 11/12/2007     Priority: Medium     MEDICATIONS:   Prescription Medications as of 7/22/2020       Rx Number Disp Refills Start End  Last Dispensed Date Next Fill Date Owning Pharmacy    diazepam (VALIUM) 5 MG tablet    8/15/2019    [facility]/pharmacy [address] [place] [address], [address]    Sig: Take 5 mg by mouth    Class: Historical    Route: Oral    docusate sodium (COLACE) 100 MG capsule  60 capsule 1 3/5/2020    [facility]/pharmacy [address] [place] [address], [address]    Sig: Take 1 capsule (100 mg) by mouth 2 times daily    Class: E-Prescribe    Route: Oral    FLUoxetine 20 MG tablet   5 2014        Si mg 2 times daily    Class: Historical    mirtazapine (REMERON) 15 MG tablet    8/15/2019    [facility]/pharmacy [address] [place] [address], [address]    Sig: Take 15 mg by mouth    Class: Historical    Route: Oral    omeprazole (PRILOSEC) 40 MG DR capsule  30 capsule 0 2020   [facility]/pharmacy [address] [place] [address], [address]    Sig: Take 1 capsule (40 mg) by mouth daily    Class: E-Prescribe    Route: Oral    ondansetron (ZOFRAN-ODT) 4 MG ODT tab  20 tablet 0 2020    [facility]/pharmacy #[address] [place] [address], [address]    Sig: Take 1 tablet (4 mg) by mouth every 8 hours as needed for nausea or vomiting    Class: E-Prescribe    Route: Oral    polyethylene glycol (MIRALAX) powder  500 g 0 3/5/2020    [facility]/pharmacy #[address] [place] [address], [address]    Sig: Take 17 g (1 capful) by mouth daily    Class: E-Prescribe    Route: Oral    risperiDONE (RISPERDAL) 1 MG tablet   5 2014        Si mg once    Class: Historical        ALLERGIES: Patient has no known allergies.  VITALS: There were no vitals taken for this visit.    ROS:  Skin: negative  Eyes: negative  Ears/Nose/Throat: negative  Respiratory: No shortness of breath, dyspnea on exertion, cough, or hemoptysis  Cardiovascular: negative  Gastrointestinal: per HPI  Genitourinary: negative  Musculoskeletal: negative  Neurologic: negative  Psychiatric: depression  Hematologic/Lymphatic/Immunologic:  negative  Endocrine: negative    Physical Examination: virtual visit  Pleasant, oriented    BMP RESULTS:  Lab Results   Component Value Date     07/02/2020    POTASSIUM 3.9 07/02/2020    CHLORIDE 102 07/02/2020    CO2 29 07/02/2020    ANIONGAP 7 07/02/2020    GLC 97 07/02/2020    BUN 10 07/02/2020    CR 0.80 07/02/2020    GFRESTIMATED 87 07/02/2020    GFRESTBLACK >90 07/02/2020    IMELDA 9.0 07/02/2020        CBC RESULTS:  Lab Results   Component Value Date    WBC 5.9 07/02/2020    RBC 5.10 07/02/2020    HGB 15.0 07/02/2020    HCT 46.1 07/02/2020    MCV 90 07/02/2020    MCH 29.4 07/02/2020    MCHC 32.5 07/02/2020    RDW 12.7 07/02/2020     (L) 07/02/2020       INR/PTT:  No results found for: INR, PTT    Diagnostic studies: CT scan    PROVIDER NOTE:  I explained the procedure to Joesph.  This included:  Preparing for the procedure, the actual procedure and recovery.  I explained the risks of the liver biopsy:  Bleeding, infection, and pain related to the procedure.  I explained that usually the results return after three to four business days.    I explained that he/she would be contacted by someone from Dr. Fraser's office following this to determine a future plan.  Thank you for involving us in the care of your patient.    Yuridia Garcia MS, APRN, CNS, CRN  Clinical Nurse Specialist  Interventional Radiology  [phone] (voice mail)  [phone] (pager)    CC  Patient Care Team:  No Ref-Primary, Physician as PCP - General  Mitra Delgadillo APRN CNP as Assigned PCP  Rahcael Fraser MD as MD (Internal Medicine-Hematology & Oncology)  Mitra Delgadillo APRN CNP as Referring Physician (Nurse Practitioner - Family)  RACHAEL FRASER

## 2020-07-22 NOTE — PATIENT INSTRUCTIONS
You are scheduled for your biopsy on Friday, July 31, 2020  Report to the [facility] Waiting room at 11:00 AM  The [facility] Waiting Room is located on the 2nd floor (street level) of [facility], [address] [place] [address], [facility]  Your procedure is scheduled to start at approximately 12:30 PM    No solid foods or milk products for 6 hours prior on the day of the procedure 6:30 AM  You may have clear liquids until 2 hours prior on the day of the procedure.(water, apple juice, broth, coffee or tea without milk or sugar, jell-o, white grape juice)  10>30 AM    You will need a responsible adult  on the day of the procedure  You may bring one person into the hospital with you on the day of the procedure  Remember to wear your mask into the hospital    If you have any questions you may call the Radiology Nurse Line [phone]    If you come alone into the hospital during Covid 19-  to reach your family member after they are dropped off for an Interventional Radiology appt: Please call the Pre-Procedure location 2A with any questions  at this number: [phone]    Yuridia Garcia MS, APRN, CNS, CRN  Clinical Nurse Specialist  Interventional Radiology  [phone] (voice mail)  [phone] (pager)

## 2020-07-22 NOTE — PROGRESS NOTES
Joesph is a 48 yo female who is being evaluated via a billable telephone visit.       Please call patient on Home phone.      The patient has been notified of following:      This telephone visit will be conducted via a call between you and your physician/provider. We have found that certain health care needs can be provided without the need for a physical exam.  This service lets us provide the care you need with a short phone conversation.  If a prescription is necessary we can send it directly to your pharmacy.  If lab work is needed we can place an order for that and you can then stop by our lab to have the test done at a later time.     Telephone visits are billed at different rates depending on your insurance coverage. During this emergency period, for some insurers they may be billed the same as an in-person visit.  Please reach out to your insurance provider with any questions.     If during the course of the call the physician/provider feels a telephone visit is not appropriate, you will not be charged for this service.     Physician has received verbal consent for a Telephone Visit from the patient? Yes     How would you like to obtain your AVS?  E-mail  [email or web address]    Call started at  11:00 AM  Call ended at  11:30 AM  ___________________________________________  Interventional Radiology Consult    First Name: Joesph   Age: 49 year old   Referring Physician: Dr. Fraser   REASON FOR REFERRAL: Consult for liver lesion biopsy    Assessment:  Joesph is a 48 yo who has been found to have two large livers associated with retroperitoneal adenopathy.She is a hepatitis B carrier.  Biopsy is requested to evaluate for malignancy.    Plan:  Image guided biopsy of liver lesion, in right lobe  Will need an INR on the day of the biopsy    HPI: This is a patient who has been experiencing GI symptoms since October 2019 of feeling bloated and constipated.  She has also had issues with nausea and pressure in her abdomen.   In March she noted polyuria and polydipsia.  She went to her PCP which did not reveal anything.  She has lost about 30 pounds and can only eat about a  at a time of else she has pain with eating.  She had an abdominal ultrasound 7/1020 that showed a large mass in the upper abdomen along with a large liver mass.  CT abdomen/pelvis 7/13/20 revealed:  a large hypoattenuating mass in the inferior right lobe of the liver (segment 6) measuring up to 6.5 cm.  Additionally there was a mass in the caudate lobe of the liver which appear to surround the portal vein.  This measured up to 6 cm.  There was a large hypoattenuating necrotic lymph node in the aortocaval region measuring 3.5 cm.  Other retroperitoneal lymphadenopathy was seen posterior to the left renal vein measuring up to 2.6 cm.  No other abnormalities were noted at that time.  When reviewing Care Everywhere, Agustina, found that on her problem list she is a hepatitis B carrier.  This was found on blood drawn in November 2007.   She was referred to medical oncology for further evaluation. Dr. Fraser has requested a biopsy of one of the liver lesions.  Dr. Trammell from Interventional Radiology reviewed the imaging and approved the biopsy        PAST MEDICAL HISTORY:   Past Medical History:   Diagnosis Date     Allergic rhinitis      PAST SURGICAL HISTORY:   Past Surgical History:   Procedure Laterality Date     CHOLECYSTECTOMY  2001     FAMILY HISTORY:   Family History   Problem Relation Age of Onset     Diabetes Mother      Hypertension Mother      Suicide Father 35     SOCIAL HISTORY:   Social History     Tobacco Use     Smoking status: Never Smoker     Smokeless tobacco: Never Used   Substance Use Topics     Alcohol use: No     PROBLEM LIST:   Patient Active Problem List    Diagnosis Date Noted     Elevated LFTs 03/16/2020     Priority: Medium     Morbid obesity (H) 03/05/2020     Priority: Medium     Diabetes mellitus, type 2 (H) 03/05/2020     Priority:  Medium     Urinary incontinence, unspecified type 2020     Priority: Medium     Hyperglycemia 2020     Priority: Medium     Constipation, unspecified constipation type  2020     Priority: Medium     Family history of diabetes mellitus 2020     Priority: Medium     Panic disorder with agoraphobia 10/30/2014     Priority: Medium     PTSD (post-traumatic stress disorder) 10/30/2014     Priority: Medium     Seasonal affective disorder (H) 2012     Priority: Medium     Severe recurrent major depressive disorder with psychotic features (H) 2010     Priority: Medium     Epic  Epic       Vitamin D deficiency 2010     Priority: Medium     Hepatitis B carrier (H) 2007     Priority: Medium     MEDICATIONS:   Prescription Medications as of 2020       Rx Number Disp Refills Start End Last Dispensed Date Next Fill Date Owning Pharmacy    diazepam (VALIUM) 5 MG tablet    8/15/2019    [facility]/pharmacy [address] [place] [address], [place] [facility] [address] [facility].    Sig: Take 5 mg by mouth    Class: Historical    Route: Oral    docusate sodium (COLACE) 100 MG capsule  60 capsule 1 3/5/2020    [facility]/pharmacy [address] [place] [address], [place] - [address] [facility].    Sig: Take 1 capsule (100 mg) by mouth 2 times daily    Class: E-Prescribe    Route: Oral    FLUoxetine 20 MG tablet   5 2014        Si mg 2 times daily    Class: Historical    mirtazapine (REMERON) 15 MG tablet    8/15/2019    [facility]pharmacy [address] [place] [address], [place] - [address] [facility].    Sig: Take 15 mg by mouth    Class: Historical    Route: Oral    omeprazole (PRILOSEC) 40 MG DR capsule  30 capsule 0 2020   [facility]pharm[facility] [place] [address], [place] [facility] [address] [facility]    Sig: Take 1 capsule (40 mg) by mouth daily    Class: E-Prescribe    Route: Oral    ondansetron (ZOFRAN-ODT) 4 MG ODT tab  20 tablet 0 2020    [facility]pharm[facility] [address] [place] [address], [place] [facility] [address] [facility]    Sig: Take 1 tablet (4 mg) by mouth  every 8 hours as needed for nausea or vomiting    Class: E-Prescribe    Route: Oral    polyethylene glycol (MIRALAX) powder  500 g 0 3/5/2020    [facility]/pharmacy #[id number] - [facility], [place] - [address] [facility].    Sig: Take 17 g (1 capful) by mouth daily    Class: E-Prescribe    Route: Oral    risperiDONE (RISPERDAL) 1 MG tablet   5 2014        Si mg once    Class: Historical        ALLERGIES: Patient has no known allergies.  VITALS: There were no vitals taken for this visit.    ROS:  Skin: negative  Eyes: negative  Ears/Nose/Throat: negative  Respiratory: No shortness of breath, dyspnea on exertion, cough, or hemoptysis  Cardiovascular: negative  Gastrointestinal: per HPI  Genitourinary: negative  Musculoskeletal: negative  Neurologic: negative  Psychiatric: depression  Hematologic/Lymphatic/Immunologic: negative  Endocrine: negative    Physical Examination: virtual visit  Pleasant, oriented    BMP RESULTS:  Lab Results   Component Value Date     2020    POTASSIUM 3.9 2020    CHLORIDE 102 2020    CO2 29 2020    ANIONGAP 7 2020    GLC 97 2020    BUN 10 2020    CR 0.80 2020    GFRESTIMATED 87 2020    GFRESTBLACK >90 2020    IMELDA 9.0 2020        CBC RESULTS:  Lab Results   Component Value Date    WBC 5.9 2020    RBC 5.10 2020    HGB 15.0 2020    HCT 46.1 2020    MCV 90 2020    MCH 29.4 2020    MCHC 32.5 2020    RDW 12.7 2020     (L) 2020       INR/PTT:  No results found for: INR, PTT    Diagnostic studies: CT scan    PROVIDER NOTE:  I explained the procedure to Joesph.  This included:  Preparing for the procedure, the actual procedure and recovery.  I explained the risks of the liver biopsy:  Bleeding, infection, and pain related to the procedure.  I explained that usually the results return after three to four business days.    I explained that he/she would be contacted by someone  from Dr. Fraser's office following this to determine a future plan.  Thank you for involving us in the care of your patient.    Yuridia Jose MS, APRN, CNS, CRN  Clinical Nurse Specialist  Interventional Radiology  [phone] (voice mail)  [phone] (pager)    CC  Patient Care Team:  No Ref-Primary, Physician as PCP - General  Mitra Delgadillo APRN CNP as Assigned PCP  Rachael Fraser MD as MD (Internal Medicine-Hematology & Oncology)  Mitra Delgadillo APRN CNP as Referring Physician (Nurse Practitioner - Family)  RACHAEL FRASER

## 2020-07-28 NOTE — TELEPHONE ENCOUNTER
Patient scheduled for EGD    Indication for procedure. Liver mass [R16.0]    Referring Provider. Laurie Fraser MD, Mitra Delgadillo APRN CNP    ? No     Arrival time verified? 1:30 PM    Facility location verified? [address] [place] [address]    Instructions given regarding prep and procedure Instructions reviewed    Prep Type NPO    Are you taking any anticoagulants or blood thinners? No     Instructions given? N/a     Electronic implanted devices? Denies     Pre procedure teaching completed? Yes    Transportation from procedure?  policy reviewed. Instructed patient to have someone stay with her for 24 hours post exam    H&P / Pre op physical completed? N/a

## 2020-07-28 NOTE — PROGRESS NOTES
[address] [address]  [phone]  Dept: [phone]    PRE-OP EVALUATION:  Today's date: 2020    Joesph Zeng (: 1970) presents for pre-operative evaluation assessment as requested by Dr. Angela Vuong.  She requires evaluation and anesthesia risk assessment prior to undergoing surgery/procedure for treatment of EGD    Proposed Surgery/ Procedure: EGD  Date of Surgery/ Procedure: 8/3/2020  Time of Surgery/ Procedure: 2:30 pm  Hospital/Surgical Facility: [facility]  Surgery Fax Number: Note does not need to be faxed, will be available electronically in Epic.  Primary Physician: No Ref-Primary, Physician  Type of Anesthesia Anticipated: General and to be determined    Preoperative Questionnaire:   No - Have you ever had a heart attack or stroke?  No - Have you ever had surgery on your heart or blood vessels, such as a stent, coronary (heart) bypass, or surgery on an artery in the head, neck, heart, or legs?  No - Do you have chest pain when you are physically active?  No - Do you have a history of heart failure?  No - Do you currently have a cold, bronchitis, or symptoms of other respiratory (head and chest) infections?  No - Do you have a cough, shortness of breath, or wheezing?  No - Do you or anyone in your family have a history of blood clots?  No - Do you or anyone in your family have a serious bleeding problem, such as long-lasting bleeding after surgeries or cuts?  No - Have you ever had anemia or been told to take iron pills?  No - Have you had any abnormal blood loss such as black, tarry or bloody stools, or abnormal vaginal bleeding?  No - Have you ever had a blood transfusion?  Yes - Are you willing to have a blood transfusion if it is medically needed before, during, or after your surgery?  No - Have you or anyone in your family ever had problems with anesthesia (sedation for surgery)?  No - Do you have sleep apnea, excessive  snoring, or daytime drowsiness?   No - Do you have any artifical heart valves or other implanted medical devices, such as a pacemaker, defibrillator, or continuous glucose monitor?  No - Do you have any artifical joints?  No - Are you allergic to latex?  No - Is there any chance that you may be pregnant?    Patient has a Health Care Directive or Living Will:  NO    HPI:     HPI related to upcoming procedure: Had ongoing abdominal pain for sometime and as a part of work up was found to have Liver mass. Now having EGD for a biopsy of stomach lesion       DEPRESSION - Patient has a long history of Depression of moderate severity requiring medication for control with recent symptoms being gradually worsening..Current symptoms of depression include depressed mood.       MEDICAL HISTORY:     Patient Active Problem List    Diagnosis Date Noted     Elevated LFTs 03/16/2020     Priority: Medium     Morbid obesity (H) 03/05/2020     Priority: Medium     Diabetes mellitus, type 2 (H) 03/05/2020     Priority: Medium     Urinary incontinence, unspecified type 03/05/2020     Priority: Medium     Hyperglycemia 03/05/2020     Priority: Medium     Constipation, unspecified constipation type  03/05/2020     Priority: Medium     Family history of diabetes mellitus 03/05/2020     Priority: Medium     Panic disorder with agoraphobia 10/30/2014     Priority: Medium     PTSD (post-traumatic stress disorder) 10/30/2014     Priority: Medium     Seasonal affective disorder (H) 01/12/2012     Priority: Medium     Severe recurrent major depressive disorder with psychotic features (H) 08/27/2010     Priority: Medium     Epic  Epic       Vitamin D deficiency 06/16/2010     Priority: Medium     Hepatitis B carrier (H) 11/12/2007     Priority: Medium      Past Medical History:   Diagnosis Date     Allergic rhinitis      Past Surgical History:   Procedure Laterality Date     CHOLECYSTECTOMY  2001     Current Outpatient Medications   Medication Sig  "Dispense Refill     diazepam (VALIUM) 5 MG tablet Take 5 mg by mouth       FLUoxetine 20 MG tablet 20 mg 2 times daily  5     mirtazapine (REMERON) 15 MG tablet Take 15 mg by mouth       omeprazole (PRILOSEC) 40 MG DR capsule Take 1 capsule (40 mg) by mouth daily 30 capsule 0     ondansetron (ZOFRAN-ODT) 4 MG ODT tab Take 1 tablet (4 mg) by mouth every 8 hours as needed for nausea or vomiting 20 tablet 0     risperiDONE (RISPERDAL) 1 MG tablet 1 mg once  5     docusate sodium (COLACE) 100 MG capsule Take 1 capsule (100 mg) by mouth 2 times daily (Patient not taking: Reported on 7/2/2020) 60 capsule 1     polyethylene glycol (MIRALAX) powder Take 17 g (1 capful) by mouth daily (Patient not taking: Reported on 7/2/2020) 500 g 0     OTC products: None, except as noted above    No Known Allergies   Latex Allergy: NO    Social History     Tobacco Use     Smoking status: Never Smoker     Smokeless tobacco: Never Used   Substance Use Topics     Alcohol use: No     History   Drug Use No       REVIEW OF SYSTEMS:   CONSTITUTIONAL: NEGATIVE for fever, chills, loosing weight   ENT/MOUTH: NEGATIVE for ear, mouth and throat problems  RESP: Having increasing SOB  CV: NEGATIVE for chest pain, palpitations or peripheral edema  Depressed mood  Constitutional, neuro, ENT, endocrine, pulmonary, cardiac, gastrointestinal, genitourinary, musculoskeletal, integument and psychiatric systems are negative, except as otherwise noted.    EXAM:   /84 (BP Location: Left arm, Patient Position: Sitting, Cuff Size: Adult Large)   Pulse 70   Temp 96.7  F (35.9  C) (Tympanic)   Resp 16   Ht 1.6 m (5' 3\")   Wt 111.1 kg (245 lb)   SpO2 98%   BMI 43.40 kg/m      GENERAL APPEARANCE: healthy, alert and no distress     EYES: EOMI, PERRL     HENT: ear canals and TM's normal and nose and mouth without ulcers or lesions     NECK: no adenopathy, no asymmetry, masses, or scars and thyroid normal to palpation     RESP: lungs clear to auscultation - " no rales, rhonchi or wheezes     CV: regular rates and rhythm, normal S1 S2, no S3 or S4 and no murmur, click or rub     ABDOMEN:  soft, nontender, no HSM or masses and bowel sounds normal     MS: extremities normal- no gross deformities noted, no evidence of inflammation in joints, FROM in all extremities.     SKIN: no suspicious lesions or rashes     NEURO: Normal strength and tone, sensory exam grossly normal, mentation intact and speech normal     PSYCH: mentation appears normal. and affect normal/bright     LYMPHATICS: No cervical adenopathy    DIAGNOSTICS:   No labs or EKG required for low risk surgery (cataract, skin procedure, breast biopsy, etc)    Recent Labs   Lab Test 07/02/20  1402 03/05/20  1102   HGB 15.0 15.2   * 194    136   POTASSIUM 3.9 3.9   CR 0.80 0.63   A1C  --  6.6*        IMPRESSION:   Reason for surgery/procedure: Liver mass and gastric lesion needing EGD and Biopsy  Diagnosis/reason for consult: Medical clearence    The proposed surgical procedure is considered LOW risk.    REVISED CARDIAC RISK INDEX  The patient has the following serious cardiovascular risks for perioperative complications such as (MI, PE, VFib and 3  AV Block):  No serious cardiac risks  INTERPRETATION: 0 risks: Class I (very low risk - 0.4% complication rate)    The patient has the following additional risks for perioperative complications:  No identified additional risks      ICD-10-CM    1. Preop general physical exam  Z01.818        RECOMMENDATIONS:         --Patient is to take all scheduled medications on the day of surgery EXCEPT for modifications listed below.    APPROVAL GIVEN to proceed with proposed procedure, without further diagnostic evaluation       Signed Electronically by: Marquis Chou MD    Copy of this evaluation report is provided to requesting physician.    [facility] Preop Guidelines    Revised Cardiac Risk Index

## 2020-07-28 NOTE — PATIENT INSTRUCTIONS
Before Your Surgery      Call your surgeon if there is any change in your health. This includes signs of a cold or flu (such as a sore throat, runny nose, cough, rash or fever).    Do not smoke, drink alcohol or take over the counter medicine (unless your surgeon or primary care doctor tells you to) for the 24 hours before and after surgery.    If you take prescribed drugs: Follow your doctor s orders about which medicines to take and which to stop until after surgery.    Eating and drinking prior to surgery: follow the instructions from your surgeon    Take a shower or bath the night before surgery. Use the soap your surgeon gave you to gently clean your skin. If you do not have soap from your surgeon, use your regular soap. Do not shave or scrub the surgery site.  Wear clean pajamas and have clean sheets on your bed.     At [facility], we strive to deliver an exceptional experience to you, every time we see you. If you receive a survey, please complete it as we do value your feedback.  If you have MyChart, you can expect to receive results automatically within 24 hours of their completion.  Your provider will send a note interpreting your results as well.   If you do not have MyChart, you should receive your results in about a week by mail.    Your care team:                            Family Medicine Internal Medicine   MD Matt Medina MD Shantel Branch-Fleming, MD Srinivasa Vaka, MD Katya Georgiev PA-C Megan Hill, APRN RAUDEL Montano MD Pediatrics   Ancelmo Christina, PA-C  Mitra Delgadillo, MD Lissa Nevarez APRN CNP   MD Katie Pace MD Deborah Mielke, MD Kim Thein, APRN CNP  Neisha Daly, PAVirginiaC  Kim Pearl, CNP  MD Leny Ludwig MD Angela Wermerskirchen, MD      Clinic hours: Monday - Thursday 7 am-7 pm; Fridays 7 am-5 pm.   Urgent care: Monday - Friday 11 am-9 pm;  Saturday and Sunday 9 am-5 pm.    Clinic: [phone]       [facility]: Monday - Thursday 8 am - 7 pm; Friday 8 am - 6 pm  [facility]: [phone]     Use [email or web address] for 24/7 diagnosis and treatment of dozens of conditions.

## 2020-07-31 NOTE — IP AVS SNAPSHOT
MRN:[id number]                      After Visit Summary   7/31/2020    Joesph Zeng    MRN: [id number]           Visit Information        Department      7/31/2020 10:54 AM [address] [facility]          Review of your medicines      UNREVIEWED medicines. Ask your doctor about these medicines       Dose / Directions   diazepam 5 MG tablet  Commonly known as:  VALIUM      Dose:  5 mg  Take 5 mg by mouth  Refills:  0     docusate sodium 100 MG capsule  Commonly known as:  COLACE  Used for:  Constipation, unspecified constipation type      Dose:  100 mg  Take 1 capsule (100 mg) by mouth 2 times daily  Quantity:  60 capsule  Refills:  1     FLUoxetine 20 MG tablet      Dose:  20 mg  20 mg 2 times daily  Refills:  5     mirtazapine 15 MG tablet  Commonly known as:  REMERON      Dose:  15 mg  Take 15 mg by mouth  Refills:  0     omeprazole 40 MG DR capsule  Commonly known as:  priLOSEC  Used for:  Abdominal pain, generalized, Nausea, Bloating, Constipation, unspecified constipation type, Gastroesophageal reflux disease with esophagitis      Dose:  40 mg  Take 1 capsule (40 mg) by mouth daily  Quantity:  30 capsule  Refills:  0     ondansetron 4 MG ODT tab  Commonly known as:  ZOFRAN-ODT  Used for:  Liver mass, Nausea      Dose:  4 mg  Take 1 tablet (4 mg) by mouth every 8 hours as needed for nausea or vomiting  Quantity:  20 tablet  Refills:  0     polyethylene glycol 17 GM/SCOOP powder  Commonly known as:  MIRALAX  Used for:  Constipation, unspecified constipation type      Dose:  1 capful  Take 17 g (1 capful) by mouth daily  Quantity:  500 g  Refills:  0     risperiDONE 1 MG tablet  Commonly known as:  risperDAL      Dose:  1 mg  1 mg once  Refills:  5              Protect others around you: Learn how to safely use, store and throw away your medicines at www.disposemymeds.org.       Follow-ups after your visit       Your next 10 appointments already scheduled    Aug 03, 2020  Procedure with Angela  MD Carolann  [facility] ([facility] [place]) [address]  5th Floor  [facility] [address] [phone]   Located in the Clinics and Surgery Center at [address] [place] [address]. We're taking every precaution to prevent the spread of COVID-19. Our top priority is to protect and care for our patients, community members, and our staff. For that reason, we are suspending  services until further notice. Please self-park your vehicle before entering our facility. For [facility] [place] please use the [address] at [address] [facility] [address], [place] [address].    Aug 07, 2020  9:30 AM CDT  Return Visit with Laurie Fraser MD  [facility] ([facility]) [address] [place] [address] [phone]         Care Instructions       Further instructions from your care team       [facility]    Interventional Radiology  Patient Instructions Following Biopsy    AFTER YOU GO HOME  ? If you were given sedation DO NOT drive or operate machinery at home or at work for at least 24 hours  ? DO relax and take it easy for 48 hours, no strenuous activity for 24 hours  ? DO drink plenty of fluids  ? DO resume your regular diet, unless otherwise instructed by your Primary Physician  ? Keep the dressing dry and in place for 24 hours.  ? DO NOT SMOKE FOR AT LEAST 24 HOURS, if you have been given any medications that were to help you relax or sedate you during your procedure  ? DO NOT drink alcoholic beverages the day of your procedure  ? DO NOT do any strenuous exercise or lifting (> 10 lbs) for at least 7 days following your procedure  ? DO NOT take a bath or shower for at least 12 hours following your procedure  ? Remove dressing after shower the next day. Replace with Band aid for 2 days.  Never leave a wet dressing in place.  ? DO NOT make any important or  "legal decisions for 24 hours following your procedure  ? There should be minimum drainage from the biopsy site    CALL THE PHYSICIAN IF:  ? You start bleeding from the procedure site.  If you do start to bleed from that site, lie down flat and hold pressure on the site for a minimum of 10 minutes.  Your physician will tell you if you need to return to the hospital  ? You develop nausea or vomiting  ? You have excessive swelling, redness, or tenderness at the site  ? You have drainage that looks like it is infected.  ? You experience severe pain  ? You develop hives or a rash or unexplained itching  ? You develop shortness of breath  ? You develop a temperature of 101 degrees F or greater  ? You develop bloody clots or red urine after you are discharged  ? You develop chest pain or cough up blood, lightheadedness or fainting    ADDITIONAL INSTRUCTIONS  If you are taking Coumadin, restart tonight.  Follow up with your Coumadin Clinic or Primary Care MD to have your INR rechecked.    [facility] INTERVENTIONAL RADIOLOGY DEPARTMENT  Procedure Physician: Dr. Favian Helton              Date of procedure: July 31, 2020  Telephone Numbers: [phone] Monday-Friday 8:00 am to 4:30 pm  [phone] After 4:30 pm Monday-Friday, Weekends & Holidays.   Ask for the Interventional Radiologist on call.  Someone is on call 24 hrs/day  [facility] toll free number: [phone] Monday-Friday 8:00 am to 4:30 pm  [facility] Emergency Dept: [phone]          Additional Information About Your Visit       Wander Information    [facility]ander lets you send messages to your doctor, view your test results, renew your prescriptions, schedule appointments and more. To sign up, go to [email or web address] . Click on \"Log in\" on the left side of the screen, which will take you to the Welcome page. Then click on \"Sign up Now\" on the right side of the page.     You will be asked to enter the access code listed below, as well as some personal information. " Please follow the directions to create your username and password.     Your access code is: [email or web address]  Expires: 9/15/2020  2:40 PM     Your access code will  in 60 days. If you need help or a new code, please call your   [facility] clinic or [phone].       Care EveryWhere ID    This is your Care EveryWhere ID. This could be used by other organizations to access your [facility] medical records  [id number]       Your Vitals Were  Most recent update: 2020  3:09 PM    Blood Pressure   112/70 (BP Location: Left arm)    Pulse   60    Temperature   97.9  F (36.6  C) (Oral)    Respirations   16    Pulse Oximetry   99%          Primary Care Provider Fax #    Physician No Ref-Primary [phone]      Equal Access to Services    [facility]: Marcus harrello Soaries, waaxda luqadaha, qaybta kaalmada chayito, janet huddleston . So [facility] [place] [facility] [phone].    ATENCIÓN: Si habla español, tiene a vasquez disposición servicios gratuitos de asistencia lingüística. Brodie al [phone].    We comply with applicable federal and state civil rights laws, including the [place] Human Rights Act. We do not discriminate on the basis of race, color, creed, Yazidi, national origin, marital status, age, disability, sex, sexual orientation, or gender identity.       Thank you!    Thank you for choosing [facility] for your care. Our goal is always to provide you with excellent care. Hearing back from our patients is one way we can continue to improve our services. Please take a few minutes to complete the written survey that you may receive in the mail after you visit with us. Thank you!            Medication List      ASK your doctor about these medications          Morning Afternoon Evening Bedtime As Needed    diazepam 5 MG tablet  Also known as:  VALIUM  INSTRUCTIONS:  Take 5 mg by mouth                     docusate sodium 100 MG capsule  Also known as:  COLACE  INSTRUCTIONS:  Take 1  capsule (100 mg) by mouth 2 times daily                     FLUoxetine 20 MG tablet  INSTRUCTIONS:  20 mg 2 times daily                     mirtazapine 15 MG tablet  Also known as:  REMERON  INSTRUCTIONS:  Take 15 mg by mouth                     omeprazole 40 MG DR capsule  Also known as:  priLOSEC  INSTRUCTIONS:  Take 1 capsule (40 mg) by mouth daily                     ondansetron 4 MG ODT tab  Also known as:  ZOFRAN-ODT  INSTRUCTIONS:  Take 1 tablet (4 mg) by mouth every 8 hours as needed for nausea or vomiting                     polyethylene glycol 17 GM/SCOOP powder  Also known as:  MIRALAX  INSTRUCTIONS:  Take 17 g (1 capful) by mouth daily                     risperiDONE 1 MG tablet  Also known as:  risperDAL  INSTRUCTIONS:  1 mg once

## 2020-07-31 NOTE — PROGRESS NOTES
Prep complete for Liver Biopsy. Patients cousin at bedside will be transporting her home post procedure.

## 2020-07-31 NOTE — PROGRESS NOTES
IV discontinued. Discharge paperwork reviewed with patient and her cousin, escorted to lobby for transport home.

## 2020-07-31 NOTE — DISCHARGE INSTRUCTIONS
[facility]    Interventional Radiology  Patient Instructions Following Biopsy    AFTER YOU GO HOME  ? If you were given sedation DO NOT drive or operate machinery at home or at work for at least 24 hours  ? DO relax and take it easy for 48 hours, no strenuous activity for 24 hours  ? DO drink plenty of fluids  ? DO resume your regular diet, unless otherwise instructed by your Primary Physician  ? Keep the dressing dry and in place for 24 hours.  ? DO NOT SMOKE FOR AT LEAST 24 HOURS, if you have been given any medications that were to help you relax or sedate you during your procedure  ? DO NOT drink alcoholic beverages the day of your procedure  ? DO NOT do any strenuous exercise or lifting (> 10 lbs) for at least 7 days following your procedure  ? DO NOT take a bath or shower for at least 12 hours following your procedure  ? Remove dressing after shower the next day. Replace with Band aid for 2 days.  Never leave a wet dressing in place.  ? DO NOT make any important or legal decisions for 24 hours following your procedure  ? There should be minimum drainage from the biopsy site    CALL THE PHYSICIAN IF:  ? You start bleeding from the procedure site.  If you do start to bleed from that site, lie down flat and hold pressure on the site for a minimum of 10 minutes.  Your physician will tell you if you need to return to the hospital  ? You develop nausea or vomiting  ? You have excessive swelling, redness, or tenderness at the site  ? You have drainage that looks like it is infected.  ? You experience severe pain  ? You develop hives or a rash or unexplained itching  ? You develop shortness of breath  ? You develop a temperature of 101 degrees F or greater  ? You develop bloody clots or red urine after you are discharged  ? You develop chest pain or cough up blood, lightheadedness or fainting    ADDITIONAL INSTRUCTIONS  If you are taking Coumadin, restart tonight.  Follow up with your Coumadin  Clinic or Primary Care MD to have your INR rechecked.    [facility] INTERVENTIONAL RADIOLOGY DEPARTMENT  Procedure Physician: Dr. Favian Helton              Date of procedure: July 31, 2020  Telephone Numbers: [phone] Monday-Friday 8:00 am to 4:30 pm  [phone] After 4:30 pm Monday-Friday, Weekends & Holidays.   Ask for the Interventional Radiologist on call.  Someone is on call 24 hrs/day  [facility] toll free number: [phone] Monday-Friday 8:00 am to 4:30 pm  [facility] Emergency Dept: [phone]

## 2020-07-31 NOTE — IR NOTE
Interventional Radiology Intra-procedural Nursing Note    Patient Name: Joesph Zeng  Medical Record Number: [id number]  Today's Date: July 31, 2020    Procedure: Image guided liver biopsy  Proceduralist: Dr Helton    Sedation start time: 1405  Sedation end time: 1430  Sedation medications administered:   100 mcg of Fentanyl  2 mg of Versed  Total sedation time: 25 minutes    Procedure start time: 1403  Puncture time: 1408  Procedure end time: 1430    Report given to: CELESTINO Dawkins     Other Notes: Pt arrived to IR room CT2 from . Consent reviewed. Pt denies any questions or concerns regarding procedure. Pt positioned supine and monitored per protocol. Pt tolerated procedure without any noted complications. Pt transferred back to .    Sujey Dickey

## 2020-07-31 NOTE — IP AVS SNAPSHOT
[address] [address]                                    After Visit Summary   7/31/2020    Joesph Zeng    MRN: [id number]           After Visit Summary Signature Page    I have received my discharge instructions, and my questions have been answered. I have discussed any challenges I see with this plan with the nurse or doctor.    ..........................................................................................................................................  Patient/Patient Representative Signature      ..........................................................................................................................................  Patient Representative Print Name and Relationship to Patient    ..................................................               ................................................  Date                                   Time    ..........................................................................................................................................  Reviewed by Signature/Title    ...................................................              ..............................................  Date                                               Time          22EPIC Rev 08/18

## 2020-07-31 NOTE — PROGRESS NOTES
Patient arrived via cart from IR with RN s/p Liver Biopsy. VSS. Dressing to right lower side of abdomen CDI, no hematoma. Patinet denies pain. Tolerating clear liquids. Plan to discharge at 1630 per MD orders.

## 2020-07-31 NOTE — PRE-PROCEDURE
GENERAL PRE-PROCEDURE:   Procedure:  Liver biopsy  Date/Time:  7/31/2020 1:24 PM    Verbal consent obtained?: Yes    Written consent obtained?: Yes    Risks and benefits: Risks, benefits and alternatives were discussed    Consent given by:  Patient  Patient states understanding of procedure being performed: Yes    Patient's understanding of procedure matches consent: Yes    Procedure consent matches procedure scheduled: Yes    Expected level of sedation:  Moderate  Appropriately NPO:  Yes  ASA Class:  Class 2- mild systemic disease, no acute problems, no functional limitations  Mallampati  :  Grade 1- soft palate, uvula, tonsillar pillars, and posterior pharyngeal wall visible  Lungs:  Lungs clear with good breath sounds bilaterally  Heart:  Normal heart sounds and rate  History & Physical reviewed:  History and physical reviewed and no updates needed  Statement of review:  I have reviewed the lab findings, diagnostic data, medications, and the plan for sedation

## 2020-08-03 NOTE — LETTER
"August 11, 2020      Erlinda Zeng  [address] [address]        Dear ,    We are writing to inform you of the biopsy results from your recent upper endoscopy. Essentially they showed changes of backing up of blood flow from your liver (portal hypertension). There is nothing specific we would do for this finding other than treat the cancer in the liver that is causing the issue in the first place.     Resulted Orders   Surgical pathology exam   Result Value Ref Range    Copath Report       Patient Name: ERLINDA ZENG  MR#: [id number]  Specimen #: [id number]  Collected: 8/3/2020  Received: 8/3/2020  Reported: 8/4/2020 16:39  Ordering Phy(s): MARY DEL VALLE    For improved result formatting, select 'View Enhanced Report Format' under   Linked Documents section.    +++Original Report Follows Addendum+++    TO ORIGINAL REPORT  Status: Signed Out  Date Ordered:8/5/2020  Date Reported:8/5/2020 10:02  Signed Out By: Nathaly Hernandez M.D., GREGG, Physicians    INTERPRETATION:  Gastric biopsy:  Immunostain, with appropriate controls, on block A1 is   negative for H. pylori    ORIGINAL REPORT:    SPECIMEN(S):  Gastric biopsy    FINAL DIAGNOSIS:  Stomach, Biopsy:  Gastric body and antral mucosa with reactive gastropathy and small   vascular ectasia, suggestive (clinically  rule out portal hypertensive gastropathy); negative for intestinal   metaplasia or dysplasia; report of H.  pylori immunohistochemistry to follow    I have personally reviewed all specimens and/or slides, including the    listed special stains, and used them  with my medical judgement to determine or confirm the final diagnosis.    Electronically signed out by:    Nathaly Hernandez M.D., GREGG, Irina    CLINICAL HISTORY:  Liver mass, r/o H. Pylori    GROSS:  The specimen is received in formalin with proper patient identification,   labeled \"gastric biopsies\".  The  specimen consists of six segments of tan soft tissue ranging from " 0.2-0.4   cm in greatest dimension.  It is  entirely submitted in cassette A1. (Dictated by: Alison GUZMAN [facility]   8/3/2020 04:41 PM)    MICROSCOPIC:  Microscopic examination was performed.    The technical component of this testing was completed at the [facility], with the professional component performed   at the [facility], [address] [place] [phone] ([phone])    CPT Codes:  A: 79971-QV6, 62088-DDZ    COLLECTION SITE:  Client:  [facility]  Location: [facility] (B)    Resident  [id number]         If you have any questions or concerns, please call the clinic at the number listed above.       Sincerely,    Kendrick Santizo MD  [facility]  Division of Gastroenterology and Hepatology  [facility] [address] [place], [place] [address]

## 2020-08-03 NOTE — ANESTHESIA CARE TRANSFER NOTE
Patient: Joesph Zeng    Procedure(s):  ESOPHAGOGASTRODUODENOSCOPY, WITH BIOPSY    Diagnosis: Liver mass [R16.0]  Diagnosis Additional Information: No value filed.    Anesthesia Type:   MAC     Note:  Airway :Room Air  Patient transferred to:Phase II  Comments: VSS and WNL, comfortable, no PONV, report to Cat RNHandoff Report: Identifed the Patient, Identified the Reponsible Provider, Reviewed the pertinent medical history, Discussed the surgical course, Reviewed Intra-OP anesthesia mangement and issues during anesthesia, Set expectations for post-procedure period and Allowed opportunity for questions and acknowledgement of understanding      Vitals: (Last set prior to Anesthesia Care Transfer)    CRNA VITALS  8/3/2020 1401 - 8/3/2020 1436      8/3/2020             EKG:  Sinus rhythm                Electronically Signed By: CRISTINE Vargas CRNA  August 3, 2020  2:36 PM

## 2020-08-03 NOTE — ANESTHESIA PREPROCEDURE EVALUATION
"Anesthesia Pre-Procedure Evaluation    Patient: Joesph Zeng   MRN:     [id number] Gender:   female   Age:    49 year old :      1970        Preoperative Diagnosis: Liver mass [R16.0]   Procedure(s):  ESOPHAGOGASTRODUODENOSCOPY (EGD)     LABS:  CBC:   Lab Results   Component Value Date    WBC 4.6 2020    WBC 5.9 2020    HGB 13.5 2020    HGB 15.0 2020    HCT 42.4 2020    HCT 46.1 2020     (L) 2020     (L) 2020     BMP:   Lab Results   Component Value Date     2020     2020    POTASSIUM 3.9 2020    POTASSIUM 3.9 2020    CHLORIDE 102 2020    CHLORIDE 103 2020    CO2 29 2020    CO2 26 2020    BUN 10 2020    BUN 15 2020    CR 0.80 2020    CR 0.63 2020    GLC 97 2020     (H) 2020     COAGS:   Lab Results   Component Value Date    INR 1.19 (H) 2020     POC:   Lab Results   Component Value Date    HCG Negative 2020     OTHER:   Lab Results   Component Value Date    A1C 6.6 (H) 2020    IMELDA 9.0 2020    ALBUMIN 3.3 (L) 2020    PROTTOTAL 9.6 (H) 2020    ALT 43 2020    AST 45 2020    ALKPHOS 110 2020    BILITOTAL 0.7 2020    LIPASE 152 2020    TSH 3.20 2020        Preop Vitals    BP Readings from Last 3 Encounters:   20 122/75   20 122/68   20 127/84    Pulse Readings from Last 3 Encounters:   20 70   20 70   20 70      Resp Readings from Last 3 Encounters:   20 16   20 18   20 16    SpO2 Readings from Last 3 Encounters:   20 95%   20 98%   20 98%      Temp Readings from Last 1 Encounters:   20 36.3  C (97.4  F) (Temporal)    Ht Readings from Last 1 Encounters:   20 1.6 m (5' 3\")      Wt Readings from Last 1 Encounters:   20 111.1 kg (245 lb)    Estimated body mass index is 43.4 kg/m  as calculated from " "the following:    Height as of 7/28/20: 1.6 m (5' 3\").    Weight as of 7/28/20: 111.1 kg (245 lb).     LDA:  Peripheral IV 07/13/20 Right Upper forearm (Active)   Number of days: 21        Past Medical History:   Diagnosis Date     Allergic rhinitis       Past Surgical History:   Procedure Laterality Date     CHOLECYSTECTOMY  2001      No Known Allergies     Anesthesia Evaluation     . Pt has had prior anesthetic.     No history of anesthetic complications          ROS/MED HX    ENT/Pulmonary: Comment: Allergic rhinitis      (-) tobacco use   Neurologic:  - neg neurologic ROS     Cardiovascular:  - neg cardiovascular ROS       METS/Exercise Tolerance:  >4 METS   Hematologic:  - neg hematologic  ROS       Musculoskeletal:  - neg musculoskeletal ROS       GI/Hepatic:  - neg GI/hepatic ROS       Renal/Genitourinary:  - ROS Renal section negative       Endo:     (+) type II DM Obesity, .      Psychiatric:  - neg psychiatric ROS   (+) psychiatric history other (comment), anxiety and depression      Infectious Disease:  - neg infectious disease ROS       Malignancy:      - no malignancy   Other:    - neg other ROS                     PHYSICAL EXAM:   Mental Status/Neuro: A/A/O   Airway: Facies: Thick Neck  Mallampati: II  Mouth/Opening: Full  TM distance: > 6 cm  Neck ROM: Full   Respiratory: Auscultation: CTAB     Resp. Rate: Normal     Resp. Effort: Normal      CV: Rhythm: Regular  Rate: Age appropriate  Heart: Normal Sounds  Edema: None   Comments:      Dental: Normal Dentition                Assessment:   ASA SCORE: 2    H&P: History and physical reviewed and following examination; no interval change.   Smoking Status:  Non-Smoker/Unknown   NPO Status: NPO Appropriate     Plan:   Anes. Type:  MAC   Pre-Medication: None   Induction:  N/a   Airway: Native Airway   Access/Monitoring: PIV   Maintenance: N/a     Postop Plan:   Postop Pain: None  Postop Sedation/Airway: Not planned  Disposition: Outpatient     PONV " Management:  NO PONV Prophylaxis Required     CONSENT: Direct conversation   Plan and risks discussed with: Patient                      Leeroy Clements DO

## 2020-08-03 NOTE — ANESTHESIA POSTPROCEDURE EVALUATION
Anesthesia POST Procedure Evaluation    Patient: Joesph Zeng   MRN:     [id number] Gender:   female   Age:    49 year old :      1970        Preoperative Diagnosis: Liver mass [R16.0]   Procedure(s):  ESOPHAGOGASTRODUODENOSCOPY, WITH BIOPSY   Postop Comments: No value filed.     Anesthesia Type: MAC       Disposition: Outpatient   Postop Pain Control: Uneventful            Sign Out: Well controlled pain   PONV: No   Neuro/Psych: Uneventful            Sign Out: Acceptable/Baseline neuro status   Airway/Respiratory: Uneventful            Sign Out: Acceptable/Baseline resp. status   CV/Hemodynamics: Uneventful            Sign Out: Acceptable CV status   Other NRE: NONE   DID A NON-ROUTINE EVENT OCCUR? No         Last Anesthesia Record Vitals:  CRNA VITALS  8/3/2020 1401 - 8/3/2020 1500      8/3/2020             EKG:  Sinus rhythm          Last PACU Vitals:  Vitals Value Taken Time   /83 8/3/2020  2:25 PM   Temp 36.2  C (97.1  F) 8/3/2020  2:25 PM   Pulse 89 8/3/2020  2:25 PM   Resp 16 8/3/2020  2:25 PM   SpO2 97 % 8/3/2020  2:25 PM   Temp src Available 8/3/2020  2:16 PM   NIBP 132/85 8/3/2020  2:25 PM   Pulse 94 8/3/2020  2:25 PM   SpO2 90 % 8/3/2020  2:25 PM   Resp 14 8/3/2020  2:25 PM   Temp     Ht Rate 91 8/3/2020  2:25 PM   Temp 2           Electronically Signed By: Leeroy Clements DO, August 3, 2020, 3:00 PM

## 2020-08-07 NOTE — LETTER
"    8/7/2020         RE: Joesph Zeng  [address]  [place] [facility] [address]        Dear Colleague,    Thank you for referring your patient, Joesph Zeng, to the [facility]. Please see a copy of my visit note below.    Joesph Zeng is a 49 year old female who is being evaluated via a billable telephone visit.      The patient has been notified of following:     \"This telephone visit will be conducted via a call between you and your physician/provider. We have found that certain health care needs can be provided without the need for a physical exam.  This service lets us provide the care you need with a short phone conversation.  If a prescription is necessary we can send it directly to your pharmacy.  If lab work is needed we can place an order for that and you can then stop by our lab to have the test done at a later time.    Telephone visits are billed at different rates depending on your insurance coverage. During this emergency period, for some insurers they may be billed the same as an in-person visit.  Please reach out to your insurance provider with any questions.    If during the course of the call the physician/provider feels a telephone visit is not appropriate, you will not be charged for this service.\"    Patient has given verbal consent for Telephone visit?  Yes    What phone number would you like to be contacted at? [phone]    How would you like to obtain your AVS? Mail a copy    Phone call duration: 23 minutes    Laurie Fraser MD    [facility]  MEDICAL ONCOLOGY    RETURN PATIENT VISIT NOTE    Reason for visit: Intrahepatic cholangiocarcinoma     Oncology treatment summary  1.  Began having symptoms October 2019 with abdominal bloating and constipation with nausea.  Eventually she had a 30 pound weight loss with increasing GI symptoms  2.  Abdominal ultrasound 7/10/2020 with a large mass in the upper abdomen along with a large liver mass  3.  CT abdomen " pelvis performed 7/13/2020 with a large hypoattenuating mass in the inferior right lobe of the liver (segment 6) measuring up to 6.5 cm.  Additionally there was a mass in the caudate lobe of the liver which appeared to surround the portal vein.  This measures 6 cm.  There was a large hypoattenuating necrotic lymph node in the aortocaval region measuring 3.5 cm along with other retroperitoneal adenopathy seen posterior to the left renal vein measuring up to 2.6 cm     HISTORY OF PRESENTING ILLNESS  Patient is a 49-year-old who did not show for her face-to-face appointment today.  We called her and she was willing to do a telephone appointment.  She seems quite fatigued and tired well on the phone.  She did not have anyone in the room with her and did not want anyone on speaker phone.  She states she continues to have her same symptoms with abdominal pain in the rib cage and abdomen.  He does have intermittent nausea.  Her worst symptom is fatigue.  Since we last talked she has had a CT scan of her chest EGD and biopsy of her liver mass.    Of note I asked her regarding her previous hepatitis B test which demonstrated an elevated quantitative DNA level in 2007 in 2009.  She states that she knew she was a carrier for hepatitis B but was never told it was an infectious problem.  She has never seen anyone for this.         PAST MEDICAL HISTORY  Depression, PTSD, cholecystectomy      CURRENT OUTPATIENT MEDICATIONS  Current Outpatient Medications   Medication     diazepam (VALIUM) 5 MG tablet     docusate sodium (COLACE) 100 MG capsule     FLUoxetine 20 MG tablet     mirtazapine (REMERON) 15 MG tablet     ondansetron (ZOFRAN-ODT) 4 MG ODT tab     polyethylene glycol (MIRALAX) powder     risperiDONE (RISPERDAL) 1 MG tablet     No current facility-administered medications for this visit.         ALLERGIES   No Known Allergies     SOCIAL HISTORY  No tobacco use, she is single and has a fiancé.  She lives in [place].  She  has 7 children.  She works at home.  No tobacco or excessive alcohol use.     FAMILY HISTORY  No malignancies in her family history that she is aware of     REVIEW OF SYSTEMS  Review Of Systems  Skin: negative  Eyes: negative  Ears/Nose/Throat: negative  Respiratory: No shortness of breath, dyspnea on exertion, cough, or hemoptysis  Cardiovascular: negative  Gastrointestinal: negative per HPI  Genitourinary: negative  Musculoskeletal: negative  Neurologic: negative  Psychiatric: negative  Hematologic/Lymphatic/Immunologic: negative  Endocrine: negative      PHYSICAL EXAM  B/P: Data Unavailable, T: Data Unavailable, P: Data Unavailable, R: Data Unavailable  Wt Readings from Last 3 Encounters:   08/07/20 111.1 kg (245 lb)   07/28/20 111.1 kg (245 lb)   07/17/20 113.4 kg (250 lb)         GEN: NAD  She is somewhat sedated on her phone call today uncertain if she is grasping the seriousness of what is occurring          LABORATORY AND IMAGING STUDIES  Recent Labs   Lab Test 07/02/20  1402 03/05/20  1102    136   POTASSIUM 3.9 3.9   CHLORIDE 102 103   CO2 29 26   ANIONGAP 7 7   BUN 10 15   CR 0.80 0.63   GLC 97 126*   IEMLDA 9.0 8.8     No results for input(s): MAG, PHOS in the last 89072 hours.  Recent Labs   Lab Test 07/02/20  1402 03/05/20  1102   WBC 5.9 6.1   HGB 15.0 15.2   * 194   MCV 90 90   NEUTROPHIL 61.4 59.4     Recent Labs   Lab Test 07/02/20  1402 03/05/20  1102   BILITOTAL 0.7 0.8   ALKPHOS 110 114   ALT 43 89*   AST 45 90*   ALBUMIN 3.3* 3.3*     TSH   Date Value Ref Range Status   03/05/2020 3.20 0.40 - 4.00 mU/L Final     No results for input(s): CEA in the last 26611 hours.  Results for orders placed or performed during the hospital encounter of 07/13/20   CT Abdomen Pelvis w Contrast    Narrative    CT ABDOMEN PELVIS WITH CONTRAST  7/13/2020 3:57 PM    HISTORY: Large abdominal mass seen on ultrasound 7/10/20, concern is  for metastasis. Abdominal pain, unspecified abdominal  location.    TECHNIQUE: Scans obtained from the diaphragm through the pelvis with  IV contrast, Isovue 370, 100mL.   Radiation dose for this scan was reduced using automated exposure  control, adjustment of the mA and/or kV according to patient size, or  iterative reconstruction technique.    COMPARISON:  Limited abdominal ultrasound dated 7/10/2020.    FINDINGS: Visualized portions of the lung bases and mediastinal  contents are grossly unremarkable. No aggressive osseous lesions or  acute osseous fractures are seen. There are degenerative changes in  the spine. Sclerosis at the left SI joint could represent chronic  sacral ileitis versus degenerative change.    There is a large hypoattenuating mass in the inferior right lobe of  the liver (segment 6) measuring up to 6.5 cm in diameter. Additionally  there is a mass in the caudate lobe of the liver which appears to  surround the portal vein. This measures up to 6.0 cm in diameter.  There is a large hypoattenuating necrotic lymph node in the aortocaval  region (image 33 series 2) measuring up to 3.5 cm in short axis. Other  retroperitoneal lymphadenopathy seen posterior to the left renal vein  measuring up to 2.6 cm in short axis. The liver, pancreas, spleen,  bilateral adrenal glands, and bilateral kidneys otherwise enhance  normally. No hydronephrosis, nephrolithiasis, hydroureter or ureteral  calculus is identified. Urinary bladder is mostly decompressed but is  otherwise unremarkable.    No other significant lymphadenopathy is seen in the abdomen or pelvis.  No free fluid or free air is seen in the peritoneal cavity. Uterus and  ovaries are grossly unremarkable.    The colon is of normal caliber without pericolonic inflammatory change  to suggest acute diverticulitis. Appendix is not well seen. No obvious  pericecal inflammatory changes are seen. Small bowel is grossly of  normal caliber. Stomach contains fluid and air. There is thickening of  the wall of the  gastric antrum/pyloric region which is likely normal  for this patient. A neoplastic process in this region is considered  less likely, but difficult to exclude.      Impression    IMPRESSION:  1. Large mass at the caudate lobe of the liver surrounding the portal  vein and there is an associated mass in the inferior right lobe of the  liver and significant necrotic lymphadenopathy in the retroperitoneum.  This is most consistent with malignancy. This could represent primary  hepatic origin, but could also represent metastasis although no other  primary lesions are seen.  2. Mild prominence of the wall of the gastric antrum/pylorus likely  represents a normal finding in this patient. Neoplastic infiltration  is considered less likely.    SAMANTHA TEJADA MD     US liver  HISTORY:    Right upper quadrant pain. Postprandial abdominal pain.     FINDINGS:    The liver measures a craniocaudal dimension of 18.9 cm.  There is a hypoechoic mass in the inferior right lobe of the liver  with internal blood flow measuring approximately 9.2 x 8.3 x 5.7 cm.  There is an irregular hypoechoic mass posterior to the left lobe of  the liver, of uncertain origin, possibly pancreatic head or caudate  lobe, measuring 4.5 x 7.2 x 6.5 cm. Liver echogenicity is normal. The  spleen measures 15.6 cm. The gallbladder has been removed. The  diameter of the common bile duct measures 4mm. The pancreas is  partially obscured but otherwise appears unremarkable. The aorta and  IVC are visualized. The right and left kidneys measure 11.8cm and 12.9  cm , respectively. No solid renal mass, stone or hydronephrosis.                                                                      IMPRESSION:      1. Suspicious large mass in the upper abdomen and large liver mass,  concerning for metastatic malignancy. Recommend CT of the abdomen and  pelvis with contrast for further evaluation.  2. Cholecystectomy.         EVY GIRON MD           ASSESSMENT   AND RECOMMENDATIONS:  1.  Intrahepatic cholangiocarcinoma weekly stage Marybeth with apparent carolann involvement on CT scan  2.  History of hepatitis B infection question current status  3.  Depression    Plan    I had a telephone call that lasted around 25 minutes with the patient regarding her diagnosis prognosis and options for therapy.  I am concerned that she did not entirely grasp the seriousness of her diagnosis.  Of note she has no communication deficits or difficulties speaking and understands and speaks fluent English.    Nevertheless we did discuss that her disease appears to be intrahepatic cholangiocarcinoma based on the biopsy.  Based on her CT scan she does appear to have a retroperitoneal lymph node involved.  I told her that this likely renders her unresectable given the distant adenopathy.  However I would still like her to see the pancreaticobiliary surgeons to discuss resectability as she is questioning this.    We will request mismatch repair testing and NGS testing to look for NTRK and FGFR2 fusion rearrangements.    We discussed therapy mainly in the form of chemotherapy which would be palliative.  The options of course would include chemoradiation, although she appears to have somewhat distant disease on her CTA with retroperitoneal adenopathy and that would make radiation less beneficial.  For the most part I discussed palliative chemotherapy with her.  For the time being I will have her do blood tests with hepatitis B viral studies given her previous viral load of greater than 9,002,009.  In addition to this we will have a Port-A-Cath placed have her meet with hepatobiliary surgery and I have asked her to please meet with me in person with the family members to begin by her discussed this to ensure that she understands the significance of her disease.  I did multiple times during our discussion reiterate that therapy is most likely to be palliative.    Laurie Fraser MD on 8/7/2020 at  2:45 PM        Again, thank you for allowing me to participate in the care of your patient.        Sincerely,        Laurie Fraser MD

## 2020-08-07 NOTE — PROGRESS NOTES
Social Work Note: Telephone Call  Oncology Clinic    Data/Intervention:  Patient Name:  Joesph Zeng  /Age:  1970 (49 year old)    Call From:  BENJAMIN  Reason for Call:  Insurance questions    Assessment:  SW received referral to contact Joesph re: help with insurance. BENJAMIN called and spoke to her this afternoon. She reports she has Medicare, but is left with significant bills without a secondary. She would like to apply for MA. BENJAMIN spoke with Joesph about the process and encouraged her to reach out to [facility] to start the application. Joesph states she has a friend that can help her with it, she had just thought BENJAMIN could do the application for her. BENJAMIN explained that as they are [facility] programs, benjamin isn't connected to those programs and it would be best that if she has a friend that can help to go that route.     Joesph and BENJAMIN spoke briefly about her history. She has been treated for depression for approximately the last 10 years, started as post partum depression after the birth of her 10 year old twins. She has been treated with medication since then. In total Joesph has seven children, four still at home. She reports the children are covered under her boyfriend. Joesph gets approximately 1400/month in disability income, though the Part B premium is deducted from that amount. She denies any other needs at this time, she will pursue the MA application.     Plan:  SW will remain available as needed and appropriate. BENJAMIN will plan to follow and check in with patient as she is getting started with treatment.     ROC Solorzano, [facility]  Clinical , Adult Oncology  Pager: [phone]  Phone: [phone]

## 2020-08-07 NOTE — PROGRESS NOTES
Contacted patient to introduce self as RN Care Coordinator.  Patient had telephone consultation with Dr. rFaser today - did not show for face to face visit.  Patient has diagnosis of cholangiocarcinoma with a large mass in the upper abdomen and large liver mass.  Explained upcoming plan with need for PORT placement and COVID test prior to this procedure.  She is understandably overwhelmed; our  Laurie, has contacted her regarding insurance questions.  She will come in face to face next Friday, 8/14 to discuss chemotherapy in more detail and to meet .  Scheduling will contact her with appointments.  She will have some labs done this Monday ordered by Dr. Fraser.  She will need chemotherapy education in more detail when she comes to clinic.

## 2020-08-07 NOTE — TELEPHONE ENCOUNTER
New Patient Oncology Nurse Navigator Note     Referring provider: Dr. Fraser     Referring Clinic/Organization: [facility]     Referred to: Surgical Oncology -  Hepatobiliary / GI Cancers    Requested provider (if applicable): Dr. Jong White    Referral Received: 08/07/20       Evaluation for : Cholangiocarcinoma     Records Location: [facility]     Records Needed:     - None.     Additional testing needed prior to consult:     - None at this time.     Referral updates and Plan:       Consult with Surgical Oncology     08/07/2020 3:36 PM - Called and spoke with patient regarding referral to meet with surgeon. Informed her that our office will be in touch to schedule the consult. She agreed with plan and has no questions at this time.     Rita Doherty, RN, BSN   Surgical Oncology New Patient Nurse Navigator  [facility]  [phone]

## 2020-08-07 NOTE — PROGRESS NOTES
"Joesph Zeng is a 49 year old female who is being evaluated via a billable telephone visit.      The patient has been notified of following:     \"This telephone visit will be conducted via a call between you and your physician/provider. We have found that certain health care needs can be provided without the need for a physical exam.  This service lets us provide the care you need with a short phone conversation.  If a prescription is necessary we can send it directly to your pharmacy.  If lab work is needed we can place an order for that and you can then stop by our lab to have the test done at a later time.    Telephone visits are billed at different rates depending on your insurance coverage. During this emergency period, for some insurers they may be billed the same as an in-person visit.  Please reach out to your insurance provider with any questions.    If during the course of the call the physician/provider feels a telephone visit is not appropriate, you will not be charged for this service.\"    Patient has given verbal consent for Telephone visit?  Yes    What phone number would you like to be contacted at? [phone]    How would you like to obtain your AVS? Mail a copy    Phone call duration: 23 minutes    Laurie Fraser MD    [facility]  MEDICAL ONCOLOGY    RETURN PATIENT VISIT NOTE    Reason for visit: Intrahepatic cholangiocarcinoma     Oncology treatment summary  1.  Began having symptoms October 2019 with abdominal bloating and constipation with nausea.  Eventually she had a 30 pound weight loss with increasing GI symptoms  2.  Abdominal ultrasound 7/10/2020 with a large mass in the upper abdomen along with a large liver mass  3.  CT abdomen pelvis performed 7/13/2020 with a large hypoattenuating mass in the inferior right lobe of the liver (segment 6) measuring up to 6.5 cm.  Additionally there was a mass in the caudate lobe of the liver which appeared to surround the portal " vein.  This measures 6 cm.  There was a large hypoattenuating necrotic lymph node in the aortocaval region measuring 3.5 cm along with other retroperitoneal adenopathy seen posterior to the left renal vein measuring up to 2.6 cm     HISTORY OF PRESENTING ILLNESS  Patient is a 49-year-old who did not show for her face-to-face appointment today.  We called her and she was willing to do a telephone appointment.  She seems quite fatigued and tired well on the phone.  She did not have anyone in the room with her and did not want anyone on speaker phone.  She states she continues to have her same symptoms with abdominal pain in the rib cage and abdomen.  He does have intermittent nausea.  Her worst symptom is fatigue.  Since we last talked she has had a CT scan of her chest EGD and biopsy of her liver mass.    Of note I asked her regarding her previous hepatitis B test which demonstrated an elevated quantitative DNA level in 2007 in 2009.  She states that she knew she was a carrier for hepatitis B but was never told it was an infectious problem.  She has never seen anyone for this.         PAST MEDICAL HISTORY  Depression, PTSD, cholecystectomy      CURRENT OUTPATIENT MEDICATIONS  Current Outpatient Medications   Medication     diazepam (VALIUM) 5 MG tablet     docusate sodium (COLACE) 100 MG capsule     FLUoxetine 20 MG tablet     mirtazapine (REMERON) 15 MG tablet     ondansetron (ZOFRAN-ODT) 4 MG ODT tab     polyethylene glycol (MIRALAX) powder     risperiDONE (RISPERDAL) 1 MG tablet     No current facility-administered medications for this visit.         ALLERGIES   No Known Allergies     SOCIAL HISTORY  No tobacco use, she is single and has a fiancé.  She lives in [place].  She has 7 children.  She works at home.  No tobacco or excessive alcohol use.     FAMILY HISTORY  No malignancies in her family history that she is aware of     REVIEW OF SYSTEMS  Review Of Systems  Skin: negative  Eyes:  negative  Ears/Nose/Throat: negative  Respiratory: No shortness of breath, dyspnea on exertion, cough, or hemoptysis  Cardiovascular: negative  Gastrointestinal: negative per HPI  Genitourinary: negative  Musculoskeletal: negative  Neurologic: negative  Psychiatric: negative  Hematologic/Lymphatic/Immunologic: negative  Endocrine: negative      PHYSICAL EXAM  B/P: Data Unavailable, T: Data Unavailable, P: Data Unavailable, R: Data Unavailable  Wt Readings from Last 3 Encounters:   08/07/20 111.1 kg (245 lb)   07/28/20 111.1 kg (245 lb)   07/17/20 113.4 kg (250 lb)         GEN: NAD  She is somewhat sedated on her phone call today uncertain if she is grasping the seriousness of what is occurring          LABORATORY AND IMAGING STUDIES  Recent Labs   Lab Test 07/02/20  1402 03/05/20  1102    136   POTASSIUM 3.9 3.9   CHLORIDE 102 103   CO2 29 26   ANIONGAP 7 7   BUN 10 15   CR 0.80 0.63   GLC 97 126*   IMELDA 9.0 8.8     No results for input(s): MAG, PHOS in the last 28257 hours.  Recent Labs   Lab Test 07/02/20  1402 03/05/20  1102   WBC 5.9 6.1   HGB 15.0 15.2   * 194   MCV 90 90   NEUTROPHIL 61.4 59.4     Recent Labs   Lab Test 07/02/20  1402 03/05/20  1102   BILITOTAL 0.7 0.8   ALKPHOS 110 114   ALT 43 89*   AST 45 90*   ALBUMIN 3.3* 3.3*     TSH   Date Value Ref Range Status   03/05/2020 3.20 0.40 - 4.00 mU/L Final     No results for input(s): CEA in the last 98161 hours.  Results for orders placed or performed during the hospital encounter of 07/13/20   CT Abdomen Pelvis w Contrast    Narrative    CT ABDOMEN PELVIS WITH CONTRAST  7/13/2020 3:57 PM    HISTORY: Large abdominal mass seen on ultrasound 7/10/20, concern is  for metastasis. Abdominal pain, unspecified abdominal location.    TECHNIQUE: Scans obtained from the diaphragm through the pelvis with  IV contrast, Isovue 370, 100mL.   Radiation dose for this scan was reduced using automated exposure  control, adjustment of the mA and/or kV according  to patient size, or  iterative reconstruction technique.    COMPARISON:  Limited abdominal ultrasound dated 7/10/2020.    FINDINGS: Visualized portions of the lung bases and mediastinal  contents are grossly unremarkable. No aggressive osseous lesions or  acute osseous fractures are seen. There are degenerative changes in  the spine. Sclerosis at the left SI joint could represent chronic  sacral ileitis versus degenerative change.    There is a large hypoattenuating mass in the inferior right lobe of  the liver (segment 6) measuring up to 6.5 cm in diameter. Additionally  there is a mass in the caudate lobe of the liver which appears to  surround the portal vein. This measures up to 6.0 cm in diameter.  There is a large hypoattenuating necrotic lymph node in the aortocaval  region (image 33 series 2) measuring up to 3.5 cm in short axis. Other  retroperitoneal lymphadenopathy seen posterior to the left renal vein  measuring up to 2.6 cm in short axis. The liver, pancreas, spleen,  bilateral adrenal glands, and bilateral kidneys otherwise enhance  normally. No hydronephrosis, nephrolithiasis, hydroureter or ureteral  calculus is identified. Urinary bladder is mostly decompressed but is  otherwise unremarkable.    No other significant lymphadenopathy is seen in the abdomen or pelvis.  No free fluid or free air is seen in the peritoneal cavity. Uterus and  ovaries are grossly unremarkable.    The colon is of normal caliber without pericolonic inflammatory change  to suggest acute diverticulitis. Appendix is not well seen. No obvious  pericecal inflammatory changes are seen. Small bowel is grossly of  normal caliber. Stomach contains fluid and air. There is thickening of  the wall of the gastric antrum/pyloric region which is likely normal  for this patient. A neoplastic process in this region is considered  less likely, but difficult to exclude.      Impression    IMPRESSION:  1. Large mass at the caudate lobe of the  liver surrounding the portal  vein and there is an associated mass in the inferior right lobe of the  liver and significant necrotic lymphadenopathy in the retroperitoneum.  This is most consistent with malignancy. This could represent primary  hepatic origin, but could also represent metastasis although no other  primary lesions are seen.  2. Mild prominence of the wall of the gastric antrum/pylorus likely  represents a normal finding in this patient. Neoplastic infiltration  is considered less likely.    SAMANTHA TEJADA MD     US liver  HISTORY:    Right upper quadrant pain. Postprandial abdominal pain.     FINDINGS:    The liver measures a craniocaudal dimension of 18.9 cm.  There is a hypoechoic mass in the inferior right lobe of the liver  with internal blood flow measuring approximately 9.2 x 8.3 x 5.7 cm.  There is an irregular hypoechoic mass posterior to the left lobe of  the liver, of uncertain origin, possibly pancreatic head or caudate  lobe, measuring 4.5 x 7.2 x 6.5 cm. Liver echogenicity is normal. The  spleen measures 15.6 cm. The gallbladder has been removed. The  diameter of the common bile duct measures 4mm. The pancreas is  partially obscured but otherwise appears unremarkable. The aorta and  IVC are visualized. The right and left kidneys measure 11.8cm and 12.9  cm , respectively. No solid renal mass, stone or hydronephrosis.                                                                      IMPRESSION:      1. Suspicious large mass in the upper abdomen and large liver mass,  concerning for metastatic malignancy. Recommend CT of the abdomen and  pelvis with contrast for further evaluation.  2. Cholecystectomy.         EVY GIRON MD           ASSESSMENT  AND RECOMMENDATIONS:  1.  Intrahepatic cholangiocarcinoma weekly stage Marybeth with apparent carolann involvement on CT scan  2.  History of hepatitis B infection question current status  3.  Depression    Plan    I had a telephone call that  lasted around 25 minutes with the patient regarding her diagnosis prognosis and options for therapy.  I am concerned that she did not entirely grasp the seriousness of her diagnosis.  Of note she has no communication deficits or difficulties speaking and understands and speaks fluent English.    Nevertheless we did discuss that her disease appears to be intrahepatic cholangiocarcinoma based on the biopsy.  Based on her CT scan she does appear to have a retroperitoneal lymph node involved.  I told her that this likely renders her unresectable given the distant adenopathy.  However I would still like her to see the pancreaticobiliary surgeons to discuss resectability as she is questioning this.    We will request mismatch repair testing and NGS testing to look for NTRK and FGFR2 fusion rearrangements.    We discussed therapy mainly in the form of chemotherapy which would be palliative.  The options of course would include chemoradiation, although she appears to have somewhat distant disease on her CTA with retroperitoneal adenopathy and that would make radiation less beneficial.  For the most part I discussed palliative chemotherapy with her.  For the time being I will have her do blood tests with hepatitis B viral studies given her previous viral load of greater than 9,002,009.  In addition to this we will have a Port-A-Cath placed have her meet with hepatobiliary surgery and I have asked her to please meet with me in person with the family members to begin by her discussed this to ensure that she understands the significance of her disease.  I did multiple times during our discussion reiterate that therapy is most likely to be palliative.    Laurie Fraser MD on 8/7/2020 at 2:45 PM

## 2020-08-07 NOTE — NURSING NOTE
SYMPTOM QUESTIONNAIRE    Pain: 5/10 abdomen and right ribcage- not taking anything to help    Nausea/Vomiting: nausea- not taking anything to help    Mouth Sores: had one that has since resolved    Shortness of Breath: no    Smoking: no    Fever or Chills: no    Hard Stools: no    Soft Stools: no    Weight Loss: no    Weakness: fatigue    Burning, numbness or tingling in hands or feet: no    Problems with skin or swelling: no    Memory Loss: no    Anxiety or Depression: yes    Trouble Sleeping: no    Luna Martinez LPN on 8/7/2020 at 1:43 PM

## 2020-08-10 NOTE — PROGRESS NOTES
Left patient a voicemail, stating I will attempt to call her back regarding her symptoms of increased pain, shortness of breath.  Left triage phone number and writer's also for patient to call back.    Patient returned call to writer; she states her pain under her right rib cage is getting worse, she felt short of breath from the pain.  She would like something for pain and Dr. Fraser has offered to prescribe her something.  She is anxious to get chemotherapy started; PORT placement scheduled for 8/17 at [facility].  Advised that if pain is not well controlled, she could go to the ER to be admitted for better pain control.

## 2020-08-10 NOTE — TELEPHONE ENCOUNTER
Oncology/Surgical Oncology Referral Request:     Specialty Requested: Medical Oncology     Referring Provider: Laurie Rocha MD    Referring Clinic/Organization: [facility]    Records location: [facility]     Requested Provider (if specified): Not Specified

## 2020-08-10 NOTE — TELEPHONE ENCOUNTER
Received call from patient stating she has pain under right ribs rating 6-9/10. Currently pain is 6/10. Patient states she isn't eating because when she eats, the pain is worse. The only medication she has taken today is Omeprazole. She is taking small sips of fluids. Bowels are normal. She has lost 5 lbs in a few days. She hasn't taken anything for the pain. She is gasping for air every few minutes as when she takes a breath the pain is worse. Patient has a lab appointment today at 10:10 am. Patient states she will come in for the lab appointment. Informed patient that I would watch for the lab results and discuss plan with Hien and Dr. Fraser.

## 2020-08-10 NOTE — TELEPHONE ENCOUNTER
Called patient.Left  with hours and phone. Letter sent for follow up. Referral in [facility].

## 2020-08-11 NOTE — PROGRESS NOTES
I spoke to patient today - she was in a car and again was short of breath but she states she just needs to go home and rest that this is the norm for her.  She continues to have pain under her right rib cage.   See previous notes from yesterday.  Writer called to inform patient of upcoming appointments and to let her know that her pain medication was sent to [facility] pharmacy.    Advised her to go to the [facility] if she worsens or has poor pain control but she really wants to stay out of the hospital.  She hasn't picked up her prescription yet for the Oxycodone; I told her it should be ready at [facility].  Advised her that questions she has about staging could be discussed this Friday.

## 2020-08-11 NOTE — TELEPHONE ENCOUNTER
RECORDS STATUS - ALL OTHER DIAGNOSIS      RECORDS RECEIVED FROM: [facility] - Internal Referral   DATE RECEIVED: 20   NOTES STATUS DETAILS   OFFICE NOTE from referring provider Niles Fraser   OFFICE NOTE from medical oncologist Niles Fraser: 20   DISCHARGE SUMMARY from hospital [facility] 20   DISCHARGE REPORT from the ER NA    OPERATIVE REPORT Epic 8/3/20: Upper GI Endoscopy   MEDICATION LIST [facility] 8/10/20   CLINICAL TRIAL TREATMENTS TO DATE     LABS     PATHOLOGY REPORTS [facility] 8/3/20, 20   ANYTHING RELATED TO DIAGNOSIS Epic 8/10/20   GENONOMIC TESTING     TYPE:     IMAGING (NEED IMAGES & REPORT)     XR PACS    CT SCANS PACS 20, 20, 20   MRI     MAMMO     ULTRASOUND PACS 7/10/20   PET

## 2020-08-11 NOTE — RESULT ENCOUNTER NOTE
Pathology from EGD reviewed. Findings of portal hypertensive gastropathy on biopsies consistent with the varices seen on endoscopy. See procedure report for details and recommendations. Letter sent to patient.    Kendrick Santizo MD  [facility]  Division of Gastroenterology and Hepatology  [facility] [address], [place] [address]

## 2020-08-12 PROBLEM — C24.9: Status: ACTIVE | Noted: 2020-01-01

## 2020-08-13 NOTE — PROGRESS NOTES
[facility] - Surgical Oncology    NEW CONSULTATION  Aug 14, 2020    Reason for Visit:    Joesph Zeng is a 49 year old female with likely intra-hepatic cholangiocarcinoma.  She was referred by Dr Fraser.    Pertinent Oncologic History: 49 F w/ likely intra-hepatic cholangiocarcinoma.  She had symptoms of abdominal bloating and nausea beginning in late 2019 that subsequently evolved into increasing fatigue, issues with eating, and a 30 pound+ weight loss that was unintended.  Abdominal US 7/10/2020 showed a suspicious large upper abdominal mass and liver mass.  She underwent contrast enhanced CTAP 7/13/2020 that showed a 6.5 cm segment 6 mass, 6 cm caudate mass vs. bairon-portal lymph node, a likely 3.5 cm necrotic aortocaval lymph node, and an additional 2.6 cm lymph node near the left renal vein.  There was also non-specific distal gastric thickening.  CT guided biopsy of the segment 6 mass on 7/31/2020 showed adenocarcinoma and EBV positivity consistent with EBV-associated intra-hepatic cholangiocarcinoma.  EGD with biopsies on 8/3/2020 demonstrated grade II esophageal varices and erosive gastritis with no evidence for malignancy.  The patient now presents for recommendations on management.    Pertinent Exam: Abdomen soft, non-tender, and non-distended.  No jaundice or scleral icterus.    HISTORY OF PRESENT ILLNESS: The patient is feeling relatively well, but continues to have some GI issues related to nausea and bloating.  Starting to have some back pain.  Hepatitis B carrier, depression, PTSD.  History of cholecystectomy.  No blood thinning medications.    Pertinent Work-Up/Findings:    Labs: LFTs 7/2/2020 were normal except albumin 3.3.  CBC 7/31/2020 normal w/ exception of platelets = 119.  INR 7/31/2020 =  1.19.  HBV virus titre positive.    Abdominal US (7/10/2020): IMPRESSION:      1. Suspicious large mass in the upper abdomen and large liver mass, concerning for metastatic malignancy.  Recommend CT of the abdomen and pelvis with contrast for further evaluation.  2. Cholecystectomy.    CTAP (7/13/2020): IMPRESSION:  1. Large mass at the caudate lobe of the liver surrounding the portal vein and there is an associated mass in the inferior right lobe of the liver and significant necrotic lymphadenopathy in the retroperitoneum.  This is most consistent with malignancy. This could represent primary hepatic origin, but could also represent metastasis although no other primary lesions are seen.  2. Mild prominence of the wall of the gastric antrum/pylorus likely represents a normal finding in this patient. Neoplastic infiltration is considered less likely.    CT Chest (7/24/2020): IMPRESSION:   1. No focal airspace opacity. Scattered right calcified pulmonary granulomas. No suspicious pulmonary nodules.  2. Redemonstration of heterogeneous hypodense right hepatic mass measuring up to 6.5 cm which appears to abut the patent common hepatic artery, better appreciated on CT abdomen and pelvis 7/13/2020.    EGD (8/3/2020): IMPRESSION:            - Grade II esophageal varices.   - Type 2 gastroesophageal varices (GOV2, esophageal varices which extend along the fundus), without bleeding.   - Varices likely related to underlying liver masses with portal compression although cannot rule out underlying liver cirrhosis/fibrosis. Given absence of bleeding history, varices not treated.   - Erosive gastritis. Biopsied to rule out H. pylori.  Likely source of CT findings and potentially some of her symptoms.   - Normal examined duodenum.     Biopsy (7/31/2020):   SPECIMEN(S):   Liver lesion biopsy, CT guided     FINAL DIAGNOSIS:   Liver, CT Guided Needle Biopsy of Right Lobe Mass:   Adenocarcinoma, moderately differentiated:    - EBV-positive admixed with rich lymphoplasmacytic infiltrate.    - Compatible with EBV-associated intrahepatic cholangiocarcinoma.  (See Comment)    Assessment/Counseling/Plan:    Joesph Zeng is a  49 year old female with likely stage IV intra-hepatic cholangiocarcinoma based on the biopsy pathology and absence of evidence of another malignancy.  I discussed intra-hepatic cholangiocarcinoma, its biology, and natural history.  The best outcomes are achieved with complete surgical resection when possible.  Unfortunately, the patient is not a surgical candidate and won't ever be a surgical candidate given the stage IV disease.  I had a discussion that in her case, there are multiple reasons that she is not a surgical candidate.  The first is that she appears to have clear spread to retroperitoneal lymph nodes, which constitute stage IV disease that cannot be cured with surgery, or improved in terms of survival outcomes.  Additionally, the tumor in her caudate lobe vs. bairon-portal lymph nodes is completely encasing a significant length of her main portal vein up to the hilum and causing partial obstruction with resultant venous collateral development and likely her findings on EGD.  As such, it cannot be resected for that reason either.  She was in understanding.  I discussed that her cancer is not generally considered curable, but that there are treatment options including chemotherapy and radiation, which Dr. Fraser will manage moving forward.  She had the opportunity to ask questions and all questions were answered.  She was in agreement with and understanding of the plan.  She will call with any questions or concerns, but will otherwise return PRN.     Total time spent with the patient was 30 minutes, of which more than half was counseling and coordination of care.

## 2020-08-14 NOTE — PROGRESS NOTES
"Oncology Rooming Note    August 14, 2020 3:15 PM   Joesph Zeng is a 49 year old female who presents for:    Chief Complaint   Patient presents with     Oncology Clinic Visit     follow up     Initial Vitals: /76   Pulse 78   Temp 98  F (36.7  C) (Oral)   Resp 16   Wt 104.9 kg (231 lb 3.2 oz)   LMP  (LMP Unknown)   SpO2 95%   BMI 39.69 kg/m   Estimated body mass index is 39.69 kg/m  as calculated from the following:    Height as of 8/7/20: 1.626 m (5' 4\").    Weight as of this encounter: 104.9 kg (231 lb 3.2 oz). Body surface area is 2.18 meters squared.  Mild Pain (3) Comment: Data Unavailable   No LMP recorded (lmp unknown). Patient is postmenopausal.  Allergies reviewed: Yes  Medications reviewed: Yes    Medications: Medication refills not needed today.  Pharmacy name entered into [facility]:    [id number] - [place], [place] - [address].      Vilma Love RN  "

## 2020-08-14 NOTE — NURSING NOTE
"Oncology Rooming Note    August 14, 2020 11:14 AM   Joesph Zeng is a 49 year old female who presents for:    Chief Complaint   Patient presents with     New Patient     Primary malignant neoplasm of biliary tract (H)     Initial Vitals: /85   Pulse 87   Temp 98.1  F (36.7  C)   Wt 105.5 kg (232 lb 9.6 oz)   LMP  (LMP Unknown)   SpO2 98%   BMI 39.93 kg/m   Estimated body mass index is 39.93 kg/m  as calculated from the following:    Height as of 8/7/20: 1.626 m (5' 4\").    Weight as of this encounter: 105.5 kg (232 lb 9.6 oz). Body surface area is 2.18 meters squared.  Mild Pain (3) Comment: Data Unavailable   No LMP recorded (lmp unknown). Patient is postmenopausal.  Allergies reviewed: Yes  Medications reviewed: Yes    Medications: Medication refills not needed today.  Pharmacy name entered into [facility]:    [id number] - [place], [place] - [address] & [address]  [id number] - [place], [place] - [address].    Clinical concerns: Discomfort and pain in back for 1x week. Dr. White was notified.      Ramya Ibarra MA            "

## 2020-08-14 NOTE — PROGRESS NOTES
[facility]  MEDICAL ONCOLOGY    RETURN PATIENT VISIT NOTE    Reason for visit: Intrahepatic cholangiocarcinoma     Oncology treatment summary  1.  Began having symptoms October 2019 with abdominal bloating and constipation with nausea.  Eventually she had a 30 pound weight loss with increasing GI symptoms  2.  Abdominal ultrasound 7/10/2020 with a large mass in the upper abdomen along with a large liver mass  3.  CT abdomen pelvis performed 7/13/2020 with a large hypoattenuating mass in the inferior right lobe of the liver (segment 6) measuring up to 6.5 cm.  Additionally there was a mass in the caudate lobe of the liver which appeared to surround the portal vein.  This measures 6 cm.  There was a large hypoattenuating necrotic lymph node in the aortocaval region measuring 3.5 cm along with other retroperitoneal adenopathy seen posterior to the left renal vein measuring up to 2.6 cm     HISTORY OF PRESENTING ILLNESS  Patient is here for a face-to-face visit today.  She did see the surgeon this morning and is somewhat down after that visit.  She is here today with her fiancé.  She states she is continuing to have intermittent nausea and finds it difficult to eat.  She is losing around 8 to 10 pounds a week due to poor intake.  That she notes after she eats she feels safest and her esophagus is uncomfortable and therefore she has been home not eating much.  Additionally she has been taking her antidepressants somewhat sporadically given that it is uncomfortable to take her pills even.  She does not feel she is anything for pain she did have pain earlier in the week but once we sent a prescription for oxycodone and this subsequently resolved and she has not needed any pain meds.  No constipation or diarrhea.  She is fatigued and tired.  She is already scheduled for a Port-A-Cath to be done on 8/17/2020.         PAST MEDICAL HISTORY  Depression, PTSD, cholecystectomy      CURRENT OUTPATIENT  MEDICATIONS  Current Outpatient Medications   Medication     diazepam (VALIUM) 5 MG tablet     docusate sodium (COLACE) 100 MG capsule     FLUoxetine 20 MG tablet     mirtazapine (REMERON) 15 MG tablet     ondansetron (ZOFRAN-ODT) 4 MG ODT tab     oxyCODONE (ROXICODONE) 5 MG tablet     polyethylene glycol (MIRALAX) powder     risperiDONE (RISPERDAL) 1 MG tablet     No current facility-administered medications for this visit.         ALLERGIES   No Known Allergies     SOCIAL HISTORY  No tobacco use, she is single and has a fiancé.  She lives in [place].  She has 7 children.  She works at home.  No tobacco or excessive alcohol use.     FAMILY HISTORY  No malignancies in her family history that she is aware of     REVIEW OF SYSTEMS  Review Of Systems  Skin: negative  Eyes: negative  Ears/Nose/Throat: negative  Respiratory: No shortness of breath, dyspnea on exertion, cough, or hemoptysis  Cardiovascular: negative  Gastrointestinal: negative per HPI  Genitourinary: negative  Musculoskeletal: negative  Neurologic: negative  Psychiatric: negative  Hematologic/Lymphatic/Immunologic: negative  Endocrine: negative      PHYSICAL EXAM  Physical Exam  Constitutional:       Appearance: Normal appearance.   HENT:      Head: Normocephalic and atraumatic.      Nose: Nose normal.   Eyes:      Extraocular Movements: Extraocular movements intact.      Conjunctiva/sclera: Conjunctivae normal.      Pupils: Pupils are equal, round, and reactive to light.   Neck:      Musculoskeletal: Normal range of motion and neck supple.   Cardiovascular:      Rate and Rhythm: Normal rate and regular rhythm.   Pulmonary:      Effort: Pulmonary effort is normal.      Breath sounds: Normal breath sounds.   Abdominal:      General: Abdomen is flat. Bowel sounds are normal.      Palpations: Abdomen is soft.   Musculoskeletal: Normal range of motion.   Skin:     General: Skin is warm and dry.   Neurological:      General: No focal deficit present.       Mental Status: She is alert and oriented to person, place, and time. Mental status is at baseline.   Psychiatric:         Mood and Affect: Mood normal.         Behavior: Behavior normal.         Thought Content: Thought content normal.         Judgment: Judgment normal.       .        LABORATORY AND IMAGING STUDIES  Recent Labs   Lab Test 07/02/20  1402 03/05/20  1102    136   POTASSIUM 3.9 3.9   CHLORIDE 102 103   CO2 29 26   ANIONGAP 7 7   BUN 10 15   CR 0.80 0.63   GLC 97 126*   IMELDA 9.0 8.8     No results for input(s): MAG, PHOS in the last 86147 hours.  Recent Labs   Lab Test 07/02/20  1402 03/05/20  1102   WBC 5.9 6.1   HGB 15.0 15.2   * 194   MCV 90 90   NEUTROPHIL 61.4 59.4     Recent Labs   Lab Test 07/02/20  1402 03/05/20  1102   BILITOTAL 0.7 0.8   ALKPHOS 110 114   ALT 43 89*   AST 45 90*   ALBUMIN 3.3* 3.3*     TSH   Date Value Ref Range Status   03/05/2020 3.20 0.40 - 4.00 mU/L Final     No results for input(s): CEA in the last 55892 hours.  Results for orders placed or performed during the hospital encounter of 07/13/20   CT Abdomen Pelvis w Contrast    Narrative    CT ABDOMEN PELVIS WITH CONTRAST  7/13/2020 3:57 PM    HISTORY: Large abdominal mass seen on ultrasound 7/10/20, concern is  for metastasis. Abdominal pain, unspecified abdominal location.    TECHNIQUE: Scans obtained from the diaphragm through the pelvis with  IV contrast, Isovue 370, 100mL.   Radiation dose for this scan was reduced using automated exposure  control, adjustment of the mA and/or kV according to patient size, or  iterative reconstruction technique.    COMPARISON:  Limited abdominal ultrasound dated 7/10/2020.    FINDINGS: Visualized portions of the lung bases and mediastinal  contents are grossly unremarkable. No aggressive osseous lesions or  acute osseous fractures are seen. There are degenerative changes in  the spine. Sclerosis at the left SI joint could represent chronic  sacral ileitis versus degenerative  change.    There is a large hypoattenuating mass in the inferior right lobe of  the liver (segment 6) measuring up to 6.5 cm in diameter. Additionally  there is a mass in the caudate lobe of the liver which appears to  surround the portal vein. This measures up to 6.0 cm in diameter.  There is a large hypoattenuating necrotic lymph node in the aortocaval  region (image 33 series 2) measuring up to 3.5 cm in short axis. Other  retroperitoneal lymphadenopathy seen posterior to the left renal vein  measuring up to 2.6 cm in short axis. The liver, pancreas, spleen,  bilateral adrenal glands, and bilateral kidneys otherwise enhance  normally. No hydronephrosis, nephrolithiasis, hydroureter or ureteral  calculus is identified. Urinary bladder is mostly decompressed but is  otherwise unremarkable.    No other significant lymphadenopathy is seen in the abdomen or pelvis.  No free fluid or free air is seen in the peritoneal cavity. Uterus and  ovaries are grossly unremarkable.    The colon is of normal caliber without pericolonic inflammatory change  to suggest acute diverticulitis. Appendix is not well seen. No obvious  pericecal inflammatory changes are seen. Small bowel is grossly of  normal caliber. Stomach contains fluid and air. There is thickening of  the wall of the gastric antrum/pyloric region which is likely normal  for this patient. A neoplastic process in this region is considered  less likely, but difficult to exclude.      Impression    IMPRESSION:  1. Large mass at the caudate lobe of the liver surrounding the portal  vein and there is an associated mass in the inferior right lobe of the  liver and significant necrotic lymphadenopathy in the retroperitoneum.  This is most consistent with malignancy. This could represent primary  hepatic origin, but could also represent metastasis although no other  primary lesions are seen.  2. Mild prominence of the wall of the gastric antrum/pylorus likely  represents a  normal finding in this patient. Neoplastic infiltration  is considered less likely.    SAMANTHA TEJADA MD     US liver  HISTORY:    Right upper quadrant pain. Postprandial abdominal pain.     FINDINGS:    The liver measures a craniocaudal dimension of 18.9 cm.  There is a hypoechoic mass in the inferior right lobe of the liver  with internal blood flow measuring approximately 9.2 x 8.3 x 5.7 cm.  There is an irregular hypoechoic mass posterior to the left lobe of  the liver, of uncertain origin, possibly pancreatic head or caudate  lobe, measuring 4.5 x 7.2 x 6.5 cm. Liver echogenicity is normal. The  spleen measures 15.6 cm. The gallbladder has been removed. The  diameter of the common bile duct measures 4mm. The pancreas is  partially obscured but otherwise appears unremarkable. The aorta and  IVC are visualized. The right and left kidneys measure 11.8cm and 12.9  cm , respectively. No solid renal mass, stone or hydronephrosis.                                                                      IMPRESSION:      1. Suspicious large mass in the upper abdomen and large liver mass,  concerning for metastatic malignancy. Recommend CT of the abdomen and  pelvis with contrast for further evaluation.  2. Cholecystectomy.         EVY GIRON MD           ASSESSMENT  AND RECOMMENDATIONS:  1.  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  History of hepatitis B   3.  Depression    Plan    I discussed with the patient and her fiancé who is with her regarding her diagnosis prognosis and options for therapy.  They are somewhat overwhelmed by the discussion in general along with a discussion with surgery today.  They do understand that therapy will be palliative and not curative in nature.  She does understand she is not a surgical candidate given the extensive stage adenopathy especially in the retroperitoneum.  We discussed palliative therapy with chemotherapy and I  reviewed Gemzar and cisplatin.  We reviewed the mechanism of treatment and side effect panel they are in.  She was additionally meeting with the nurse after me to discuss chemotherapy.  She is already scheduled for port and I will send a emla prescription.  Additionally I will send antiemetics for her.  We have requested MMR testing which was proficient but will also be requiring NGS testing.  Will be sent soon.  Otherwise she will plan to start chemotherapy next week on Tuesday and will see me again for the start of cycle 2.    Laurie Fraser MD on 8/14/2020 at 4:12 PM

## 2020-08-14 NOTE — LETTER
8/14/2020         RE: Joesph Zeng  [address]  [facility] [id number]        Dear Colleague,    Thank you for referring your patient, Joesph Zeng, to [facility]. Please see a copy of my visit note below.    [facility] - Surgical Oncology    NEW CONSULTATION  Aug 14, 2020    Reason for Visit:    Joesph Zeng is a 49 year old female with likely intra-hepatic cholangiocarcinoma.  She was referred by Dr Fraser.    Pertinent Oncologic History: 49 F w/ likely intra-hepatic cholangiocarcinoma.  She had symptoms of abdominal bloating and nausea beginning in late 2019 that subsequently evolved into increasing fatigue, issues with eating, and a 30 pound+ weight loss that was unintended.  Abdominal US 7/10/2020 showed a suspicious large upper abdominal mass and liver mass.  She underwent contrast enhanced CTAP 7/13/2020 that showed a 6.5 cm segment 6 mass, 6 cm caudate mass vs. bairon-portal lymph node, a likely 3.5 cm necrotic aortocaval lymph node, and an additional 2.6 cm lymph node near the left renal vein.  There was also non-specific distal gastric thickening.  CT guided biopsy of the segment 6 mass on 7/31/2020 showed adenocarcinoma and EBV positivity consistent with EBV-associated intra-hepatic cholangiocarcinoma.  EGD with biopsies on 8/3/2020 demonstrated grade II esophageal varices and erosive gastritis with no evidence for malignancy.  The patient now presents for recommendations on management.    Pertinent Exam: Abdomen soft, non-tender, and non-distended.  No jaundice or scleral icterus.    HISTORY OF PRESENT ILLNESS: The patient is feeling relatively well, but continues to have some GI issues related to nausea and bloating.  Starting to have some back pain.  Hepatitis B carrier, depression, PTSD.  History of cholecystectomy.  No blood thinning medications.    Pertinent Work-Up/Findings:    Labs: LFTs 7/2/2020 were normal except albumin 3.3.  CBC 7/31/2020  normal w/ exception of platelets = 119.  INR 7/31/2020 =  1.19.  HBV virus titre positive.    Abdominal US (7/10/2020): IMPRESSION:      1. Suspicious large mass in the upper abdomen and large liver mass, concerning for metastatic malignancy. Recommend CT of the abdomen and pelvis with contrast for further evaluation.  2. Cholecystectomy.    CTAP (7/13/2020): IMPRESSION:  1. Large mass at the caudate lobe of the liver surrounding the portal vein and there is an associated mass in the inferior right lobe of the liver and significant necrotic lymphadenopathy in the retroperitoneum.  This is most consistent with malignancy. This could represent primary hepatic origin, but could also represent metastasis although no other primary lesions are seen.  2. Mild prominence of the wall of the gastric antrum/pylorus likely represents a normal finding in this patient. Neoplastic infiltration is considered less likely.    CT Chest (7/24/2020): IMPRESSION:   1. No focal airspace opacity. Scattered right calcified pulmonary granulomas. No suspicious pulmonary nodules.  2. Redemonstration of heterogeneous hypodense right hepatic mass measuring up to 6.5 cm which appears to abut the patent common hepatic artery, better appreciated on CT abdomen and pelvis 7/13/2020.    EGD (8/3/2020): IMPRESSION:            - Grade II esophageal varices.   - Type 2 gastroesophageal varices (GOV2, esophageal varices which extend along the fundus), without bleeding.   - Varices likely related to underlying liver masses with portal compression although cannot rule out underlying liver cirrhosis/fibrosis. Given absence of bleeding history, varices not treated.   - Erosive gastritis. Biopsied to rule out H. pylori.  Likely source of CT findings and potentially some of her symptoms.   - Normal examined duodenum.     Biopsy (7/31/2020):   SPECIMEN(S):   Liver lesion biopsy, CT guided     FINAL DIAGNOSIS:   Liver, CT Guided Needle Biopsy of Right Lobe Mass:    Adenocarcinoma, moderately differentiated:    - EBV-positive admixed with rich lymphoplasmacytic infiltrate.    - Compatible with EBV-associated intrahepatic cholangiocarcinoma.  (See Comment)    Assessment/Counseling/Plan:    Joesph Zeng is a 49 year old female with likely stage IV intra-hepatic cholangiocarcinoma based on the biopsy pathology and absence of evidence of another malignancy.  I discussed intra-hepatic cholangiocarcinoma, its biology, and natural history.  The best outcomes are achieved with complete surgical resection when possible.  Unfortunately, the patient is not a surgical candidate and won't ever be a surgical candidate given the stage IV disease.  I had a discussion that in her case, there are multiple reasons that she is not a surgical candidate.  The first is that she appears to have clear spread to retroperitoneal lymph nodes, which constitute stage IV disease that cannot be cured with surgery, or improved in terms of survival outcomes.  Additionally, the tumor in her caudate lobe vs. bairon-portal lymph nodes is completely encasing a significant length of her main portal vein up to the hilum and causing partial obstruction with resultant venous collateral development and likely her findings on EGD.  As such, it cannot be resected for that reason either.  She was in understanding.  I discussed that her cancer is not generally considered curable, but that there are treatment options including chemotherapy and radiation, which Dr. Fraser will manage moving forward.  She had the opportunity to ask questions and all questions were answered.  She was in agreement with and understanding of the plan.  She will call with any questions or concerns, but will otherwise return PRN.     Total time spent with the patient was 30 minutes, of which more than half was counseling and coordination of care.    Again, thank you for allowing me to participate in the care of your patient.         Sincerely,        Jong White MD

## 2020-08-14 NOTE — LETTER
8/14/2020         RE: Joesph Zeng  [address]  [facility] [address]        Dear Colleague,    Thank you for referring your patient, Joesph Zeng, to the [facility]. Please see a copy of my visit note below.    [facility] PHYSICIANS  MEDICAL ONCOLOGY    RETURN PATIENT VISIT NOTE    Reason for visit: Intrahepatic cholangiocarcinoma     Oncology treatment summary  1.  Began having symptoms October 2019 with abdominal bloating and constipation with nausea.  Eventually she had a 30 pound weight loss with increasing GI symptoms  2.  Abdominal ultrasound 7/10/2020 with a large mass in the upper abdomen along with a large liver mass  3.  CT abdomen pelvis performed 7/13/2020 with a large hypoattenuating mass in the inferior right lobe of the liver (segment 6) measuring up to 6.5 cm.  Additionally there was a mass in the caudate lobe of the liver which appeared to surround the portal vein.  This measures 6 cm.  There was a large hypoattenuating necrotic lymph node in the aortocaval region measuring 3.5 cm along with other retroperitoneal adenopathy seen posterior to the left renal vein measuring up to 2.6 cm     HISTORY OF PRESENTING ILLNESS  Patient is here for a face-to-face visit today.  She did see the surgeon this morning and is somewhat down after that visit.  She is here today with her fiancé.  She states she is continuing to have intermittent nausea and finds it difficult to eat.  She is losing around 8 to 10 pounds a week due to poor intake.  That she notes after she eats she feels safest and her esophagus is uncomfortable and therefore she has been home not eating much.  Additionally she has been taking her antidepressants somewhat sporadically given that it is uncomfortable to take her pills even.  She does not feel she is anything for pain she did have pain earlier in the week but once we sent a prescription for oxycodone and this subsequently resolved and she has not  needed any pain meds.  No constipation or diarrhea.  She is fatigued and tired.  She is already scheduled for a Port-A-Cath to be done on 8/17/2020.         PAST MEDICAL HISTORY  Depression, PTSD, cholecystectomy      CURRENT OUTPATIENT MEDICATIONS  Current Outpatient Medications   Medication     diazepam (VALIUM) 5 MG tablet     docusate sodium (COLACE) 100 MG capsule     FLUoxetine 20 MG tablet     mirtazapine (REMERON) 15 MG tablet     ondansetron (ZOFRAN-ODT) 4 MG ODT tab     oxyCODONE (ROXICODONE) 5 MG tablet     polyethylene glycol (MIRALAX) powder     risperiDONE (RISPERDAL) 1 MG tablet     No current facility-administered medications for this visit.         ALLERGIES   No Known Allergies     SOCIAL HISTORY  No tobacco use, she is single and has a fiancé.  She lives in [place].  She has 7 children.  She works at home.  No tobacco or excessive alcohol use.     FAMILY HISTORY  No malignancies in her family history that she is aware of     REVIEW OF SYSTEMS  Review Of Systems  Skin: negative  Eyes: negative  Ears/Nose/Throat: negative  Respiratory: No shortness of breath, dyspnea on exertion, cough, or hemoptysis  Cardiovascular: negative  Gastrointestinal: negative per HPI  Genitourinary: negative  Musculoskeletal: negative  Neurologic: negative  Psychiatric: negative  Hematologic/Lymphatic/Immunologic: negative  Endocrine: negative      PHYSICAL EXAM  Physical Exam  Constitutional:       Appearance: Normal appearance.   HENT:      Head: Normocephalic and atraumatic.      Nose: Nose normal.   Eyes:      Extraocular Movements: Extraocular movements intact.      Conjunctiva/sclera: Conjunctivae normal.      Pupils: Pupils are equal, round, and reactive to light.   Neck:      Musculoskeletal: Normal range of motion and neck supple.   Cardiovascular:      Rate and Rhythm: Normal rate and regular rhythm.   Pulmonary:      Effort: Pulmonary effort is normal.      Breath sounds: Normal breath sounds.   Abdominal:       General: Abdomen is flat. Bowel sounds are normal.      Palpations: Abdomen is soft.   Musculoskeletal: Normal range of motion.   Skin:     General: Skin is warm and dry.   Neurological:      General: No focal deficit present.      Mental Status: She is alert and oriented to person, place, and time. Mental status is at baseline.   Psychiatric:         Mood and Affect: Mood normal.         Behavior: Behavior normal.         Thought Content: Thought content normal.         Judgment: Judgment normal.       .        LABORATORY AND IMAGING STUDIES  Recent Labs   Lab Test 07/02/20  1402 03/05/20  1102    136   POTASSIUM 3.9 3.9   CHLORIDE 102 103   CO2 29 26   ANIONGAP 7 7   BUN 10 15   CR 0.80 0.63   GLC 97 126*   IMELDA 9.0 8.8     No results for input(s): MAG, PHOS in the last 75106 hours.  Recent Labs   Lab Test 07/02/20  1402 03/05/20  1102   WBC 5.9 6.1   HGB 15.0 15.2   * 194   MCV 90 90   NEUTROPHIL 61.4 59.4     Recent Labs   Lab Test 07/02/20  1402 03/05/20  1102   BILITOTAL 0.7 0.8   ALKPHOS 110 114   ALT 43 89*   AST 45 90*   ALBUMIN 3.3* 3.3*     TSH   Date Value Ref Range Status   03/05/2020 3.20 0.40 - 4.00 mU/L Final     No results for input(s): CEA in the last 06454 hours.  Results for orders placed or performed during the hospital encounter of 07/13/20   CT Abdomen Pelvis w Contrast    Narrative    CT ABDOMEN PELVIS WITH CONTRAST  7/13/2020 3:57 PM    HISTORY: Large abdominal mass seen on ultrasound 7/10/20, concern is  for metastasis. Abdominal pain, unspecified abdominal location.    TECHNIQUE: Scans obtained from the diaphragm through the pelvis with  IV contrast, Isovue 370, 100mL.   Radiation dose for this scan was reduced using automated exposure  control, adjustment of the mA and/or kV according to patient size, or  iterative reconstruction technique.    COMPARISON:  Limited abdominal ultrasound dated 7/10/2020.    FINDINGS: Visualized portions of the lung bases and  mediastinal  contents are grossly unremarkable. No aggressive osseous lesions or  acute osseous fractures are seen. There are degenerative changes in  the spine. Sclerosis at the left SI joint could represent chronic  sacral ileitis versus degenerative change.    There is a large hypoattenuating mass in the inferior right lobe of  the liver (segment 6) measuring up to 6.5 cm in diameter. Additionally  there is a mass in the caudate lobe of the liver which appears to  surround the portal vein. This measures up to 6.0 cm in diameter.  There is a large hypoattenuating necrotic lymph node in the aortocaval  region (image 33 series 2) measuring up to 3.5 cm in short axis. Other  retroperitoneal lymphadenopathy seen posterior to the left renal vein  measuring up to 2.6 cm in short axis. The liver, pancreas, spleen,  bilateral adrenal glands, and bilateral kidneys otherwise enhance  normally. No hydronephrosis, nephrolithiasis, hydroureter or ureteral  calculus is identified. Urinary bladder is mostly decompressed but is  otherwise unremarkable.    No other significant lymphadenopathy is seen in the abdomen or pelvis.  No free fluid or free air is seen in the peritoneal cavity. Uterus and  ovaries are grossly unremarkable.    The colon is of normal caliber without pericolonic inflammatory change  to suggest acute diverticulitis. Appendix is not well seen. No obvious  pericecal inflammatory changes are seen. Small bowel is grossly of  normal caliber. Stomach contains fluid and air. There is thickening of  the wall of the gastric antrum/pyloric region which is likely normal  for this patient. A neoplastic process in this region is considered  less likely, but difficult to exclude.      Impression    IMPRESSION:  1. Large mass at the caudate lobe of the liver surrounding the portal  vein and there is an associated mass in the inferior right lobe of the  liver and significant necrotic lymphadenopathy in the  retroperitoneum.  This is most consistent with malignancy. This could represent primary  hepatic origin, but could also represent metastasis although no other  primary lesions are seen.  2. Mild prominence of the wall of the gastric antrum/pylorus likely  represents a normal finding in this patient. Neoplastic infiltration  is considered less likely.    SAMANTHA TEJADA MD     US liver  HISTORY:    Right upper quadrant pain. Postprandial abdominal pain.     FINDINGS:    The liver measures a craniocaudal dimension of 18.9 cm.  There is a hypoechoic mass in the inferior right lobe of the liver  with internal blood flow measuring approximately 9.2 x 8.3 x 5.7 cm.  There is an irregular hypoechoic mass posterior to the left lobe of  the liver, of uncertain origin, possibly pancreatic head or caudate  lobe, measuring 4.5 x 7.2 x 6.5 cm. Liver echogenicity is normal. The  spleen measures 15.6 cm. The gallbladder has been removed. The  diameter of the common bile duct measures 4mm. The pancreas is  partially obscured but otherwise appears unremarkable. The aorta and  IVC are visualized. The right and left kidneys measure 11.8cm and 12.9  cm , respectively. No solid renal mass, stone or hydronephrosis.                                                                      IMPRESSION:      1. Suspicious large mass in the upper abdomen and large liver mass,  concerning for metastatic malignancy. Recommend CT of the abdomen and  pelvis with contrast for further evaluation.  2. Cholecystectomy.         EVY GIRON MD           ASSESSMENT  AND RECOMMENDATIONS:  1.  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  History of hepatitis B   3.  Depression    Plan    I discussed with the patient and her fiancé who is with her regarding her diagnosis prognosis and options for therapy.  They are somewhat overwhelmed by the discussion in general along with a discussion with surgery today.   "They do understand that therapy will be palliative and not curative in nature.  She does understand she is not a surgical candidate given the extensive stage adenopathy especially in the retroperitoneum.  We discussed palliative therapy with chemotherapy and I reviewed Gemzar and cisplatin.  We reviewed the mechanism of treatment and side effect panel they are in.  She was additionally meeting with the nurse after me to discuss chemotherapy.  She is already scheduled for port and I will send a emla prescription.  Additionally I will send antiemetics for her.  We have requested MMR testing which was proficient but will also be requiring NGS testing.  Will be sent soon.  Otherwise she will plan to start chemotherapy next week on Tuesday and will see me again for the start of cycle 2.    Laurie Fraser MD on 8/14/2020 at 4:12 PM        Oncology Rooming Note    August 14, 2020 3:15 PM   Joesph Zeng is a 49 year old female who presents for:    Chief Complaint   Patient presents with     Oncology Clinic Visit     follow up     Initial Vitals: /76   Pulse 78   Temp 98  F (36.7  C) (Oral)   Resp 16   Wt 104.9 kg (231 lb 3.2 oz)   LMP  (LMP Unknown)   SpO2 95%   BMI 39.69 kg/m   Estimated body mass index is 39.69 kg/m  as calculated from the following:    Height as of 8/7/20: 1.626 m (5' 4\").    Weight as of this encounter: 104.9 kg (231 lb 3.2 oz). Body surface area is 2.18 meters squared.  Mild Pain (3) Comment: Data Unavailable   No LMP recorded (lmp unknown). Patient is postmenopausal.  Allergies reviewed: Yes  Medications reviewed: Yes    Medications: Medication refills not needed today.  Pharmacy name entered into EPIC:    [id number] - [facility], [place] - [address]      Vimla Love RN    Again, thank you for allowing me to participate in the care of your patient.        Sincerely,        Laurie Fraser MD    "

## 2020-08-17 NOTE — PROGRESS NOTES
"[facility]: Chemotherapy Education Notes    RN met with patient and significant other in clinic for chemotherapy education on Cisplatin and Gemzar. [employer] handouts were discussed and given to patient to take home.  Reviewed the following with the patient and their support person:     Treatment Goal/Regimen/Duration: rationale for strict adherence, specific medication names and medication delivery methods; possible chemotherapy side effects and management of side effects, including: skin changes/nail changes, anemia, neutropenia, thrombocytopenia, diarrhea/constipation, nausea/vomiting, hair loss, memory changes, mouth sores, taste changes, appetite changes, peripheral neuropathy, fatigue, myelosuppression, kidney problems, and ringing in the ears.  Infection prevention, and monitoring of lab values, what lab tests and what changes of these values meant, along with the possibility of IV hydration or blood product transfusion, or the need to defer or hold treatment.  Also reviewed signs/symptoms that should be reported to care team or on-call provider immediately, including: temperature of 100.4 degrees or higher, shortness of breath, chest pain, unusual bruising, bleeding symptoms, extreme fatigue, >4-6 episodes of diarrhea in 24 hours, uncontrolled nausea/vomiting, symptoms of dehydration (severe dry mouth/severe thirst, rapid heart beat, dizziness, dark or decreased urination, and lethargy), signs of an allergic reaction, or symptoms of DVT (sudden onset redness, swelling, tenderness, and warmth of extremity).      Written Information: Patient was provided  \"Getting Ready for Chemotherapy: What to Expect, Before, During, and After your Treatment,\" printouts from [employer] on specific chemotherapy drugs, \"Eating Hints: Before, During, and After Cancer Treatment,\" printouts on possible chemotherapy side effects/ways to cope with side effects, \"When to Call the Doctor,\" and \"Self-Care Tips During Cancer " "Treatment.\"  Also, information regarding various programs offered at [facility], and our business card with contact information given for the following: Oncology Clinic/scheduling, RN Care Coordinator, and the after-hours Nurse Advice Line.    No barriers to learning identified. Patient and significant other verbalized understanding of all written and verbal information. All questions answered patient s satisfaction.  Learning barriers and method preference are documented in the patient education flowsheet.     Patient instructed to call with further questions or concerns.  Patient states understanding and is in agreement with this plan.     Kendra Huitron, RN, BSN, OCN  Oncology Care Coordinator  [facility]      "

## 2020-08-17 NOTE — PROGRESS NOTES
Requisition form for FGFR testing faxed to Molecular lab at [facility] @ [phone].

## 2020-08-18 NOTE — PROGRESS NOTES
SPIRITUAL HEALTH SERVICES  SPIRITUAL ASSESSMENT Progress Note  [facility]  [facility]      PRIMARY FOCUS:     Support for coping    REFERRAL SOURCE: Nursing referral     ILLNESS CIRCUMSTANCES:   Reviewed documentation. Reflective conversation shared with Joesph Zeng which integrated elements of illness and family narratives. Prayed with her for her concerns.     Context of Serious Illness/Symptom(s) - Patient shared that she received a cancer diagnosis very recently, in the past month.  She has been told that her cancer is terminal.     Resources for Support - Patient has a SO Pat of many years.  She has 7 children ages 10-32.     DISTRESS:     Emotional/Spiritual/Existential Distress - Patient talked about the distress of the initial time of waiting for a diagnosis; the unknowns and the uncertainty.     Restorationism Distress - She was concerned that her SO was not joining her in her marlyn journey but this cancer diagnosis has already had a significant impact on that.      Social/Cultural/Economic Distress - None discussed.     SPIRITUAL/Confucianist COPING:     Adventism/Marlyn - Patient reports a very strong Rastafari marlyn.  She voiced that she is at peace that God is with her and she finds great strength in that.  She also shared that her SO has recently had a change of heart and will be baptized with her at a [employer] in [place] this coming weekend.  They have met with the  and feel very supported.     Spiritual Practice(s) - Prays to her Heavenly Father.      Emotional/Relational/Existential Connections - Has talked with each of her children about her diagnosis.  They have all been supportive as have friends and neighbors.     GOALS OF CARE:    Goals of Care - To be at peace and to have peace for her family.     Meaning/Sense-Making - Family and marlyn are very important to her.     PLAN: Patient knows that she can request a Spiritual Health encounter as needed. She asked that  I continue to visit with her and offer spiritual support.     Kelly Verde  Staff   [phone]

## 2020-08-18 NOTE — TELEPHONE ENCOUNTER
The patient called to give me permission to give verbal information regarding her medical diagnosis to the [facility] for purposes related to getting her son to come home and help her from the [place].  I discussed that I would discuss this with the [facility].

## 2020-08-18 NOTE — PROGRESS NOTES
Infusion Nursing Note:  Joesph Zeng presents today for C1,D1 of Cisplatin/Gemzar  Patient seen by provider today: No   present during visit today: Not Applicable.    Note:Pt oriented to the [facility] - plan of care, hydration encouraged, premeds and home antinausea meds. Pt states she already picked up her home meds. Pt states he boyfriend will be driving her home.    Intravenous Access:  Implanted Port.    Treatment Conditions:  Lab Results   Component Value Date    HGB 14.9 08/14/2020     Lab Results   Component Value Date    WBC 6.2 08/14/2020      Lab Results   Component Value Date    ANEU 4.3 08/14/2020     Lab Results   Component Value Date     08/14/2020      Lab Results   Component Value Date     08/14/2020                   Lab Results   Component Value Date    POTASSIUM 3.9 08/14/2020           No results found for: MAG         Lab Results   Component Value Date    CR 0.74 08/14/2020                   Lab Results   Component Value Date    IMELDA 9.2 08/14/2020                Lab Results   Component Value Date    BILITOTAL 1.3 08/14/2020           Lab Results   Component Value Date    ALBUMIN 3.8 08/14/2020                    Lab Results   Component Value Date    ALT 51 08/14/2020           Lab Results   Component Value Date    AST 58 08/14/2020       Results reviewed, labs MET treatment parameters, ok to proceed with treatment.      Post Infusion Assessment:  Patient tolerated infusion without incident.  Blood return noted pre and post infusion.  Site patent and intact, free from redness, edema or discomfort.  No evidence of extravasations.  Access discontinued per protocol.       Discharge Plan:   Return C1,D8 on 08/25/2020 for C1,D8 of Cisplatin/Gemzar  Discharge instructions reviewed with: Patient.  Patient and/or family verbalized understanding of discharge instructions and all questions answered.  Patient discharged in stable condition accompanied by: self.  Departure Mode:  Ambulatory.    Olga Ge, RN

## 2020-08-19 NOTE — PROGRESS NOTES
Reached out to patient regarding a request to contact [facility] so her son can come home from the  to be with her, however, Dr. White had already took care of this.  First cycle of Gemcitabine/Cisplatin given yesterday.    Joesph also is requesting a refill on Oxycodone; she was going through her old prescription bottles and accidentally through out the bottle of pills she had - prescribed by Dr. Fraser on 8/10.  She states she got somewhat confused and threw out the wrong pills; she was trying to clear the counter and make room for new prescriptions.  she was very fatigued today - panting somewhat on the telephone - she states her laxatives were working and she feels this wiped her out.  She has 4 pills remaining- of some pain medication she received from [facility] following her PORT placement on Monday.  Advised will reach out to provider for request of refill.

## 2020-08-20 NOTE — PROGRESS NOTES
[facility] Internal Procedure Intake form:    Referring/Requesting provider: Evelio Ugalde contact - oncology    Procedure Requested: EUS with celiac block    Requested provider (if specified): none    Specific method of sedation requested: MAC    Indication/Reason for procedure: cholangiocarcinoma    Requested urgency of procedure: tier 2    Did referring provider place Gastroenterology procedure referral in EPIC - Yes   Is patient is aware of request for procedure and ok to be contacted to schedule? Yes  Per Dr. Fraser    Reviewed by Dr. Barragan  - ok to schedule for endoscopy unit procedure.  Orders placed and sent to scheduling.     Yoko Gusman RN   BSN, HNBC, STAR-T  [facility]  Care Coordinator  Ph: [phone]  FAX: [phone]

## 2020-08-26 NOTE — PROGRESS NOTES
"Infusion Nursing Note:  Joesph Zeng presents today for ***.    Patient seen by provider today: {YES (EXPLAIN)/NO:618541}   present during visit today: {UMHLANGUAGE:024642}    Note: {Not Applicable or free text:312853:s:\"N/A\"}.    Intravenous Access:  {UMHIVACCESS:082052}    Treatment Conditions:  {UMHTXCONDITIONS:788716}      Post Infusion Assessment:  {UMHPOSTINFUSION:981298}       Discharge Plan:   {UMHDISCHARGE:176178}    Celi Rahman RN                        "

## 2020-08-26 NOTE — PROGRESS NOTES
Infusion Nursing Note:  Joesph Zeng presents today for cisplatin/gemzar (HELD).    Patient seen by provider today: No   present during visit today: Not Applicable.    Note: Talked to [facility] NP, Cyndee. Pt did not meet parameters. Defer chemo until next week. Patient scheduled for labs at [place] next week and at [facility] for treatment. Future appts mailed to patient.    Intravenous Access:  Labs drawn without difficulty.  Implanted Port.    Treatment Conditions:  Lab Results   Component Value Date    HGB 13.4 08/26/2020     Lab Results   Component Value Date    WBC 4.0 08/26/2020      Lab Results   Component Value Date    ANEU 2.4 08/26/2020     Lab Results   Component Value Date    PLT 65 08/26/2020      Results reviewed, labs did NOT meet treatment parameters: Platelets 65-defer to next week per MG NP.      Post Infusion Assessment:  Patient tolerated infusion without incident.  Blood return noted pre and post infusion.  Site patent and intact, free from redness, edema or discomfort.  No evidence of extravasations.  Access discontinued per protocol.       Discharge Plan:   Discharge instructions reviewed with: Patient.  Patient and/or family verbalized understanding of discharge instructions and all questions answered.  Patient discharged in stable condition accompanied by: self.  Departure Mode: Ambulatory.    Celi Rahman RN

## 2020-08-27 NOTE — PROGRESS NOTES
Left voicemail message with patient regarding how she was feeling today.  Patient was not able to receive chemotherapy yesterday due to low platelets.

## 2020-09-02 NOTE — PROGRESS NOTES
Infusion Nursing Note:  Joesph Zeng presents today for C1D8 cisplat/gemzar  Patient seen by provider today: No   present during visit today: Not Applicable.    Note: N/A.    Intravenous Access:  Labs drawn without difficulty.  Implanted Port.    Treatment Conditions:  Lab Results   Component Value Date    HGB 12.5 09/02/2020     Lab Results   Component Value Date    WBC 4.1 09/02/2020      Lab Results   Component Value Date    ANEU 2.4 09/02/2020     Lab Results   Component Value Date     09/02/2020      Lab Results   Component Value Date     09/02/2020                   Lab Results   Component Value Date    POTASSIUM 3.6 09/02/2020           Lab Results   Component Value Date    MAG 2.3 09/02/2020            Lab Results   Component Value Date    CR 0.72 09/02/2020                   Lab Results   Component Value Date    IMELDA 8.3 09/02/2020                Lab Results   Component Value Date    BILITOTAL 0.8 09/02/2020           Lab Results   Component Value Date    ALBUMIN 3.0 09/02/2020                    Lab Results   Component Value Date    ALT 80 09/02/2020           Lab Results   Component Value Date    AST 71 09/02/2020       Results reviewed, labs MET treatment parameters, ok to proceed with treatment.      Post Infusion Assessment:  Patient tolerated infusion without incident.  Site patent and intact, free from redness, edema or discomfort.  No evidence of extravasations.  Access discontinued per protocol.       Discharge Plan:   Copy of AVS reviewed with patient and/or family.  Patient will return as prev elliot for next appointment.  Patient discharged in stable condition accompanied by: self.  Departure Mode: Ambulatory.    Santos Ramirez RN

## 2020-09-02 NOTE — TELEPHONE ENCOUNTER
Patient is scheduled for eus with Dr. Barragan    Spoke with: patient    Date of Procedure: 9-15-20    Location: Unit J    Sedation Type MAC    Informed patient they will need an adult  yes    Informed Patient of COVID Test Requirement yes    Preferred Pharmacy for Pre Prescription chart    Confirmed Nurse will call to complete assessment yes    Additional comments: none

## 2020-09-08 NOTE — TELEPHONE ENCOUNTER
Received call from patient. She states she feels she is dehydrated and would like to receive IVFs. Symptoms include dry mouth, thirsty,lightheaded, weakness and dark, infrequent urination. Drinks sips of fluids. Unable to drink larger amounts due to nausea. She is taking Zofran and compazin with good relief. She would like to come in for IVF in 2 days.    Also c/o constipation. No bowel movement for 3 days. Tried senna and felt it caused cramping pain. So stopped taking. Advised she try mirilax but patient states she is unable to read the directions on how to mix and also does not think she can drink that much. She did try 2 dulcolax today. Also discussed trying prune juice. If constipation continues discussed trying milk of magnesia. Called patient a few hours later and she reports she was able to have a good bowel movement.  Infusion appointment made for 9/10/20  Laurel Rodriguez  RN, BSN, OCN

## 2020-09-10 NOTE — PROGRESS NOTES
Infusion Nursing Note:  Joesph Zeng presents today for IV fluids.    Patient seen by provider today: No   present during visit today: Not Applicable.    Note: Requested a bed due to abdominal discomfort when sitting.    Intravenous Access:  Implanted Port.    Treatment Conditions:  Not Applicable.      Post Infusion Assessment:  Patient tolerated infusion without incident.  Blood return noted pre and post infusion.  Site patent and intact, free from redness, edema or discomfort.  No evidence of extravasations.  Access discontinued per protocol.       Discharge Plan:   Patient discharged in stable condition accompanied by: self. Has a .  Departure Mode: Ambulatory.    Aimee Ryan RN

## 2020-09-18 NOTE — PROGRESS NOTES
[facility]  MEDICAL ONCOLOGY    RETURN PATIENT VISIT NOTE    Reason for visit: Intrahepatic cholangiocarcinoma     Oncology treatment summary  1.  Began having symptoms October 2019 with abdominal bloating and constipation with nausea.  Eventually she had a 30 pound weight loss with increasing GI symptoms  2.  Abdominal ultrasound 7/10/2020 with a large mass in the upper abdomen along with a large liver mass  3.  CT abdomen pelvis performed 7/13/2020 with a large hypoattenuating mass in the inferior right lobe of the liver (segment 6) measuring up to 6.5 cm.  Additionally there was a mass in the caudate lobe of the liver which appeared to surround the portal vein.  This measures 6 cm.  There was a large hypoattenuating necrotic lymph node in the aortocaval region measuring 3.5 cm along with other retroperitoneal adenopathy seen posterior to the left renal vein measuring up to 2.6 cm  4. Bx with EBV+ adenocarcinoma, intrahepatic choleangiocarcinoma. PDL! CPS 7, pMMR, NTRK-, MET-, FGFR2 pending  5. 8/18/2020 began pallaitive gemzar/cisplatin     HISTORY OF PRESENTING ILLNESS  Patient is here for a face-to-face visit today.  She is about the same, she notes her pain is about 8/10 but controlled with one oxycodone daily. I had scheduled for her a celiac block however she didn't follow through as scheduled for a pre op was difficult. She doesn't want to reschedule at this point. She is sleepy throughout our visit. She tolerated the chemotherapy pretty okay, with a little bit of nausea. She is wondering is she can get a PCA and registered for the [place] MJ program.        PAST MEDICAL HISTORY  Depression, PTSD, cholecystectomy      CURRENT OUTPATIENT MEDICATIONS  Current Outpatient Medications   Medication     diazepam (VALIUM) 5 MG tablet     docusate sodium (COLACE) 100 MG capsule     FLUoxetine 20 MG tablet     lidocaine-prilocaine (EMLA) 2.5-2.5 % external cream     LORazepam (ATIVAN) 0.5 MG tablet      mirtazapine (REMERON) 15 MG tablet     ondansetron (ZOFRAN) 8 MG tablet     ondansetron (ZOFRAN) 8 MG tablet     ondansetron (ZOFRAN-ODT) 4 MG ODT tab     oxyCODONE (ROXICODONE) 5 MG tablet     polyethylene glycol (MIRALAX) powder     prochlorperazine (COMPAZINE) 10 MG tablet     prochlorperazine (COMPAZINE) 5 MG tablet     risperiDONE (RISPERDAL) 1 MG tablet     No current facility-administered medications for this visit.      Facility-Administered Medications Ordered in Other Visits   Medication     heparin 100 UNIT/ML injection 5 mL     sodium chloride (PF) 0.9% PF flush 10-20 mL        ALLERGIES   No Known Allergies     SOCIAL HISTORY  No tobacco use, she is single and has a fiancé.  She lives in [place].  She has 7 children.  She works at home.  No tobacco or excessive alcohol use.     FAMILY HISTORY  No malignancies in her family history that she is aware of     REVIEW OF SYSTEMS  Review Of Systems  Skin: negative  Eyes: negative  Ears/Nose/Throat: negative  Respiratory: No shortness of breath, dyspnea on exertion, cough, or hemoptysis  Cardiovascular: negative  Gastrointestinal: negative per HPI  Genitourinary: negative  Musculoskeletal: negative  Neurologic: negative  Psychiatric: negative  Hematologic/Lymphatic/Immunologic: negative  Endocrine: negative      PHYSICAL EXAM  Physical Exam  Constitutional:       Appearance: Normal appearance.   HENT:      Head: Normocephalic and atraumatic.      Nose: Nose normal.   Eyes:      Extraocular Movements: Extraocular movements intact.      Conjunctiva/sclera: Conjunctivae normal.      Pupils: Pupils are equal, round, and reactive to light.   Neck:      Musculoskeletal: Normal range of motion and neck supple.   Cardiovascular:      Rate and Rhythm: Normal rate and regular rhythm.   Pulmonary:      Effort: Pulmonary effort is normal.      Breath sounds: Normal breath sounds.   Abdominal:      General: Abdomen is flat. Bowel sounds are normal.      Palpations:  Abdomen is soft.   Musculoskeletal: Normal range of motion.   Skin:     General: Skin is warm and dry.   Neurological:      General: No focal deficit present.      Mental Status: She is alert and oriented to person, place, and time. Mental status is at baseline.   Psychiatric:         Mood and Affect: Mood normal.         Behavior: Behavior normal.         Thought Content: Thought content normal.         Judgment: Judgment normal.       .        LABORATORY AND IMAGING STUDIES  Recent Labs   Lab Test 09/18/20  0740 09/02/20  0800 08/26/20  0805 08/14/20  1555 07/02/20  1402    138 137 137 138   POTASSIUM 3.3* 3.6 3.5 3.9 3.9   CHLORIDE 106 107 105 105 102   CO2 27 28 29 25 29   ANIONGAP 6 3 3 7 7   BUN 9 12 12 19 10   CR 0.55 0.72 0.77 0.74 0.80   * 109* 120* 110* 97   IMELDA 8.5 8.3* 8.8 9.2 9.0     Recent Labs   Lab Test 09/18/20  0740 09/02/20  0800 08/26/20  0805   MAG 2.1 2.3 2.3     Recent Labs   Lab Test 09/18/20  0740 09/02/20  0800 08/26/20  0805 08/14/20  1555 07/31/20  1140   WBC 4.1 4.1 4.0 6.2 4.6   HGB 12.3 12.5 13.4 14.9 13.5   PLT 83* 114* 65* 125* 115*   MCV 91 90 88 89 91   NEUTROPHIL 63.4 60.3 60.0 69.3 63.9     Recent Labs   Lab Test 09/18/20  0740 09/02/20  0800 08/26/20  0805   BILITOTAL 1.1 0.8 0.7   ALKPHOS 132 158* 124   ALT 78* 80* 129*   AST 70* 71* 68*   ALBUMIN 3.0* 3.0* 3.2*     TSH   Date Value Ref Range Status   03/05/2020 3.20 0.40 - 4.00 mU/L Final     No results for input(s): CEA in the last 06857 hours.  Results for orders placed or performed during the hospital encounter of 07/31/20   CT Liver Biopsy    Narrative    PROCEDURES 7/31/2020:  1. CT guided percutaneous biopsy.     Clinical History: Liver masses. Biopsy requested.    Comparisons: CT abdomen pelvis 7/13/2020    Staff Radiologist: MD ANNA Ruth, ARTURO VILLALTA MD, attest that I was present for all critical  portions of the procedure and was immediately available to provide  guidance and assistance during  the remainder of the procedure.    Fellow: Favian Helton MD    Resident: Aleksey Hernandez MD    Medications: The patient was placed on continuous monitoring.  Intravenous sedation was administered. 100 mcg fentanyl and 2 mg  Versed administered intravenously. Total sedation time was 25 minutes.  Vital signs and sedation monitored by nursing staff under  Interventional Radiologists supervision. The patient remained stable  throughout the procedure.    Dose: 1362 mGycm.    PROCEDURE: The patient understood the limitations, alternatives, and  risks of the procedure and requested the procedure be performed. Both  written and oral consent were obtained.    Limited pre-procedural scan performed demonstrated adequate lesion  size and position for biopsy.    The right flank was prepped and draped in the usual sterile fashion.  Ten to one volume mixture of 1% lidocaine without epinephrine was used  for local anesthesia.     Under CT guidance, a 17 gauge coaxial introducer needle was advanced  into the right hepatic lobe tumor. Six 18 gauge core biopsies were  obtained with a i-nexus biopsy gun and submitted to Pathology.    Good hemostasis was achieved. Sterile dressing applied.    Limited post-procedural scan demonstrated no immediate complication.       Impression    IMPRESSION:   1. Six 18 gauge core biopsies obtained of the right hepatic lobe tumor  under CT-guidance.    I have personally reviewed the examination and initial interpretation  and I agree with the findings.    ARTURO VILLALTA MD            ASSESSMENT  AND RECOMMENDATIONS:  1.  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  History of hepatitis B   3.  Depression  4.  Chemotherapy induced thrombocytopenia    Plan  1. Continue palliative gemzar/cisplatin, will change dosing to q o week given thrombocytopenia, we will plan follow up imaging at the end of October to early November  2. FGFR2 testing was not done on  NGS, will follow through on having that done  3.  to see  4. Refilled meds  5. She had a low viral titre prior to starting chemotherapy and I do want her to see ID about that.  6. See me in 2 weeks

## 2020-09-18 NOTE — PROGRESS NOTES
Social Work Intervention  [facility]    Data/Intervention:    Patient Name:  Joesph Zeng  /Age:  1970 (50 year old)    Visit Type: in person  Referral Source: Dr. Fraser  Reason for Referral:  Resource support    Collaborated With:    Dr. Fraser, RNCC Joesph Stanford    Patient Concerns/Issues:   Joesph is here for C2D1 Cisplatin/Gemzar.  She is requesting support in accessing PCA services.       Intervention/Education/Resources Provided:  SW met with Joesph in the infusion center this morning. Joesph was pleasant, though sleepy. She states she is just very tired, but doing ok. Joesph states that she is having a hard time keeping up with everything at home due to this ongoing fatigue. She is looking for in home support to get some help around the house and help with preparing meals. SW explained that to qualify for PCA services she would need to be on disability and MA, then she may request a M[place]ho[employer] Assessment. Joesph does have disability, but hasn't started the [place] application yet. SW encouraged her to do so and provided phone number and instructions for requesting screening once that is done.     In the meantime, sw offered list of private pay home care providers and educated Joesph on [facility]. Joesph took the application and information and agreed to think about it. SW encouraged her to bring application back next week and SW can assist if she decides she would like to apply for the service.      Assessment/Plan:  SW will continue to follow and remain available as needed and appropriate. Will check in with Joesph at infusion next week to review [facility] decision.     Provided patient/family with contact information and availability.    ROC Solorzano, [facility]  Clinical , Adult Oncology  Pager: [phone]  Phone: [phone]

## 2020-09-18 NOTE — PROGRESS NOTES
"Patient's name and  were verified.  See Doc Flowsheet - IV assess for details.  IVAD accessed with 20G  3/4\" hernandez gripper plus needle  blood return positive: YES  Site without redness, tenderness or swelling: YES  flushed with 30cc NS and 5cc 100u/ml heparin  Needle: Left accessed for infusion    Comments: Labs drawn.  Patient tolerated procedure without incident.    Laurel Rodriguez  RN, BSN, OCN        "

## 2020-09-18 NOTE — NURSING NOTE
"Oncology Rooming Note    September 18, 2020 8:07 AM   Joesph Zeng is a 50 year old female who presents for:    Chief Complaint   Patient presents with     Oncology Clinic Visit     Prior to treatment     Initial Vitals: /82 (BP Location: Left arm)   Pulse 66   Temp 97.8  F (36.6  C) (Oral)   Resp 16   Wt 102.8 kg (226 lb 9.6 oz)   LMP  (LMP Unknown)   SpO2 97%   BMI 38.90 kg/m   Estimated body mass index is 38.9 kg/m  as calculated from the following:    Height as of 8/7/20: 1.626 m (5' 4\").    Weight as of this encounter: 102.8 kg (226 lb 9.6 oz). Body surface area is 2.15 meters squared.  Extreme Pain (8) Comment: Data Unavailable   No LMP recorded (lmp unknown). Patient is postmenopausal.  Allergies reviewed: Yes  Medications reviewed: Yes    Medications: MEDICATION REFILLS NEEDED TODAY. Provider was notified.  Pharmacy name entered into [employer]:    [id number] - [place] [place] - [address] [facility].    Luna Martinez LPN              "

## 2020-09-18 NOTE — LETTER
9/18/2020         RE: Joesph Zeng  [address]  [place] [facility] [place] [address]        Dear Colleague,    Thank you for referring your patient, Joesph Zeng, to the [facility]. Please see a copy of my visit note below.    [facility]ANS  MEDICAL ONCOLOGY    RETURN PATIENT VISIT NOTE    Reason for visit: Intrahepatic cholangiocarcinoma     Oncology treatment summary  1.  Began having symptoms October 2019 with abdominal bloating and constipation with nausea.  Eventually she had a 30 pound weight loss with increasing GI symptoms  2.  Abdominal ultrasound 7/10/2020 with a large mass in the upper abdomen along with a large liver mass  3.  CT abdomen pelvis performed 7/13/2020 with a large hypoattenuating mass in the inferior right lobe of the liver (segment 6) measuring up to 6.5 cm.  Additionally there was a mass in the caudate lobe of the liver which appeared to surround the portal vein.  This measures 6 cm.  There was a large hypoattenuating necrotic lymph node in the aortocaval region measuring 3.5 cm along with other retroperitoneal adenopathy seen posterior to the left renal vein measuring up to 2.6 cm  4. Bx with EBV+ adenocarcinoma, intrahepatic choleangiocarcinoma. PDL! CPS 7, pMMR, NTRK-, MET-, FGFR2 pending  5. 8/18/2020 began pallaitive gemzar/cisplatin     HISTORY OF PRESENTING ILLNESS  Patient is here for a face-to-face visit today.  She is about the same, she notes her pain is about 8/10 but controlled with one oxycodone daily. I had scheduled for her a celiac block however she didn't follow through as scheduled for a pre op was difficult. She doesn't want to reschedule at this point. She is sleepy throughout our visit. She tolerated the chemotherapy pretty okay, with a little bit of nausea. She is wondering is she can get a PCA and registered for the [place] MJ program.        PAST MEDICAL HISTORY  Depression, PTSD, cholecystectomy      CURRENT OUTPATIENT  MEDICATIONS  Current Outpatient Medications   Medication     diazepam (VALIUM) 5 MG tablet     docusate sodium (COLACE) 100 MG capsule     FLUoxetine 20 MG tablet     lidocaine-prilocaine (EMLA) 2.5-2.5 % external cream     LORazepam (ATIVAN) 0.5 MG tablet     mirtazapine (REMERON) 15 MG tablet     ondansetron (ZOFRAN) 8 MG tablet     ondansetron (ZOFRAN) 8 MG tablet     ondansetron (ZOFRAN-ODT) 4 MG ODT tab     oxyCODONE (ROXICODONE) 5 MG tablet     polyethylene glycol (MIRALAX) powder     prochlorperazine (COMPAZINE) 10 MG tablet     prochlorperazine (COMPAZINE) 5 MG tablet     risperiDONE (RISPERDAL) 1 MG tablet     No current facility-administered medications for this visit.      Facility-Administered Medications Ordered in Other Visits   Medication     heparin 100 UNIT/ML injection 5 mL     sodium chloride (PF) 0.9% PF flush 10-20 mL        ALLERGIES   No Known Allergies     SOCIAL HISTORY  No tobacco use, she is single and has a fiancé.  She lives in [place].  She has 7 children.  She works at home.  No tobacco or excessive alcohol use.     FAMILY HISTORY  No malignancies in her family history that she is aware of     REVIEW OF SYSTEMS  Review Of Systems  Skin: negative  Eyes: negative  Ears/Nose/Throat: negative  Respiratory: No shortness of breath, dyspnea on exertion, cough, or hemoptysis  Cardiovascular: negative  Gastrointestinal: negative per HPI  Genitourinary: negative  Musculoskeletal: negative  Neurologic: negative  Psychiatric: negative  Hematologic/Lymphatic/Immunologic: negative  Endocrine: negative      PHYSICAL EXAM  Physical Exam  Constitutional:       Appearance: Normal appearance.   HENT:      Head: Normocephalic and atraumatic.      Nose: Nose normal.   Eyes:      Extraocular Movements: Extraocular movements intact.      Conjunctiva/sclera: Conjunctivae normal.      Pupils: Pupils are equal, round, and reactive to light.   Neck:      Musculoskeletal: Normal range of motion and neck  supple.   Cardiovascular:      Rate and Rhythm: Normal rate and regular rhythm.   Pulmonary:      Effort: Pulmonary effort is normal.      Breath sounds: Normal breath sounds.   Abdominal:      General: Abdomen is flat. Bowel sounds are normal.      Palpations: Abdomen is soft.   Musculoskeletal: Normal range of motion.   Skin:     General: Skin is warm and dry.   Neurological:      General: No focal deficit present.      Mental Status: She is alert and oriented to person, place, and time. Mental status is at baseline.   Psychiatric:         Mood and Affect: Mood normal.         Behavior: Behavior normal.         Thought Content: Thought content normal.         Judgment: Judgment normal.       .        LABORATORY AND IMAGING STUDIES  Recent Labs   Lab Test 09/18/20  0740 09/02/20  0800 08/26/20  0805 08/14/20  1555 07/02/20  1402    138 137 137 138   POTASSIUM 3.3* 3.6 3.5 3.9 3.9   CHLORIDE 106 107 105 105 102   CO2 27 28 29 25 29   ANIONGAP 6 3 3 7 7   BUN 9 12 12 19 10   CR 0.55 0.72 0.77 0.74 0.80   * 109* 120* 110* 97   IMELDA 8.5 8.3* 8.8 9.2 9.0     Recent Labs   Lab Test 09/18/20  0740 09/02/20  0800 08/26/20  0805   MAG 2.1 2.3 2.3     Recent Labs   Lab Test 09/18/20  0740 09/02/20  0800 08/26/20  0805 08/14/20  1555 07/31/20  1140   WBC 4.1 4.1 4.0 6.2 4.6   HGB 12.3 12.5 13.4 14.9 13.5   PLT 83* 114* 65* 125* 115*   MCV 91 90 88 89 91   NEUTROPHIL 63.4 60.3 60.0 69.3 63.9     Recent Labs   Lab Test 09/18/20  0740 09/02/20  0800 08/26/20  0805   BILITOTAL 1.1 0.8 0.7   ALKPHOS 132 158* 124   ALT 78* 80* 129*   AST 70* 71* 68*   ALBUMIN 3.0* 3.0* 3.2*     TSH   Date Value Ref Range Status   03/05/2020 3.20 0.40 - 4.00 mU/L Final     No results for input(s): CEA in the last 83903 hours.  Results for orders placed or performed during the hospital encounter of 07/31/20   CT Liver Biopsy    Narrative    PROCEDURES 7/31/2020:  1. CT guided percutaneous biopsy.     Clinical History: Liver masses. Biopsy  requested.    Comparisons: CT abdomen pelvis 7/13/2020    Staff Radiologist: Arturo Villalta MD    I, ARTURO VILLALTA MD, attest that I was present for all critical  portions of the procedure and was immediately available to provide  guidance and assistance during the remainder of the procedure.    Fellow: Favian Helton MD    Resident: Aleksey Hernandez MD    Medications: The patient was placed on continuous monitoring.  Intravenous sedation was administered. 100 mcg fentanyl and 2 mg  Versed administered intravenously. Total sedation time was 25 minutes.  Vital signs and sedation monitored by nursing staff under  Interventional Radiologists supervision. The patient remained stable  throughout the procedure.    Dose: 1362 mGycm.    PROCEDURE: The patient understood the limitations, alternatives, and  risks of the procedure and requested the procedure be performed. Both  written and oral consent were obtained.    Limited pre-procedural scan performed demonstrated adequate lesion  size and position for biopsy.    The right flank was prepped and draped in the usual sterile fashion.  Ten to one volume mixture of 1% lidocaine without epinephrine was used  for local anesthesia.     Under CT guidance, a 17 gauge coaxial introducer needle was advanced  into the right hepatic lobe tumor. Six 18 gauge core biopsies were  obtained with a OffSite VISION biopsy gun and submitted to Pathology.    Good hemostasis was achieved. Sterile dressing applied.    Limited post-procedural scan demonstrated no immediate complication.       Impression    IMPRESSION:   1. Six 18 gauge core biopsies obtained of the right hepatic lobe tumor  under CT-guidance.    I have personally reviewed the examination and initial interpretation  and I agree with the findings.    ARTURO VILLALTA MD            ASSESSMENT  AND RECOMMENDATIONS:  1.  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  History of hepatitis B   3.   Depression  4.  Chemotherapy induced thrombocytopenia    Plan  1. Continue palliative gemzar/cisplatin, will change dosing to q o week given thrombocytopenia, we will plan follow up imaging at the end of October to early November  2. FGFR2 testing was not done on NGS, will follow through on having that done  3.  to see  4. Refilled meds  5. She had a low viral titre prior to starting chemotherapy and I do want her to see ID about that.  6. See me in 2 weeks      Again, thank you for allowing me to participate in the care of your patient.        Sincerely,        Laurie Fraser MD

## 2020-09-29 NOTE — PROGRESS NOTES
Returned call to patient regarding that she has not received a phone call for upcoming infusion appointment for this week.  Patient was upset when writer called - she states there has been inconsistency in her care right from when initially diagnosed.  She states she was complaining of stomach pain and her stomach was being checked but nothing else.  She states when her endoscopy was scheduled she wasn't told she needed a physical until it was two days before her procedure and then she had to scramble to try and get it done.  She also states she received prep for a colonoscopy not endoscopy which understandably was very confusing for her.  Patient continued to express her frustration asking why no one has called her with appointments for infusion, she requested to have appointments ahead of time as she depends on rides.  Writer reviewed appointments in chart and noted that her appointments had been scheduled on September 18 by our scheduling team for appointment on 10/2.   Patient states that when she looked in my chart a couple of days ago she did not see them.  Writer advised will seek out someone to look into her mychart.  Writer also reviewed upcoming appointment through the end of October.  Patient wrote down the information.  Patient has treatment scheduled at [facility] this Friday, (no availability at [place]) but first is seeing Dr. Fraser in [place].  Writer advised will check with our infusion team, perhaps there was a cancellation and patient could have her treatment in the clinic.  Advised will call with update.  Patient states she is looking to go elsewhere for her cancer care but was advised to wait until she had at least 6 months of treatment before transfer- this advise was given by clinic is looking into transferring to.  Writer was on this call for about 20 minutes, listening and validating patient's frustration.

## 2020-10-02 NOTE — PROGRESS NOTES
[facility] PHYSICIANS  MEDICAL ONCOLOGY    RETURN PATIENT VISIT NOTE    Reason for visit: Intrahepatic cholangiocarcinoma     Oncology treatment summary  1.  Began having symptoms October 2019 with abdominal bloating and constipation with nausea.  Eventually she had a 30 pound weight loss with increasing GI symptoms  2.  Abdominal ultrasound 7/10/2020 with a large mass in the upper abdomen along with a large liver mass  3.  CT abdomen pelvis performed 7/13/2020 with a large hypoattenuating mass in the inferior right lobe of the liver (segment 6) measuring up to 6.5 cm.  Additionally there was a mass in the caudate lobe of the liver which appeared to surround the portal vein.  This measures 6 cm.  There was a large hypoattenuating necrotic lymph node in the aortocaval region measuring 3.5 cm along with other retroperitoneal adenopathy seen posterior to the left renal vein measuring up to 2.6 cm  4. Bx with EBV+ adenocarcinoma, intrahepatic choleangiocarcinoma. PDL! CPS 7, pMMR, NTRK-, MET-, FGFR2 pending  5. 8/18/2020 began pallaitive gemzar/cisplatin     HISTORY OF PRESENTING ILLNESS  Patient is here for a face-to-face visit today.  She is somewhat sedated today.  She notes this is from her depression medication.  She has been doing okay over the last couple of weeks.  She continues to take oxycodone twice daily for pain as needed.  Her pain is under reasonable control and only notes to be a problem after she eats for the most part.  She is quite tired and fatigued and she relates this to both chemotherapy, the cancer and her antidepressants.  She is hoping she will not need chemotherapy today as she elected to not receive it.  No other new problems.  She has not yet followed through with infectious disease     PAST MEDICAL HISTORY  Depression, PTSD, cholecystectomy, hepatitis b      CURRENT OUTPATIENT MEDICATIONS  Current Outpatient Medications   Medication     diazepam (VALIUM) 5 MG tablet      docusate sodium (COLACE) 100 MG capsule     FLUoxetine 20 MG tablet     lidocaine-prilocaine (EMLA) 2.5-2.5 % external cream     LORazepam (ATIVAN) 0.5 MG tablet     mirtazapine (REMERON) 15 MG tablet     ondansetron (ZOFRAN) 8 MG tablet     ondansetron (ZOFRAN) 8 MG tablet     ondansetron (ZOFRAN-ODT) 4 MG ODT tab     oxyCODONE (ROXICODONE) 5 MG tablet     polyethylene glycol (MIRALAX) powder     prochlorperazine (COMPAZINE) 10 MG tablet     prochlorperazine (COMPAZINE) 5 MG tablet     risperiDONE (RISPERDAL) 1 MG tablet     No current facility-administered medications for this visit.         ALLERGIES   No Known Allergies     SOCIAL HISTORY  No tobacco use, she is single and has a fiancé.  She lives in [place].  She has 7 children.  She works at home.  No tobacco or excessive alcohol use.     FAMILY HISTORY  No malignancies in her family history that she is aware of     REVIEW OF SYSTEMS  Review Of Systems  Skin: negative  Eyes: negative  Ears/Nose/Throat: negative  Respiratory: No shortness of breath, dyspnea on exertion, cough, or hemoptysis  Cardiovascular: negative  Gastrointestinal: negative per HPI  Genitourinary: negative  Musculoskeletal: negative  Neurologic: negative  Psychiatric: negative  Hematologic/Lymphatic/Immunologic: negative  Endocrine: negative      PHYSICAL EXAM  Physical Exam  Constitutional:       Appearance: Normal appearance.   HENT:      Head: Normocephalic and atraumatic.      Nose: Nose normal.   Eyes:      Extraocular Movements: Extraocular movements intact.      Conjunctiva/sclera: Conjunctivae normal.      Pupils: Pupils are equal, round, and reactive to light.   Neck:      Musculoskeletal: Normal range of motion and neck supple.   Cardiovascular:      Rate and Rhythm: Normal rate and regular rhythm.   Pulmonary:      Effort: Pulmonary effort is normal.      Breath sounds: Normal breath sounds.   Abdominal:      General: Abdomen is flat. Bowel sounds are normal.      Palpations:  Abdomen is soft.   Musculoskeletal: Normal range of motion.   Skin:     General: Skin is warm and dry.   Neurological:      General: No focal deficit present.      Mental Status: She is alert and oriented to person, place, and time. Mental status is at baseline.   Psychiatric:         Mood and Affect: Mood normal.         Behavior: Behavior normal.         Thought Content: Thought content normal.         Judgment: Judgment normal.       .        LABORATORY AND IMAGING STUDIES  Recent Labs   Lab Test 09/18/20  0740 09/02/20  0800 08/26/20  0805 08/14/20  1555 07/02/20  1402    138 137 137 138   POTASSIUM 3.3* 3.6 3.5 3.9 3.9   CHLORIDE 106 107 105 105 102   CO2 27 28 29 25 29   ANIONGAP 6 3 3 7 7   BUN 9 12 12 19 10   CR 0.55 0.72 0.77 0.74 0.80   * 109* 120* 110* 97   IMELDA 8.5 8.3* 8.8 9.2 9.0     Recent Labs   Lab Test 09/18/20  0740 09/02/20  0800 08/26/20  0805   MAG 2.1 2.3 2.3     Recent Labs   Lab Test 09/18/20  0740 09/02/20  0800 08/26/20  0805 08/14/20  1555 07/31/20  1140   WBC 4.1 4.1 4.0 6.2 4.6   HGB 12.3 12.5 13.4 14.9 13.5   PLT 83* 114* 65* 125* 115*   MCV 91 90 88 89 91   NEUTROPHIL 63.4 60.3 60.0 69.3 63.9     Recent Labs   Lab Test 09/18/20  0740 09/02/20  0800 08/26/20  0805   BILITOTAL 1.1 0.8 0.7   ALKPHOS 132 158* 124   ALT 78* 80* 129*   AST 70* 71* 68*   ALBUMIN 3.0* 3.0* 3.2*     TSH   Date Value Ref Range Status   03/05/2020 3.20 0.40 - 4.00 mU/L Final     No results for input(s): CEA in the last 83603 hours.  Results for orders placed or performed during the hospital encounter of 07/31/20   CT Liver Biopsy    Narrative    PROCEDURES 7/31/2020:  1. CT guided percutaneous biopsy.     Clinical History: Liver masses. Biopsy requested.    Comparisons: CT abdomen pelvis 7/13/2020    Staff Radiologist: MD ANNA Ruth, ARTURO VILLALTA MD, attest that I was present for all critical  portions of the procedure and was immediately available to provide  guidance and assistance during  the remainder of the procedure.    Fellow: Favian Helton MD    Resident: Aleksey Hernandez MD    Medications: The patient was placed on continuous monitoring.  Intravenous sedation was administered. 100 mcg fentanyl and 2 mg  Versed administered intravenously. Total sedation time was 25 minutes.  Vital signs and sedation monitored by nursing staff under  Interventional Radiologists supervision. The patient remained stable  throughout the procedure.    Dose: 1362 mGycm.    PROCEDURE: The patient understood the limitations, alternatives, and  risks of the procedure and requested the procedure be performed. Both  written and oral consent were obtained.    Limited pre-procedural scan performed demonstrated adequate lesion  size and position for biopsy.    The right flank was prepped and draped in the usual sterile fashion.  Ten to one volume mixture of 1% lidocaine without epinephrine was used  for local anesthesia.     Under CT guidance, a 17 gauge coaxial introducer needle was advanced  into the right hepatic lobe tumor. Six 18 gauge core biopsies were  obtained with a [employer] biopsy gun and submitted to Pathology.    Good hemostasis was achieved. Sterile dressing applied.    Limited post-procedural scan demonstrated no immediate complication.       Impression    IMPRESSION:   1. Six 18 gauge core biopsies obtained of the right hepatic lobe tumor  under CT-guidance.    I have personally reviewed the examination and initial interpretation  and I agree with the findings.    ARTURO VILLALTA MD            ASSESSMENT  AND RECOMMENDATIONS:  1.  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  History of hepatitis B   3.  Depression  4.  Chemotherapy induced thrombocytopenia    Plan  1.  I again reemphasized that I would like her to see infectious disease as she had a low titer of hep B positivity with a viral load.  She does not want to drive downtown for this and I suggested perhaps  they can do a video visit and we will check that out.  2.  We will hold her chemotherapy today due to her thrombocytopenia I will dose reduce her by 20% and we will try to get her back on a day 1 and 8 every 21-day cycle.  If she is unable to tolerate this we will then to need to go on a day 1 and 15 every 28-day cycle.  3.  She would like to receive IV fluids a couple of days after chemotherapy as she finds it difficult to get fluids in.  4.  We discussed doing follow-up imaging around the end of October early November after cycle 3.    Laurie Fraser MD on 10/2/2020 at 10:33 AM

## 2020-10-02 NOTE — NURSING NOTE
"Oncology Rooming Note    October 2, 2020 9:23 AM   Joesph Zeng is a 50 year old female who presents for:    No chief complaint on file.    Initial Vitals: /69 (BP Location: Right arm)   Pulse 69   Temp 97.9  F (36.6  C) (Oral)   Resp 16   Wt 100.9 kg (222 lb 8 oz)   LMP  (LMP Unknown)   SpO2 98%   BMI 38.19 kg/m   Estimated body mass index is 38.19 kg/m  as calculated from the following:    Height as of 8/7/20: 1.626 m (5' 4\").    Weight as of this encounter: 100.9 kg (222 lb 8 oz). Body surface area is 2.13 meters squared.  No Pain (0) Comment: Data Unavailable   No LMP recorded (lmp unknown). Patient is postmenopausal.  Allergies reviewed: Yes  Medications reviewed: Yes    Medications: Medication refills not needed today.  Pharmacy name entered into [facility]:    [id number] - [place], [place] - [address]  [id number] - [place], [place] - [address].      Luna Martinez LPN                "

## 2020-10-02 NOTE — TELEPHONE ENCOUNTER
[facility] Call Center    Phone Message    May a detailed message be left on voicemail: yes     Reason for Call: Appointment Intake    Referring Provider Name: Laurie Fraser MD in [facility]    Diagnosis and/or Symptoms: Cholangiocarcinoma (H) [C22.1]      Per Ashanti pt is requesting video appt.     Action Taken: Message routed to:  [facility] ([facility]): id    Travel Screening: Not Applicable

## 2020-10-02 NOTE — PROGRESS NOTES
Infusion Nursing Note:  Joesph Zeng presents today for C2D8 Cisplatin/Gemzar DEFERRED.    Patient seen by provider today: Yes: Evelio   present during visit today: Not Applicable.    Note: Pt saw Dr Fraser, chemotherapy deferred one week due to low platelets.    Intravenous Access:  Implanted Port.    Treatment Conditions:  Lab Results   Component Value Date    HGB 12.5 10/02/2020     Lab Results   Component Value Date    WBC 4.8 10/02/2020      Lab Results   Component Value Date    ANEU 3.2 10/02/2020     Lab Results   Component Value Date    PLT 68 10/02/2020      Results reviewed, labs did NOT meet treatment parameters: PLT 68k.      Post Infusion Assessment:  Site patent and intact, free from redness, edema or discomfort.  Access discontinued per protocol.       Discharge Plan:   Patient discharged in stable condition accompanied by: self.  Departure Mode: Ambulatory.  Pt will RTC 10/8/20 for C2D8 Cisplatin/Gemzar.    Shoaib Ruffin RN

## 2020-10-02 NOTE — PROGRESS NOTES
Port needle left accessed for treatment. Tolerated port access and draw without complaint. Port site scrubbed with Chloraprep for 30 seconds and allowed to dry completely prior to dressing application. Accessed using sterile technique. [place] tubes drawn-Red gel/Green/Purple tubes. Double signed by patient and RN. See documentation flowsheet. Anamika Leiva, RN, BSN, OCN

## 2020-10-02 NOTE — LETTER
10/2/2020         RE: Joesph Zeng  [address]  [place] [address]        Dear Colleague,    Thank you for referring your patient, Joesph Zeng, to the [facility]. Please see a copy of my visit note below.    [facility] PHYSICIANS  MEDICAL ONCOLOGY    RETURN PATIENT VISIT NOTE    Reason for visit: Intrahepatic cholangiocarcinoma     Oncology treatment summary  1.  Began having symptoms October 2019 with abdominal bloating and constipation with nausea.  Eventually she had a 30 pound weight loss with increasing GI symptoms  2.  Abdominal ultrasound 7/10/2020 with a large mass in the upper abdomen along with a large liver mass  3.  CT abdomen pelvis performed 7/13/2020 with a large hypoattenuating mass in the inferior right lobe of the liver (segment 6) measuring up to 6.5 cm.  Additionally there was a mass in the caudate lobe of the liver which appeared to surround the portal vein.  This measures 6 cm.  There was a large hypoattenuating necrotic lymph node in the aortocaval region measuring 3.5 cm along with other retroperitoneal adenopathy seen posterior to the left renal vein measuring up to 2.6 cm  4. Bx with EBV+ adenocarcinoma, intrahepatic choleangiocarcinoma. PDL! CPS 7, pMMR, NTRK-, MET-, FGFR2 pending  5. 8/18/2020 began pallaitive gemzar/cisplatin     HISTORY OF PRESENTING ILLNESS  Patient is here for a face-to-face visit today.  She is somewhat sedated today.  She notes this is from her depression medication.  She has been doing okay over the last couple of weeks.  She continues to take oxycodone twice daily for pain as needed.  Her pain is under reasonable control and only notes to be a problem after she eats for the most part.  She is quite tired and fatigued and she relates this to both chemotherapy, the cancer and her antidepressants.  She is hoping she will not need chemotherapy today as she elected to not receive it.  No other new problems.   She has not yet followed through with infectious disease     PAST MEDICAL HISTORY  Depression, PTSD, cholecystectomy, hepatitis b      CURRENT OUTPATIENT MEDICATIONS  Current Outpatient Medications   Medication     diazepam (VALIUM) 5 MG tablet     docusate sodium (COLACE) 100 MG capsule     FLUoxetine 20 MG tablet     lidocaine-prilocaine (EMLA) 2.5-2.5 % external cream     LORazepam (ATIVAN) 0.5 MG tablet     mirtazapine (REMERON) 15 MG tablet     ondansetron (ZOFRAN) 8 MG tablet     ondansetron (ZOFRAN) 8 MG tablet     ondansetron (ZOFRAN-ODT) 4 MG ODT tab     oxyCODONE (ROXICODONE) 5 MG tablet     polyethylene glycol (MIRALAX) powder     prochlorperazine (COMPAZINE) 10 MG tablet     prochlorperazine (COMPAZINE) 5 MG tablet     risperiDONE (RISPERDAL) 1 MG tablet     No current facility-administered medications for this visit.         ALLERGIES   No Known Allergies     SOCIAL HISTORY  No tobacco use, she is single and has a fiancé.  She lives in [place].  She has 7 children.  She works at home.  No tobacco or excessive alcohol use.     FAMILY HISTORY  No malignancies in her family history that she is aware of     REVIEW OF SYSTEMS  Review Of Systems  Skin: negative  Eyes: negative  Ears/Nose/Throat: negative  Respiratory: No shortness of breath, dyspnea on exertion, cough, or hemoptysis  Cardiovascular: negative  Gastrointestinal: negative per HPI  Genitourinary: negative  Musculoskeletal: negative  Neurologic: negative  Psychiatric: negative  Hematologic/Lymphatic/Immunologic: negative  Endocrine: negative      PHYSICAL EXAM  Physical Exam  Constitutional:       Appearance: Normal appearance.   HENT:      Head: Normocephalic and atraumatic.      Nose: Nose normal.   Eyes:      Extraocular Movements: Extraocular movements intact.      Conjunctiva/sclera: Conjunctivae normal.      Pupils: Pupils are equal, round, and reactive to light.   Neck:      Musculoskeletal: Normal range of motion and neck supple.    Cardiovascular:      Rate and Rhythm: Normal rate and regular rhythm.   Pulmonary:      Effort: Pulmonary effort is normal.      Breath sounds: Normal breath sounds.   Abdominal:      General: Abdomen is flat. Bowel sounds are normal.      Palpations: Abdomen is soft.   Musculoskeletal: Normal range of motion.   Skin:     General: Skin is warm and dry.   Neurological:      General: No focal deficit present.      Mental Status: She is alert and oriented to person, place, and time. Mental status is at baseline.   Psychiatric:         Mood and Affect: Mood normal.         Behavior: Behavior normal.         Thought Content: Thought content normal.         Judgment: Judgment normal.       .        LABORATORY AND IMAGING STUDIES  Recent Labs   Lab Test 09/18/20  0740 09/02/20  0800 08/26/20  0805 08/14/20  1555 07/02/20  1402    138 137 137 138   POTASSIUM 3.3* 3.6 3.5 3.9 3.9   CHLORIDE 106 107 105 105 102   CO2 27 28 29 25 29   ANIONGAP 6 3 3 7 7   BUN 9 12 12 19 10   CR 0.55 0.72 0.77 0.74 0.80   * 109* 120* 110* 97   IMELDA 8.5 8.3* 8.8 9.2 9.0     Recent Labs   Lab Test 09/18/20  0740 09/02/20  0800 08/26/20  0805   MAG 2.1 2.3 2.3     Recent Labs   Lab Test 09/18/20  0740 09/02/20  0800 08/26/20  0805 08/14/20  1555 07/31/20  1140   WBC 4.1 4.1 4.0 6.2 4.6   HGB 12.3 12.5 13.4 14.9 13.5   PLT 83* 114* 65* 125* 115*   MCV 91 90 88 89 91   NEUTROPHIL 63.4 60.3 60.0 69.3 63.9     Recent Labs   Lab Test 09/18/20  0740 09/02/20  0800 08/26/20  0805   BILITOTAL 1.1 0.8 0.7   ALKPHOS 132 158* 124   ALT 78* 80* 129*   AST 70* 71* 68*   ALBUMIN 3.0* 3.0* 3.2*     TSH   Date Value Ref Range Status   03/05/2020 3.20 0.40 - 4.00 mU/L Final     No results for input(s): CEA in the last 08664 hours.  Results for orders placed or performed during the hospital encounter of 07/31/20   CT Liver Biopsy    Narrative    PROCEDURES 7/31/2020:  1. CT guided percutaneous biopsy.     Clinical History: Liver masses. Biopsy  requested.    Comparisons: CT abdomen pelvis 7/13/2020    Staff Radiologist: Arturo Villalta MD    I, ARTURO VILLALTA MD, attest that I was present for all critical  portions of the procedure and was immediately available to provide  guidance and assistance during the remainder of the procedure.    Fellow: Favian Helton MD    Resident: Aleksey Hernandez MD    Medications: The patient was placed on continuous monitoring.  Intravenous sedation was administered. 100 mcg fentanyl and 2 mg  Versed administered intravenously. Total sedation time was 25 minutes.  Vital signs and sedation monitored by nursing staff under  Interventional Radiologists supervision. The patient remained stable  throughout the procedure.    Dose: 1362 mGycm.    PROCEDURE: The patient understood the limitations, alternatives, and  risks of the procedure and requested the procedure be performed. Both  written and oral consent were obtained.    Limited pre-procedural scan performed demonstrated adequate lesion  size and position for biopsy.    The right flank was prepped and draped in the usual sterile fashion.  Ten to one volume mixture of 1% lidocaine without epinephrine was used  for local anesthesia.     Under CT guidance, a 17 gauge coaxial introducer needle was advanced  into the right hepatic lobe tumor. Six 18 gauge core biopsies were  obtained with a [employer] biopsy gun and submitted to Pathology.    Good hemostasis was achieved. Sterile dressing applied.    Limited post-procedural scan demonstrated no immediate complication.       Impression    IMPRESSION:   1. Six 18 gauge core biopsies obtained of the right hepatic lobe tumor  under CT-guidance.    I have personally reviewed the examination and initial interpretation  and I agree with the findings.    ARTURO VILLALTA MD            ASSESSMENT  AND RECOMMENDATIONS:  1.  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  History of hepatitis B   3.   Depression  4.  Chemotherapy induced thrombocytopenia    Plan  1.  I again reemphasized that I would like her to see infectious disease as she had a low titer of hep B positivity with a viral load.  She does not want to drive downtown for this and I suggested perhaps they can do a video visit and we will check that out.  2.  We will hold her chemotherapy today due to her thrombocytopenia I will dose reduce her by 20% and we will try to get her back on a day 1 and 8 every 21-day cycle.  If she is unable to tolerate this we will then to need to go on a day 1 and 15 every 28-day cycle.  3.  She would like to receive IV fluids a couple of days after chemotherapy as she finds it difficult to get fluids in.  4.  We discussed doing follow-up imaging around the end of October early November after cycle 3.    Laurie Fraser MD on 10/2/2020 at 10:33 AM          Again, thank you for allowing me to participate in the care of your patient.        Sincerely,        aLurie Fraser MD

## 2020-10-02 NOTE — TELEPHONE ENCOUNTER
Per order this patient needs an appointment with Infectious Disease. I called Infectious Disease to schedule an appointment for this patient and they sent the request off to be reviewed so they can schedule. They will call the patient directly to schedule.-cap-10/02/2020

## 2020-10-06 NOTE — TELEPHONE ENCOUNTER
RECORDS RECEIVED FROM: Internal - Cholangiocarcinoma (H) [C22.1]   DATE RECEIVED: 10.12.2020   NOTES (Gather within 2 years) STATUS DETAILS   OFFICE NOTE from referring provider   Internal 10.02.2020 Laurie Fraser MD   OFFICE NOTE from other specialist Internal 08.14.2020 Jong White MD   DISCHARGE SUMMARY from hospital N/A    DISCHARGE REPORT from the ER n/a    LABS (any labs) Internal    MEDICATION LIST Internal    IMAGING  (NEED IMAGES AND REPORTS)     Osteomyelitis: Foot imaging  N/A    Liver Abscess: Abdominal imaging N/A    Other (anything related to diagnoses N/A

## 2020-10-08 NOTE — PROGRESS NOTES
Infusion Nursing Note:  Joesph Zeng presents today for C2D8 Cisplatin/Gemzar.    Patient seen by provider today: No   present during visit today: Not Applicable.    Note: Pt c/o terrible fatigue, dizziness and weakness. States her abdominal pain is a 7/10.  Cyndee Severino NP refilled oxycodone and compazine.  See flow sheet for assessment.     Intravenous Access:  Implanted Port.    Treatment Conditions:  Lab Results   Component Value Date    HGB 13.2 10/08/2020     Lab Results   Component Value Date    WBC 3.5 10/08/2020      Lab Results   Component Value Date    ANEU 2.0 10/08/2020     Lab Results   Component Value Date     10/08/2020      Lab Results   Component Value Date     10/08/2020                   Lab Results   Component Value Date    POTASSIUM 3.7 10/08/2020           Lab Results   Component Value Date    MAG 2.1 10/08/2020            Lab Results   Component Value Date    CR 0.66 10/08/2020                   Lab Results   Component Value Date    IMELDA 8.9 10/08/2020                Lab Results   Component Value Date    BILITOTAL 1.3 10/08/2020           Lab Results   Component Value Date    ALBUMIN 2.8 10/08/2020                    Lab Results   Component Value Date     10/08/2020           Lab Results   Component Value Date     10/08/2020       Results reviewed, labs MET treatment parameters, ok to proceed with treatment.      Post Infusion Assessment:  Patient tolerated infusion without incident.  Blood return noted pre and post infusion.  Site patent and intact, free from redness, edema or discomfort.  No evidence of extravasations.  Access discontinued per protocol.       Discharge Plan:   Patient discharged in stable condition accompanied by: self.  Departure Mode: Ambulatory.  Pt will RTC 10/12/20 for IVF.    Shoaib Ruffin RN

## 2020-10-08 NOTE — PROGRESS NOTES
Port needle left accessed for treatment. Tolerated port access and draw without complaint. Port site scrubbed with Chloraprep for 30 seconds and allowed to dry completely prior to dressing application. Accessed using sterile technique. [place] tubes drawn-Red gel/Green/Purple tubes. Double signed by patient and RN. See documentation flowsheet. Anamika Leiva, RN, BSN, OCN

## 2020-10-12 NOTE — LETTER
10/12/2020       RE: Joesph Zeng  [address]  [facility] [address]     Dear Colleague,    Thank you for referring your patient, Joesph Zeng, to the [facility] [place] at [facility]. Please see a copy of my visit note below.    Left voicemail for patient to call back to set up telemedicine visit, will call again before appointment time.  Radha Claudio CMA on 10/12/2020 at 1:19 PM      Left voicemail for patient to call back to set up telemedicine visit, will call again before appointment time.  Radha Claudio CMA on 10/12/2020 at 1:36 PM      [facility]  Transplant Infectious Disease Clinic Note:  New Patient     Patient:  Joesph Zeng, Date of birth 1970, Medical record number [id number]  Date of Visit:  10/12/2020  Consult requested by Dr. Fraser for evaluation of Hepatitis B         Assessment and Recommendations:   Recommendations:  - Check Hep B s Ag, Hep B s Ab, HBV DNA  - Check anti-Hep D Ab (against delta virus)  - Check HCV Ab (and HCV RNA if positive) and HIV Ag/Ab test  - Repeat LFTs and coagulation studies  - Start Tenofovir (TAF, Vemlidy) 25 mg PO daily - recommend repeating HBV DNA and LFTs at least every 3 months while on therapy  - Check Hep B s Ag every year    Assessment:  49 y/o female with intrahepatic cholangiocarcinoma now on palliative chemotherapy with Gemcitabine and Cisplatin who is referred to ID for management of Hepatitis B    #Chronic Hepatitis B, Hep e negative, concern for reactivation:  Patient reports being a hepatitis B carrier (reportedly was diagnosed as such over a decade ago). Now with worsening transaminases in the presence of a detectable HBV viral load/DNA. For Hepatitis B e Ag negative patients,  Serum HBV DNA >2,000 IU/mL along with intermittently or persistently elevated ALT/AST concerning for immune active chronic hepatitis B. Per AASLD guidelines, treatment is  recommended when HBV DNA > 2000 and AST/ALT > 2 x ULN (upper limit normal). For this patient, progressive worsening of LFTs after starting chemotherapy concerning for reactivation/flare of Hep B. In this treatment naive patient, would recommend starting Tenofovir (TAF/Vemlidy) 25mg daily PO along with close monitoring of her enzymes. Would repeat BMP/LFTs and hepatitis markers and would also test for Hep C and HIV as well as delta virus (patient comes from  [place]).  Anticipate prolonged (indefinite, likely lifelong) therapy for her    #Intrahepatic Cholangiocarcinoma:  - ON gemcitabine and Cisplatin  - Management per Oncology team    F/u 1 month  BRINDA Brock  [phone]         History of the Infectious Disease lllness:     51 y/o female, PMHx depression, PTSD, surgical history of cholecystectomy who was recently diagnosed with intrahepatic cholangiocarcinoma referred to ID for management of Hepatitis B    Patient first started having symptoms in October 2019 with nausea, constipation and abdominal bloating. She reported worsening GI symptoms and a near 30 lbs weight loss. Abdominal US 7/10/20 followed by CT abdomen/pelvis 7/13/20 showed large hypoattenuating liver mass along with necrotic lymph node in the aortocaval region, a subsequent biopsy confirmed diagnosis of intrahepatic cholangiocarcinoma.  Was subsequently started on palliative chemotherapy with Gemcitabine and Cisplatin    Prior to starting chemotherapy, patient was tested for Hepatitis B - found to have a low level viremia - 6126 copies (log 3.8), a negative Hepatitis B e Ag and a positive Hepatitis B e Ab  Patient followed up with ID today    Since starting chemotherapy, she reports feeling very tired and fatigued overall  Reports ongoing nausea and vomiting  Does report occasionally feeling feverish with some chills and rigors in the evenings  Persistent bloating and abdominal discomfort, particularly on the right and upper parts of her  "abdomen  Is still constipated, and occasionally has blood in stools  Denies cough, occasional SOB    Denies urinary symptoms  Denies skin rash, joint pain/swelling    Review of patient's labs shows that her transaminases have increased (on labs from 10/8, , )    Of note, patient was born in [facility] and immigrated to the [place] in [place] when she was 5. She was told she was a \"Hepatitis B carrier\" but never received treatment for the same  She mentions that her brothers are also hepatitis \"carriers\" and that one of them has been treated for the same  Denies knowledge of HCV/HIV testing  Lives at home with  and kids  No pets  No allergies  Does not drink or smoke, denies IVDU  Has not worked for over 10 years    Review of Systems:  CONSTITUTIONAL:  No fevers or chills. No night sweats.  EYES: negative for icterus or acute vision changes.   ENT:  negative for hearing loss, tinnitus or sore throat  RESPIRATORY:  negative for cough, sputum, dyspnea  CARDIOVASCULAR:  negative for chest pain, heart palpitations  GASTROINTESTINAL:  negative for nausea, vomiting, diarrhea or constipation  GENITOURINARY:  negative for dysuria or hematuria.  HEME:  No easy bruising or bleeding  INTEGUMENT:  negative for rash or pruritus  NEURO:  Negative for headache or tremor.    Past Medical History:   Diagnosis Date     Allergic rhinitis        Past Surgical History:   Procedure Laterality Date     CHOLECYSTECTOMY  2001     ESOPHAGOSCOPY, GASTROSCOPY, DUODENOSCOPY (EGD), COMBINED N/A 8/3/2020    Procedure: ESOPHAGOGASTRODUODENOSCOPY, WITH BIOPSY;  Surgeon: Kendrick Santizo MD;  Location: [facility] OR       Family History   Problem Relation Age of Onset     Diabetes Mother      Hypertension Mother      Suicide Father 35       Social History     Social History Narrative     Not on file     Social History     Tobacco Use     Smoking status: Never Smoker     Smokeless tobacco: Never Used   Substance Use Topics     Alcohol use: No     Drug " use: No       Immunization History   Administered Date(s) Administered     [id number] Novel Flu 04/22/2010     [id number] Novel Flu P-free 11/06/2009, 11/17/2009     HepB-Adult 01/21/2005     Influenza (H1N1) 11/17/2009     Influenza (IIV3) PF 11/12/2007, 10/06/2009     MMR 01/21/2005     Td (Adult), Adsorbed 01/21/2005       Patient Active Problem List   Diagnosis     Morbid obesity (H)     Panic disorder with agoraphobia     PTSD (post-traumatic stress disorder)     Seasonal affective disorder (H)     Severe recurrent major depressive disorder with psychotic features (H)     Vitamin D deficiency     Diabetes mellitus, type 2 (H)     Urinary incontinence, unspecified type     Hyperglycemia     Constipation, unspecified constipation type      Family history of diabetes mellitus     Elevated LFTs     Hepatitis B carrier (H)     Primary malignant neoplasm of biliary tract (H)       No outpatient medications have been marked as taking for the 10/12/20 encounter (Virtual Visit) with Elmer Dixon MD.       No Known Allergies           Physical Exam:   Vitals were reviewed.  All vitals stable  LMP  (LMP Unknown)   Wt Readings from Last 4 Encounters:   10/08/20 99.9 kg (220 lb 4.8 oz)   10/02/20 100.9 kg (222 lb 8 oz)   09/21/20 101.2 kg (223 lb)   09/18/20 102.8 kg (226 lb 9.6 oz)       Exam:  Unable to be performed as visit was conducted over the telephone         Laboratory Data:     Metabolic Studies    Recent Labs   Lab Test 10/08/20  1140 10/02/20  0900 09/18/20  0740    140 139   POTASSIUM 3.7 3.5 3.3*   CHLORIDE 105 106 106   CO2 30 29 27   ANIONGAP 4 5 6   BUN 10 12 9   CR 0.66 0.72 0.55   GFRESTIMATED >90 >90 >90   * 104* 101*   IMELDA 8.9 8.4* 8.5   MAG 2.1 2.2 2.1       Hepatic Studies    Recent Labs   Lab Test 10/08/20  1140 10/02/20  0900 09/18/20  0740   BILITOTAL 1.3 0.7 1.1   ALKPHOS 152* 153* 132   PROTTOTAL 8.5 8.0 7.8   ALBUMIN 2.8* 2.9* 3.0*   * 124* 70*   * 116* 78*      Hematology Studies     Recent Labs   Lab Test 10/08/20  1140 10/02/20  0900 09/18/20  0740 09/02/20  0800   WBC 3.5* 4.8 4.1 4.1   ANEU 2.0 3.2 2.6 2.4   ALYM 1.1 1.0 0.9 1.1   OLIVIA 0.4 0.5 0.5 0.4   AEOS 0.1 0.1 0.1 0.1   HGB 13.2 12.5 12.3 12.5   HCT 40.9 38.1 37.5 38.8   * 68* 83* 114*       Clotting Studies    Recent Labs   Lab Test 07/31/20  1140   INR 1.19*       Urine Studies     Recent Labs   Lab Test 03/05/20  1117   URINEPH 6.0   NITRITE Negative   LEUKEST Small*   WBCU 5-10*       Microbiology:  Last Culture results with specimen source  Culture Micro   Date Value Ref Range Status   03/05/2020   Final    10,000 to 50,000 colonies/mL  mixed urogenital rasheed  Susceptibility testing not routinely done      Specimen Description   Date Value Ref Range Status   03/05/2020 Midstream Urine  Final        Quantiferon testing   Recent Labs   Lab Test 10/08/20  1140 10/02/20  0900   LYMPH 30.2 20.3     Virology:  Respiratory virus testing    Recent Labs   Lab Test 08/01/20  1624 07/25/20  1050   [id number] Nasopharyngeal Nasopharyngeal   SARSCOVRES NEGATIVE NEGATIVE       Hepatitis B Testing     Recent Labs   Lab Test 08/10/20  1018 08/07/20  1441   HBQRES 6,126* Canceled, Test credited     Was the last Hepatitis B E antigen positive?   Hepatitis Be Agn   Date Value Ref Range Status   08/10/2020 Negative Negative Final     Comment:     (Note)  Performed by [facility],  [address] [place] [id number] [phone]  [email or web address], Noe Leslie MD, Lab. Director       Imaging:    CT Chest w/ contrast (7/24):  IMPRESSION:   1. No focal airspace opacity. Scattered right calcified pulmonary  granulomas. No suspicious pulmonary nodules.     2. Redemonstration of heterogeneous hypodense right hepatic mass  measuring up to 6.5 cm which appears to abut the patent common hepatic  artery, better appreciated on CT abdomen and pelvis 7/13/2020.    CT Abdomen/Pelvis w/contrast (7/13):  IMPRESSION:  1. Large  "mass at the caudate lobe of the liver surrounding the portal  vein and there is an associated mass in the inferior right lobe of the  liver and significant necrotic lymphadenopathy in the retroperitoneum.  This is most consistent with malignancy. This could represent primary  hepatic origin, but could also represent metastasis although no other  primary lesions are seen.  2. Mild prominence of the wall of the gastric antrum/pylorus likely  represents a normal finding in this patient. Neoplastic infiltration  is considered less likely.                   Joesph Zeng is a 50 year old female who is being evaluated via a billable telephone visit.      The patient has been notified of following:     \"This telephone visit will be conducted via a call between you and your physician/provider. We have found that certain health care needs can be provided without the need for a physical exam.  This service lets us provide the care you need with a short phone conversation.  If a prescription is necessary we can send it directly to your pharmacy.  If lab work is needed we can place an order for that and you can then stop by our lab to have the test done at a later time.    Telephone visits are billed at different rates depending on your insurance coverage. During this emergency period, for some insurers they may be billed the same as an in-person visit.  Please reach out to your insurance provider with any questions.    If during the course of the call the physician/provider feels a telephone visit is not appropriate, you will not be charged for this service.\"    Patient has given verbal consent for Telephone visit?  Yes    What phone number would you like to be contacted at? [phone]     How would you like to obtain your AVS? Mark    Phone call duration: 18 minutes    BRINDA Brock      "

## 2020-10-12 NOTE — PROGRESS NOTES
Left voicemail for patient to call back to set up telemedicine visit, will call again before appointment time.  Radha Claudio CMA on 10/12/2020 at 1:19 PM

## 2020-10-12 NOTE — PROGRESS NOTES
[facility]  Transplant Infectious Disease Clinic Note:  New Patient     Patient:  Joesph Zeng, Date of birth 1970, Medical record number [id number]  Date of Visit:  10/12/2020  Consult requested by Dr. Fraser for evaluation of Hepatitis B         Assessment and Recommendations:   Recommendations:  - Check Hep B s Ag, Hep B s Ab, HBV DNA  - Check anti-Hep D Ab (against delta virus)  - Check HCV Ab (and HCV RNA if positive) and HIV Ag/Ab test  - Repeat LFTs and coagulation studies  - Start Tenofovir (TAF, Vemlidy) 25 mg PO daily - recommend repeating HBV DNA and LFTs at least every 3 months while on therapy  - Check Hep B s Ag every year    Assessment:  49 y/o female with intrahepatic cholangiocarcinoma now on palliative chemotherapy with Gemcitabine and Cisplatin who is referred to ID for management of Hepatitis B    #Chronic Hepatitis B, Hep e negative, concern for reactivation:  Patient reports being a hepatitis B carrier (reportedly was diagnosed as such over a decade ago). Now with worsening transaminases in the presence of a detectable HBV viral load/DNA. For Hepatitis B e Ag negative patients,  Serum HBV DNA >2,000 IU/mL along with intermittently or persistently elevated ALT/AST concerning for immune active chronic hepatitis B. Per AASLD guidelines, treatment is recommended when HBV DNA > 2000 and AST/ALT > 2 x ULN (upper limit normal). For this patient, progressive worsening of LFTs after starting chemotherapy concerning for reactivation/flare of Hep B. In this treatment naive patient, would recommend starting Tenofovir (TAF/Vemlidy) 25mg daily PO along with close monitoring of her enzymes. Would repeat BMP/LFTs and hepatitis markers and would also test for Hep C and HIV as well as delta virus (patient comes from  [place]).  Anticipate prolonged (indefinite, likely lifelong) therapy for her    #Intrahepatic Cholangiocarcinoma:  - ON gemcitabine and Cisplatin  - Management per  "Oncology team    F/u 1 month  BRINDA Brock  [phone]         History of the Infectious Disease lllness:     49 y/o female, PMHx depression, PTSD, surgical history of cholecystectomy who was recently diagnosed with intrahepatic cholangiocarcinoma referred to ID for management of Hepatitis B    Patient first started having symptoms in October 2019 with nausea, constipation and abdominal bloating. She reported worsening GI symptoms and a near 30 lbs weight loss. Abdominal US 7/10/20 followed by CT abdomen/pelvis 7/13/20 showed large hypoattenuating liver mass along with necrotic lymph node in the aortocaval region, a subsequent biopsy confirmed diagnosis of intrahepatic cholangiocarcinoma.  Was subsequently started on palliative chemotherapy with Gemcitabine and Cisplatin    Prior to starting chemotherapy, patient was tested for Hepatitis B - found to have a low level viremia - 6126 copies (log 3.8), a negative Hepatitis B e Ag and a positive Hepatitis B e Ab  Patient followed up with ID today    Since starting chemotherapy, she reports feeling very tired and fatigued overall  Reports ongoing nausea and vomiting  Does report occasionally feeling feverish with some chills and rigors in the evenings  Persistent bloating and abdominal discomfort, particularly on the right and upper parts of her abdomen  Is still constipated, and occasionally has blood in stools  Denies cough, occasional SOB    Denies urinary symptoms  Denies skin rash, joint pain/swelling    Review of patient's labs shows that her transaminases have increased (on labs from 10/8, , )    Of note, patient was born in [facility] and immigrated to the [place] in [place] when she was 5. She was told she was a \"Hepatitis B carrier\" but never received treatment for the same  She mentions that her brothers are also hepatitis \"carriers\" and that one of them has been treated for the same  Denies knowledge of HCV/HIV testing  Lives at home with  and " kids  No pets  No allergies  Does not drink or smoke, denies IVJHONY  Has not worked for over 10 years    Review of Systems:  CONSTITUTIONAL:  No fevers or chills. No night sweats.  EYES: negative for icterus or acute vision changes.   ENT:  negative for hearing loss, tinnitus or sore throat  RESPIRATORY:  negative for cough, sputum, dyspnea  CARDIOVASCULAR:  negative for chest pain, heart palpitations  GASTROINTESTINAL:  negative for nausea, vomiting, diarrhea or constipation  GENITOURINARY:  negative for dysuria or hematuria.  HEME:  No easy bruising or bleeding  INTEGUMENT:  negative for rash or pruritus  NEURO:  Negative for headache or tremor.    Past Medical History:   Diagnosis Date     Allergic rhinitis        Past Surgical History:   Procedure Laterality Date     CHOLECYSTECTOMY  2001     ESOPHAGOSCOPY, GASTROSCOPY, DUODENOSCOPY (EGD), COMBINED N/A 8/3/2020    Procedure: ESOPHAGOGASTRODUODENOSCOPY, WITH BIOPSY;  Surgeon: Kendrick Santizo MD;  Location: [facility] OR       Family History   Problem Relation Age of Onset     Diabetes Mother      Hypertension Mother      Suicide Father 35       Social History     Social History Narrative     Not on file     Social History     Tobacco Use     Smoking status: Never Smoker     Smokeless tobacco: Never Used   Substance Use Topics     Alcohol use: No     Drug use: No       Immunization History   Administered Date(s) Administered     [id number] Novel Flu 04/22/2010     [id number] Novel Flu P-free 11/06/2009, 11/17/2009     HepB-Adult 01/21/2005     Influenza (H1N1) 11/17/2009     Influenza (IIV3) PF 11/12/2007, 10/06/2009     MMR 01/21/2005     Td (Adult), Adsorbed 01/21/2005       Patient Active Problem List   Diagnosis     Morbid obesity (H)     Panic disorder with agoraphobia     PTSD (post-traumatic stress disorder)     Seasonal affective disorder (H)     Severe recurrent major depressive disorder with psychotic features (H)     Vitamin D deficiency     Diabetes mellitus, type 2  (H)     Urinary incontinence, unspecified type     Hyperglycemia     Constipation, unspecified constipation type      Family history of diabetes mellitus     Elevated LFTs     Hepatitis B carrier (H)     Primary malignant neoplasm of biliary tract (H)       No outpatient medications have been marked as taking for the 10/12/20 encounter (Virtual Visit) with Elmer Dixon MD.       No Known Allergies           Physical Exam:   Vitals were reviewed.  All vitals stable  LMP  (LMP Unknown)   Wt Readings from Last 4 Encounters:   10/08/20 99.9 kg (220 lb 4.8 oz)   10/02/20 100.9 kg (222 lb 8 oz)   09/21/20 101.2 kg (223 lb)   09/18/20 102.8 kg (226 lb 9.6 oz)       Exam:  Unable to be performed as visit was conducted over the telephone         Laboratory Data:     Metabolic Studies    Recent Labs   Lab Test 10/08/20  1140 10/02/20  0900 09/18/20  0740    140 139   POTASSIUM 3.7 3.5 3.3*   CHLORIDE 105 106 106   CO2 30 29 27   ANIONGAP 4 5 6   BUN 10 12 9   CR 0.66 0.72 0.55   GFRESTIMATED >90 >90 >90   * 104* 101*   IMELDA 8.9 8.4* 8.5   MAG 2.1 2.2 2.1       Hepatic Studies    Recent Labs   Lab Test 10/08/20  1140 10/02/20  0900 09/18/20  0740   BILITOTAL 1.3 0.7 1.1   ALKPHOS 152* 153* 132   PROTTOTAL 8.5 8.0 7.8   ALBUMIN 2.8* 2.9* 3.0*   * 124* 70*   * 116* 78*     Hematology Studies     Recent Labs   Lab Test 10/08/20  1140 10/02/20  0900 09/18/20  0740 09/02/20  0800   WBC 3.5* 4.8 4.1 4.1   ANEU 2.0 3.2 2.6 2.4   ALYM 1.1 1.0 0.9 1.1   OLIVIA 0.4 0.5 0.5 0.4   AEOS 0.1 0.1 0.1 0.1   HGB 13.2 12.5 12.3 12.5   HCT 40.9 38.1 37.5 38.8   * 68* 83* 114*       Clotting Studies    Recent Labs   Lab Test 07/31/20  1140   INR 1.19*       Urine Studies     Recent Labs   Lab Test 03/05/20  1117   URINEPH 6.0   NITRITE Negative   LEUKEST Small*   WBCU 5-10*       Microbiology:  Last Culture results with specimen source  Culture Micro   Date Value Ref Range Status   03/05/2020   Final     10,000 to 50,000 colonies/mL  mixed urogenital rasheed  Susceptibility testing not routinely done      Specimen Description   Date Value Ref Range Status   03/05/2020 Midstream Urine  Final        Quantiferon testing   Recent Labs   Lab Test 10/08/20  1140 10/02/20  0900   LYMPH 30.2 20.3     Virology:  Respiratory virus testing    Recent Labs   Lab Test 08/01/20  1624 07/25/20  1050   [id number] Nasopharyngeal Nasopharyngeal   SARSCOVRES NEGATIVE NEGATIVE       Hepatitis B Testing     Recent Labs   Lab Test 08/10/20  1018 08/07/20  1441   HBQRES 6,126* Canceled, Test credited     Was the last Hepatitis B E antigen positive?   Hepatitis Be Agn   Date Value Ref Range Status   08/10/2020 Negative Negative Final     Comment:     (Note)  Performed by [facility],  [address] [place] [id number] [phone]  [email or web address], Noe Leslie MD, Lab. Director       Imaging:    CT Chest w/ contrast (7/24):  IMPRESSION:   1. No focal airspace opacity. Scattered right calcified pulmonary  granulomas. No suspicious pulmonary nodules.     2. Redemonstration of heterogeneous hypodense right hepatic mass  measuring up to 6.5 cm which appears to abut the patent common hepatic  artery, better appreciated on CT abdomen and pelvis 7/13/2020.    CT Abdomen/Pelvis w/contrast (7/13):  IMPRESSION:  1. Large mass at the caudate lobe of the liver surrounding the portal  vein and there is an associated mass in the inferior right lobe of the  liver and significant necrotic lymphadenopathy in the retroperitoneum.  This is most consistent with malignancy. This could represent primary  hepatic origin, but could also represent metastasis although no other  primary lesions are seen.  2. Mild prominence of the wall of the gastric antrum/pylorus likely  represents a normal finding in this patient. Neoplastic infiltration  is considered less likely.                   Joesph Zeng is a 50 year old female who is being evaluated via a billable  "telephone visit.      The patient has been notified of following:     \"This telephone visit will be conducted via a call between you and your physician/provider. We have found that certain health care needs can be provided without the need for a physical exam.  This service lets us provide the care you need with a short phone conversation.  If a prescription is necessary we can send it directly to your pharmacy.  If lab work is needed we can place an order for that and you can then stop by our lab to have the test done at a later time.    Telephone visits are billed at different rates depending on your insurance coverage. During this emergency period, for some insurers they may be billed the same as an in-person visit.  Please reach out to your insurance provider with any questions.    If during the course of the call the physician/provider feels a telephone visit is not appropriate, you will not be charged for this service.\"    Patient has given verbal consent for Telephone visit?  Yes    What phone number would you like to be contacted at? [phone]     How would you like to obtain your AVS? Mark    Phone call duration: 18 minutes    BRINDA Brock    "

## 2020-10-12 NOTE — PROGRESS NOTES
Left voicemail for patient to call back to set up telemedicine visit, will call again before appointment time.  Radha Claudio CMA on 10/12/2020 at 1:36 PM

## 2020-10-12 NOTE — PATIENT INSTRUCTIONS
Repeat blood work in 1-2 days    Start taking Vemlidy once a day as prescribed, have called in medication to pharmacy    Follow up in 1 month

## 2020-10-23 NOTE — LETTER
10/23/2020         RE: Joesph Zeng  [address]  [place] [address]        Dear Colleague,    Thank you for referring your patient, Joesph Zeng, to the [facility]. Please see a copy of my visit note below.    [facility]  MEDICAL ONCOLOGY    RETURN PATIENT VISIT NOTE    Reason for visit: Intrahepatic cholangiocarcinoma     Oncology treatment summary  1.  Began having symptoms October 2019 with abdominal bloating and constipation with nausea.  Eventually she had a 30 pound weight loss with increasing GI symptoms  2.  Abdominal ultrasound 7/10/2020 with a large mass in the upper abdomen along with a large liver mass  3.  CT abdomen pelvis performed 7/13/2020 with a large hypoattenuating mass in the inferior right lobe of the liver (segment 6) measuring up to 6.5 cm.  Additionally there was a mass in the caudate lobe of the liver which appeared to surround the portal vein.  This measures 6 cm.  There was a large hypoattenuating necrotic lymph node in the aortocaval region measuring 3.5 cm along with other retroperitoneal adenopathy seen posterior to the left renal vein measuring up to 2.6 cm  4. Bx with EBV+ adenocarcinoma, intrahepatic choleangiocarcinoma. PDL! CPS 7, pMMR, NTRK-, MET-, FGFR2 pending  5. 8/18/2020 began pallaitive gemzar/cisplatin     HISTORY OF PRESENTING ILLNESS  Patient is here for a face-to-face visit today.  She is here for cycle 3 day 1 of treatment. She is about the same. She is tired in the mornings and doesn't feel she sleeps well at night. Her abdominal pain is about the same. As is her nausea. She takes oxycodone which helps with the pain and compazine helps with the nausea. She has no neuropathy. She has seen ID and was started and antivirals.     PAST MEDICAL HISTORY  Depression, PTSD, cholecystectomy, hepatitis b      CURRENT OUTPATIENT MEDICATIONS  Current Outpatient Medications   Medication     oxyCODONE (ROXICODONE)  5 MG tablet     docusate sodium (COLACE) 100 MG capsule     FLUoxetine 20 MG tablet     lidocaine-prilocaine (EMLA) 2.5-2.5 % external cream     LORazepam (ATIVAN) 0.5 MG tablet     mirtazapine (REMERON) 15 MG tablet     ondansetron (ZOFRAN) 8 MG tablet     ondansetron (ZOFRAN) 8 MG tablet     ondansetron (ZOFRAN-ODT) 4 MG ODT tab     polyethylene glycol (MIRALAX) powder     prochlorperazine (COMPAZINE) 10 MG tablet     prochlorperazine (COMPAZINE) 5 MG tablet     risperiDONE (RISPERDAL) 1 MG tablet     No current facility-administered medications for this visit.      Facility-Administered Medications Ordered in Other Visits   Medication     0.9% sodium chloride BOLUS     CISplatin (PLATINOL) 45 mg in sodium chloride 0.9 % 320 mL infusion     gemcitabine (GEMZAR) 1,800 mg in sodium chloride 0.9 % 322 mL infusion     heparin lock flush 10 UNIT/ML injection 5 mL     ondansetron (ZOFRAN) 8 mg, dexamethasone (DECADRON) 12 mg in sodium chloride 0.9 % 60 mL intermittent infusion     sodium chloride 0.9 % 1,000 mL with potassium chloride 20 mEq, magnesium sulfate 2 g infusion        ALLERGIES   No Known Allergies     SOCIAL HISTORY  No tobacco use, she is single and has a fiancé.  She lives in [place].  She has 7 children.  She works at home.  No tobacco or excessive alcohol use.     FAMILY HISTORY  No malignancies in her family history that she is aware of     REVIEW OF SYSTEMS  Review Of Systems  Skin: negative  Eyes: negative  Ears/Nose/Throat: negative  Respiratory: No shortness of breath, dyspnea on exertion, cough, or hemoptysis  Cardiovascular: negative  Gastrointestinal: negative per HPI  Genitourinary: negative  Musculoskeletal: negative  Neurologic: negative  Psychiatric: negative  Hematologic/Lymphatic/Immunologic: negative  Endocrine: negative      PHYSICAL EXAM  Physical Exam  Constitutional:       Appearance: Normal appearance.   HENT:      Head: Normocephalic and atraumatic.      Nose: Nose normal.    Eyes:      Extraocular Movements: Extraocular movements intact.      Conjunctiva/sclera: Conjunctivae normal.      Pupils: Pupils are equal, round, and reactive to light.   Neck:      Musculoskeletal: Normal range of motion and neck supple.   Cardiovascular:      Rate and Rhythm: Normal rate and regular rhythm.   Pulmonary:      Effort: Pulmonary effort is normal.      Breath sounds: Normal breath sounds.   Abdominal:      General: Abdomen is flat. Bowel sounds are normal.      Palpations: Abdomen is soft.   Musculoskeletal: Normal range of motion.   Skin:     General: Skin is warm and dry.   Neurological:      General: No focal deficit present.      Mental Status: She is alert and oriented to person, place, and time. Mental status is at baseline.   Psychiatric:         Mood and Affect: Mood normal.         Behavior: Behavior normal.         Thought Content: Thought content normal.         Judgment: Judgment normal.       .        LABORATORY AND IMAGING STUDIES  Recent Labs   Lab Test 09/18/20  0740 09/02/20  0800 08/26/20  0805 08/14/20  1555 07/02/20  1402    138 137 137 138   POTASSIUM 3.3* 3.6 3.5 3.9 3.9   CHLORIDE 106 107 105 105 102   CO2 27 28 29 25 29   ANIONGAP 6 3 3 7 7   BUN 9 12 12 19 10   CR 0.55 0.72 0.77 0.74 0.80   * 109* 120* 110* 97   IMELDA 8.5 8.3* 8.8 9.2 9.0     Recent Labs   Lab Test 09/18/20  0740 09/02/20  0800 08/26/20  0805   MAG 2.1 2.3 2.3     Recent Labs   Lab Test 09/18/20  0740 09/02/20  0800 08/26/20  0805 08/14/20  1555 07/31/20  1140   WBC 4.1 4.1 4.0 6.2 4.6   HGB 12.3 12.5 13.4 14.9 13.5   PLT 83* 114* 65* 125* 115*   MCV 91 90 88 89 91   NEUTROPHIL 63.4 60.3 60.0 69.3 63.9     Recent Labs   Lab Test 09/18/20  0740 09/02/20  0800 08/26/20  0805   BILITOTAL 1.1 0.8 0.7   ALKPHOS 132 158* 124   ALT 78* 80* 129*   AST 70* 71* 68*   ALBUMIN 3.0* 3.0* 3.2*     TSH   Date Value Ref Range Status   03/05/2020 3.20 0.40 - 4.00 mU/L Final     No results for input(s): CEA in  the last [id number] hours.  Results for orders placed or performed during the hospital encounter of 07/31/20   CT Liver Biopsy    Narrative    PROCEDURES 7/31/2020:  1. CT guided percutaneous biopsy.     Clinical History: Liver masses. Biopsy requested.    Comparisons: CT abdomen pelvis 7/13/2020    Staff Radiologist: Arturo Villalta MD    I, ARTURO VILLALTA MD, attest that I was present for all critical  portions of the procedure and was immediately available to provide  guidance and assistance during the remainder of the procedure.    Fellow: Favian Helton MD    Resident: Aleksey Hernandez MD    Medications: The patient was placed on continuous monitoring.  Intravenous sedation was administered. 100 mcg fentanyl and 2 mg  Versed administered intravenously. Total sedation time was 25 minutes.  Vital signs and sedation monitored by nursing staff under  Interventional Radiologists supervision. The patient remained stable  throughout the procedure.    Dose: 1362 mGycm.    PROCEDURE: The patient understood the limitations, alternatives, and  risks of the procedure and requested the procedure be performed. Both  written and oral consent were obtained.    Limited pre-procedural scan performed demonstrated adequate lesion  size and position for biopsy.    The right flank was prepped and draped in the usual sterile fashion.  Ten to one volume mixture of 1% lidocaine without epinephrine was used  for local anesthesia.     Under CT guidance, a 17 gauge coaxial introducer needle was advanced  into the right hepatic lobe tumor. Six 18 gauge core biopsies were  obtained with a Breadcrumbtracking biopsy gun and submitted to Pathology.    Good hemostasis was achieved. Sterile dressing applied.    Limited post-procedural scan demonstrated no immediate complication.       Impression    IMPRESSION:   1. Six 18 gauge core biopsies obtained of the right hepatic lobe tumor  under CT-guidance.    I have personally reviewed the examination and initial  interpretation  and I agree with the findings.    ARTURO VILLALTA MD            ASSESSMENT  AND RECOMMENDATIONS:  1.  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  History of hepatitis B   3.  Depression  4.  Chemotherapy induced thrombocytopenia    Plan  1. Cycle 3 day 1 of gem/cis. We are doing a Day 1,15 every 28 day cycle now due to her thrombocytopenia  2. CT AP week of 11/9 and see me later that week for results  3. Refilled oxycodone  4. Discussed options to help with her sleep and I would increase her miratzapine to 30mg, however there is an interaction with her prozac and we discussed that should get recommendations from her psychiatrist for how to manage her insomina given the antidepressants she is on (prozac, mirtaxapine, respiridol)    Laurie Fraser MD on 10/23/2020 at 8:59 AM          Again, thank you for allowing me to participate in the care of your patient.        Sincerely,        Laurie Fraser MD

## 2020-10-23 NOTE — NURSING NOTE
"Oncology Rooming Note    October 23, 2020 8:06 AM   Joesph Zeng is a 50 year old female who presents for:    Chief Complaint   Patient presents with     Oncology Clinic Visit     Return visit     Initial Vitals: BP 96/64   Pulse 73   Temp 98.3  F (36.8  C) (Oral)   Wt 99.3 kg (219 lb)   LMP  (LMP Unknown)   SpO2 98%   BMI 37.59 kg/m   Estimated body mass index is 37.59 kg/m  as calculated from the following:    Height as of 8/7/20: 1.626 m (5' 4\").    Weight as of this encounter: 99.3 kg (219 lb). Body surface area is 2.12 meters squared.  Severe Pain (6) Comment: Data Unavailable   No LMP recorded (lmp unknown). Patient is postmenopausal.  Allergies reviewed: Yes  Medications reviewed: Yes    Medications: MEDICATION REFILLS NEEDED TODAY. Provider was notified.  Pharmacy name entered into [facility]:    [id number] - [place], [place] - [address]  [id number] - [place] [place] - [address].    Clinical concerns: none Dr Fraser was notified.      Shoaib Ruffin RN              "

## 2020-10-23 NOTE — PROGRESS NOTES
Infusion Nursing Note:  Joesph Zeng presents today for C3 D1  Cisplatin/ Gemzar.    Patient seen by provider today: Yes: Dr. Fraser   present during visit today: Not Applicable.    Note: Patient stated that she really is struggling with fatigue, low appetite, constipation and weakness.  Encouraged her to remain as active as she can which will help fatigue and constipation.  Discussed water consumption and good protein choices.      Patient wanted her 10/26 IVF appointment cancelled. Future ID labs drawn today. New treatment days going forward are going to be day 1 and day 15 per Dr. Fraser today. Patient aware but did not stop and verify new dates with schedulers.  Spoke with Zaid, Pharmacist to update treatment plan to reflect these changes and will work with schedulers to get patient scheduled appropriately.     Intravenous Access:  Implanted Port.    Treatment Conditions:  Lab Results   Component Value Date    HGB 12.1 10/23/2020     Lab Results   Component Value Date    WBC 4.2 10/23/2020      Lab Results   Component Value Date    ANEU 2.6 10/23/2020     Lab Results   Component Value Date    PLT 80 10/23/2020      Lab Results   Component Value Date     10/23/2020                   Lab Results   Component Value Date    POTASSIUM 3.5 10/23/2020           Lab Results   Component Value Date    MAG 1.9 10/23/2020            Lab Results   Component Value Date    CR 0.58 10/23/2020                   Lab Results   Component Value Date    IMELDA 7.9 10/23/2020                Lab Results   Component Value Date    BILITOTAL 2.0 10/23/2020           Lab Results   Component Value Date    ALBUMIN 2.3 10/23/2020                    Lab Results   Component Value Date     10/23/2020           Lab Results   Component Value Date     10/23/2020       Results reviewed, labs MET treatment parameters, ok to proceed with treatment.      Post Infusion Assessment:  Patient tolerated infusion without  incident.  Blood return noted pre and post infusion.  Site patent and intact, free from redness, edema or discomfort.  No evidence of extravasations.  Access discontinued per protocol.       Discharge Plan:   Discharge instructions reviewed with: Patient.  Patient and/or family verbalized understanding of discharge instructions and all questions answered.  Patient discharged in stable condition accompanied by: self.  Departure Mode: Ambulatory.    Meenu Patel RN

## 2020-10-23 NOTE — PROGRESS NOTES
"Patient's name and  were verified.  See Doc Flowsheet - IV assess for details.  IVAD accessed with 20G 3/4\" hernandez gripper plus needle  blood return positive: YES  Site without redness, tenderness or swelling: YES  flushed with 30cc NS and 5cc 100u/ml heparin  Needle: left accessed for chemotherapy  Comments: Labs drawn.  Patient tolerated procedure without incident.    Shoaib Ruffin  RN, BSN    "

## 2020-10-23 NOTE — PROGRESS NOTES
[facility] PHYSICIANS  MEDICAL ONCOLOGY    RETURN PATIENT VISIT NOTE    Reason for visit: Intrahepatic cholangiocarcinoma     Oncology treatment summary  1.  Began having symptoms October 2019 with abdominal bloating and constipation with nausea.  Eventually she had a 30 pound weight loss with increasing GI symptoms  2.  Abdominal ultrasound 7/10/2020 with a large mass in the upper abdomen along with a large liver mass  3.  CT abdomen pelvis performed 7/13/2020 with a large hypoattenuating mass in the inferior right lobe of the liver (segment 6) measuring up to 6.5 cm.  Additionally there was a mass in the caudate lobe of the liver which appeared to surround the portal vein.  This measures 6 cm.  There was a large hypoattenuating necrotic lymph node in the aortocaval region measuring 3.5 cm along with other retroperitoneal adenopathy seen posterior to the left renal vein measuring up to 2.6 cm  4. Bx with EBV+ adenocarcinoma, intrahepatic choleangiocarcinoma. PDL! CPS 7, pMMR, NTRK-, MET-, FGFR2 pending  5. 8/18/2020 began pallaitive gemzar/cisplatin     HISTORY OF PRESENTING ILLNESS  Patient is here for a face-to-face visit today.  She is here for cycle 3 day 1 of treatment. She is about the same. She is tired in the mornings and doesn't feel she sleeps well at night. Her abdominal pain is about the same. As is her nausea. She takes oxycodone which helps with the pain and compazine helps with the nausea. She has no neuropathy. She has seen ID and was started and antivirals.     PAST MEDICAL HISTORY  Depression, PTSD, cholecystectomy, hepatitis b      CURRENT OUTPATIENT MEDICATIONS  Current Outpatient Medications   Medication     oxyCODONE (ROXICODONE) 5 MG tablet     docusate sodium (COLACE) 100 MG capsule     FLUoxetine 20 MG tablet     lidocaine-prilocaine (EMLA) 2.5-2.5 % external cream     LORazepam (ATIVAN) 0.5 MG tablet     mirtazapine (REMERON) 15 MG tablet     ondansetron (ZOFRAN) 8 MG tablet      ondansetron (ZOFRAN) 8 MG tablet     ondansetron (ZOFRAN-ODT) 4 MG ODT tab     polyethylene glycol (MIRALAX) powder     prochlorperazine (COMPAZINE) 10 MG tablet     prochlorperazine (COMPAZINE) 5 MG tablet     risperiDONE (RISPERDAL) 1 MG tablet     No current facility-administered medications for this visit.      Facility-Administered Medications Ordered in Other Visits   Medication     0.9% sodium chloride BOLUS     CISplatin (PLATINOL) 45 mg in sodium chloride 0.9 % 320 mL infusion     gemcitabine (GEMZAR) 1,800 mg in sodium chloride 0.9 % 322 mL infusion     heparin lock flush 10 UNIT/ML injection 5 mL     ondansetron (ZOFRAN) 8 mg, dexamethasone (DECADRON) 12 mg in sodium chloride 0.9 % 60 mL intermittent infusion     sodium chloride 0.9 % 1,000 mL with potassium chloride 20 mEq, magnesium sulfate 2 g infusion        ALLERGIES   No Known Allergies     SOCIAL HISTORY  No tobacco use, she is single and has a fiancé.  She lives in [place].  She has 7 children.  She works at home.  No tobacco or excessive alcohol use.     FAMILY HISTORY  No malignancies in her family history that she is aware of     REVIEW OF SYSTEMS  Review Of Systems  Skin: negative  Eyes: negative  Ears/Nose/Throat: negative  Respiratory: No shortness of breath, dyspnea on exertion, cough, or hemoptysis  Cardiovascular: negative  Gastrointestinal: negative per HPI  Genitourinary: negative  Musculoskeletal: negative  Neurologic: negative  Psychiatric: negative  Hematologic/Lymphatic/Immunologic: negative  Endocrine: negative      PHYSICAL EXAM  Physical Exam  Constitutional:       Appearance: Normal appearance.   HENT:      Head: Normocephalic and atraumatic.      Nose: Nose normal.   Eyes:      Extraocular Movements: Extraocular movements intact.      Conjunctiva/sclera: Conjunctivae normal.      Pupils: Pupils are equal, round, and reactive to light.   Neck:      Musculoskeletal: Normal range of motion and neck supple.    Cardiovascular:      Rate and Rhythm: Normal rate and regular rhythm.   Pulmonary:      Effort: Pulmonary effort is normal.      Breath sounds: Normal breath sounds.   Abdominal:      General: Abdomen is flat. Bowel sounds are normal.      Palpations: Abdomen is soft.   Musculoskeletal: Normal range of motion.   Skin:     General: Skin is warm and dry.   Neurological:      General: No focal deficit present.      Mental Status: She is alert and oriented to person, place, and time. Mental status is at baseline.   Psychiatric:         Mood and Affect: Mood normal.         Behavior: Behavior normal.         Thought Content: Thought content normal.         Judgment: Judgment normal.       .        LABORATORY AND IMAGING STUDIES  Recent Labs   Lab Test 09/18/20  0740 09/02/20  0800 08/26/20  0805 08/14/20  1555 07/02/20  1402    138 137 137 138   POTASSIUM 3.3* 3.6 3.5 3.9 3.9   CHLORIDE 106 107 105 105 102   CO2 27 28 29 25 29   ANIONGAP 6 3 3 7 7   BUN 9 12 12 19 10   CR 0.55 0.72 0.77 0.74 0.80   * 109* 120* 110* 97   IMELDA 8.5 8.3* 8.8 9.2 9.0     Recent Labs   Lab Test 09/18/20  0740 09/02/20  0800 08/26/20  0805   MAG 2.1 2.3 2.3     Recent Labs   Lab Test 09/18/20  0740 09/02/20  0800 08/26/20  0805 08/14/20  1555 07/31/20  1140   WBC 4.1 4.1 4.0 6.2 4.6   HGB 12.3 12.5 13.4 14.9 13.5   PLT 83* 114* 65* 125* 115*   MCV 91 90 88 89 91   NEUTROPHIL 63.4 60.3 60.0 69.3 63.9     Recent Labs   Lab Test 09/18/20  0740 09/02/20  0800 08/26/20  0805   BILITOTAL 1.1 0.8 0.7   ALKPHOS 132 158* 124   ALT 78* 80* 129*   AST 70* 71* 68*   ALBUMIN 3.0* 3.0* 3.2*     TSH   Date Value Ref Range Status   03/05/2020 3.20 0.40 - 4.00 mU/L Final     No results for input(s): CEA in the last 84783 hours.  Results for orders placed or performed during the hospital encounter of 07/31/20   CT Liver Biopsy    Narrative    PROCEDURES 7/31/2020:  1. CT guided percutaneous biopsy.     Clinical History: Liver masses. Biopsy  requested.    Comparisons: CT abdomen pelvis 7/13/2020    Staff Radiologist: Arturo Villalta MD    I, ARTURO VILLALTA MD, attest that I was present for all critical  portions of the procedure and was immediately available to provide  guidance and assistance during the remainder of the procedure.    Fellow: Favian Helton MD    Resident: Aleksey Hernandez MD    Medications: The patient was placed on continuous monitoring.  Intravenous sedation was administered. 100 mcg fentanyl and 2 mg  Versed administered intravenously. Total sedation time was 25 minutes.  Vital signs and sedation monitored by nursing staff under  Interventional Radiologists supervision. The patient remained stable  throughout the procedure.    Dose: 1362 mGycm.    PROCEDURE: The patient understood the limitations, alternatives, and  risks of the procedure and requested the procedure be performed. Both  written and oral consent were obtained.    Limited pre-procedural scan performed demonstrated adequate lesion  size and position for biopsy.    The right flank was prepped and draped in the usual sterile fashion.  Ten to one volume mixture of 1% lidocaine without epinephrine was used  for local anesthesia.     Under CT guidance, a 17 gauge coaxial introducer needle was advanced  into the right hepatic lobe tumor. Six 18 gauge core biopsies were  obtained with a [employer] biopsy gun and submitted to Pathology.    Good hemostasis was achieved. Sterile dressing applied.    Limited post-procedural scan demonstrated no immediate complication.       Impression    IMPRESSION:   1. Six 18 gauge core biopsies obtained of the right hepatic lobe tumor  under CT-guidance.    I have personally reviewed the examination and initial interpretation  and I agree with the findings.    ARTURO VILLALTA MD            ASSESSMENT  AND RECOMMENDATIONS:  1.  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  History of hepatitis B   3.   Depression  4.  Chemotherapy induced thrombocytopenia    Plan  1. Cycle 3 day 1 of gem/cis. We are doing a Day 1,15 every 28 day cycle now due to her thrombocytopenia  2. CT AP week of 11/9 and see me later that week for results  3. Refilled oxycodone  4. Discussed options to help with her sleep and I would increase her miratzapine to 30mg, however there is an interaction with her prozac and we discussed that should get recommendations from her psychiatrist for how to manage her insomina given the antidepressants she is on (prozac, mirtaxapine, respiridol)    Laurie Fraser MD on 10/23/2020 at 8:59 AM

## 2020-11-05 NOTE — PROGRESS NOTES
Port needle left accessed for treatment. Tolerated port access and draw without complaint. Port site scrubbed with Chloraprep for 30 seconds and allowed to dry completely prior to dressing application. Accessed using sterile technique. [place] tubes drawn-Red gel/Green/Purple tubes. Double signed by patient and RN. See documentation flowsheet. Anamika Leiva, RN, BSN, OCN

## 2020-11-09 NOTE — PROGRESS NOTES
Patient's Oxycodone Rx was refilled earlier.  Patient states she wanted to discuss with Dr. Fraser that the current dose of 5 mg is not controlling her pain well enough and at times she takes two tablets.  She also had her CT scan and wanted to know about results.  Writer did note results in chart and did let patient know that the liver mass was decreased and Dr. Fraser would review more thoroughly with her this Friday during office visit.  Writer advised will pass message along to Dr. Fraser so she is aware and also advised to discuss this Friday.

## 2020-11-11 NOTE — PROGRESS NOTES
Social Work Intervention  [facility]    Data/Intervention:    Patient Name:  Joesph Zeng  /Age:  1970 (50 year old)    Visit Type: in person  Referral Source: Infusion Meenu TIRADO  Reason for Referral:  Support, patient tearful    Collaborated With:    CELESTINO Pink    Patient Concerns/Issues:   Deferred C3 D15 Cisplatin/Gemzar    Intervention/Education/Resources Provided:  SW received referral from infusion RN that Joesph was tearful and depressed. Requested SW meet with her and provide support.    ALFREDITO met with Joesph in the infusion center this afternoon. She was very sleepy and though she was able to engage, her eyes kept drifting closed. ALFREDITO and Joesph spoke a bit about her history of depression. She reports she has been working with her therapist for about ten years and does also have a psychiatrist she meets with regularly. Joesph is struggling and this is not entirely new for her, with a long history of depression reportedly starting after the birth of her twins. ALFREDITO asked about Joesph's safety, she confirms she does not feel at risk of harm to self/others, just very tired much of the time. She does report having support at home. Joesph confirms she has psychiatry appointment scheduled for next week (), sw encouraged her to keep speaking to her providers. ALFREDITO also spoke to Joesph about our oncology psychologist as an additional resource, but she states she is not interested.     Supportive counseling offered. ALFREDITO suggested she could keep checking in on Joesph periodically at her appointments and Joesph was agreeable. SW wrapped up as Joesph was struggling to stay awake.     Assessment/Plan:  SW will continue to follow and provide support and assistance as needed and appropriate.     Provided patient/family with contact information and availability.    ROC Solorzano, [facility]  Clinical , Adult Oncology  Phone: [phone]

## 2020-11-11 NOTE — PROGRESS NOTES
"Infusion Nursing Note:  Joesph Zeng presents today for Deferred C3 D15 Cisplatin/Gemzar.    Patient seen by provider today: No   present during visit today: Not Applicable.    Note: Patient presenting today being very tearful, fatigued, and upset that she is still having so many side effects.  States several times that   \"I don't know how much longer I can do this- I'm fighting for my family.\"     Spoke with Cyndee Severino NP about patients potassium level. Stated that no additional supplement was needed due to potassium in her IVF bag.  Asked Laurie our  to see her today too.  In basket sent to Dr. Fraser explaining patient's symptoms and statements today.       Intravenous Access:  Implanted Port.    Treatment Conditions:  Lab Results   Component Value Date    HGB 12.0 11/11/2020     Lab Results   Component Value Date    WBC 3.4 11/11/2020      Lab Results   Component Value Date    ANEU 2.1 11/11/2020     Lab Results   Component Value Date    PLT 99 11/11/2020      Lab Results   Component Value Date     11/11/2020                   Lab Results   Component Value Date    POTASSIUM 3.3 11/11/2020           Lab Results   Component Value Date    MAG 1.8 11/11/2020            Lab Results   Component Value Date    CR 0.74 11/11/2020                   Lab Results   Component Value Date    IMELDA 8.1 11/11/2020                Lab Results   Component Value Date    BILITOTAL 1.4 11/11/2020           Lab Results   Component Value Date    ALBUMIN 2.2 11/11/2020                    Lab Results   Component Value Date    ALT 38 11/11/2020           Lab Results   Component Value Date    AST 53 11/11/2020       Results reviewed, labs MET treatment parameters, ok to proceed with treatment.      Post Infusion Assessment:  Patient tolerated infusion without incident.  Blood return noted pre and post infusion.  Site patent and intact, free from redness, edema or discomfort.  No evidence of " extravasations.  Access discontinued per protocol.       Discharge Plan:   Discharge instructions reviewed with: Patient.  Patient and/or family verbalized understanding of discharge instructions and all questions answered.  Patient discharged in stable condition accompanied by: self.  Departure Mode: Ambulatory.    Meenu Patel RN

## 2020-11-13 NOTE — LETTER
11/13/2020         RE: Joepsh Zeng  [address]  [facility] [address]        Dear Colleague,    Thank you for referring your patient, Joesph Zeng, to the [facility]. Please see a copy of my visit note below.    [facility] PHYSICIANS  MEDICAL ONCOLOGY    RETURN PATIENT VISIT NOTE    Reason for visit: Intrahepatic cholangiocarcinoma     Oncology treatment summary  1.  Began having symptoms October 2019 with abdominal bloating and constipation with nausea.  Eventually she had a 30 pound weight loss with increasing GI symptoms  2.  Abdominal ultrasound 7/10/2020 with a large mass in the upper abdomen along with a large liver mass  3.  CT abdomen pelvis performed 7/13/2020 with a large hypoattenuating mass in the inferior right lobe of the liver (segment 6) measuring up to 6.5 cm.  Additionally there was a mass in the caudate lobe of the liver which appeared to surround the portal vein.  This measures 6 cm.  There was a large hypoattenuating necrotic lymph node in the aortocaval region measuring 3.5 cm along with other retroperitoneal adenopathy seen posterior to the left renal vein measuring up to 2.6 cm  4. Bx with EBV+ adenocarcinoma, intrahepatic choleangiocarcinoma. PDL! CPS 7, pMMR, NTRK-, MET-, FGFR2 pending  5. 8/18/2020 began pallaitive gemzar/cisplatin     HISTORY OF PRESENTING ILLNESS  Patient is here for a face-to-face visit today.  She has had 3 cycles of therapy and is due for cycle 4 next week. We have had to treat D1, 15 every 28 days due to thrombocytopenia.  She is feeling a little better. She needs a refill of pain meds. She has a number of appropriate questions about lifespan today     PAST MEDICAL HISTORY  Depression, PTSD, cholecystectomy, hepatitis b      CURRENT OUTPATIENT MEDICATIONS  Current Outpatient Medications   Medication     hydrochlorothiazide (HYDRODIURIL) 25 MG tablet     LORazepam (ATIVAN) 0.5 MG tablet     naloxone (NARCAN) 4  MG/0.1ML nasal spray     oxyCODONE (ROXICODONE) 10 MG tablet     docusate sodium (COLACE) 100 MG capsule     FLUoxetine 20 MG tablet     lidocaine-prilocaine (EMLA) 2.5-2.5 % external cream     mirtazapine (REMERON) 15 MG tablet     ondansetron (ZOFRAN) 8 MG tablet     ondansetron (ZOFRAN) 8 MG tablet     ondansetron (ZOFRAN-ODT) 4 MG ODT tab     polyethylene glycol (MIRALAX) powder     prochlorperazine (COMPAZINE) 10 MG tablet     prochlorperazine (COMPAZINE) 5 MG tablet     risperiDONE (RISPERDAL) 1 MG tablet     No current facility-administered medications for this visit.         ALLERGIES   No Known Allergies     SOCIAL HISTORY  No tobacco use, she is single and has a fiancé.  She lives in [place].  She has 7 children.  She works at home.  No tobacco or excessive alcohol use.     FAMILY HISTORY  No malignancies in her family history that she is aware of     REVIEW OF SYSTEMS  Review Of Systems  Skin: negative  Eyes: negative  Ears/Nose/Throat: negative  Respiratory: No shortness of breath, dyspnea on exertion, cough, or hemoptysis  Cardiovascular: negative  Gastrointestinal: negative per HPI  Genitourinary: negative  Musculoskeletal: negative  Neurologic: negative  Psychiatric: negative  Hematologic/Lymphatic/Immunologic: negative  Endocrine: negative      PHYSICAL EXAM  Physical Exam  Constitutional:       Appearance: Normal appearance.   HENT:      Head: Normocephalic and atraumatic.      Nose: Nose normal.   Eyes:      Extraocular Movements: Extraocular movements intact.      Conjunctiva/sclera: Conjunctivae normal.      Pupils: Pupils are equal, round, and reactive to light.   Neck:      Musculoskeletal: Normal range of motion and neck supple.   Cardiovascular:      Rate and Rhythm: Normal rate and regular rhythm.   Pulmonary:      Effort: Pulmonary effort is normal.      Breath sounds: Normal breath sounds.   Abdominal:      General: Abdomen is flat. Bowel sounds are normal.      Palpations: Abdomen is  soft.   Musculoskeletal: Normal range of motion.   Skin:     General: Skin is warm and dry.   Neurological:      General: No focal deficit present.      Mental Status: She is alert and oriented to person, place, and time. Mental status is at baseline.   Psychiatric:         Mood and Affect: Mood normal.         Behavior: Behavior normal.         Thought Content: Thought content normal.         Judgment: Judgment normal.       .        LABORATORY AND IMAGING STUDIES  Recent Labs   Lab Test 11/11/20  1335 11/05/20  1220 10/23/20  0749 10/08/20  1140 10/02/20  0900    140 142 139 140   POTASSIUM 3.3* 3.7 3.5 3.7 3.5   CHLORIDE 104 108 109 105 106   CO2 27 29 28 30 29   ANIONGAP 5 3 5 4 5   BUN 10 13 9 10 12   CR 0.74 0.66 0.58 0.66 0.72   * 119* 95 136* 104*   IMELDA 8.1* 7.8* 7.9* 8.9 8.4*     Recent Labs   Lab Test 11/11/20 1335 11/05/20  1220 10/23/20  0749 10/08/20  1140 10/02/20  0900   MAG 1.8 1.9 1.9 2.1 2.2     Recent Labs   Lab Test 11/11/20 1335 11/05/20  1220 10/23/20  0749 10/08/20  1140 10/02/20  0900   WBC 3.4* 3.8* 4.2 3.5* 4.8   HGB 12.0 11.5* 12.1 13.2 12.5   PLT 99* 50* 80* 102* 68*   MCV 96 96 94 94 93   NEUTROPHIL 62.5 63.4 63.1 57.2 66.8     Recent Labs   Lab Test 11/11/20 1335 11/05/20  1220 10/23/20  0749   BILITOTAL 1.4* 1.2 2.0*   ALKPHOS 124 225* 167*   ALT 38 68* 213*   AST 53* 72* 279*   ALBUMIN 2.2* 2.2* 2.3*     TSH   Date Value Ref Range Status   03/05/2020 3.20 0.40 - 4.00 mU/L Final     No results for input(s): CEA in the last 58601 hours.  Results for orders placed or performed in visit on 11/09/20   CT Chest/Abdomen/Pelvis w Contrast    Narrative    EXAMINATION: CT CHEST/ABDOMEN/PELVIS W CONTRAST, 11/9/2020 11:32 AM    TECHNIQUE: Helical CT images from the thoracic inlet through the  symphysis pubis were obtained with intravenous contrast. Contrast  dose: 131 ml isovue 370    COMPARISON: CT abdomen 7/13/2020, CT chest 7/24/2020    HISTORY: cholangiocarcinoma; Primary  malignant neoplasm of biliary  tract (H). EBV+ adenocarcinoma, intrahepatic cholangiocarcinomata.  8/18/2020 began palliative gemzar/cisplatin.    FINDINGS:    Lower neck: Visualized portions of the lower neck and thyroid gland  are unremarkable.    Chest:   Lungs and pleura: Stable pulmonary nodules measuring up to 3 mm on  series 11, image 62. No new or enlarging pulmonary nodules. No focal  consolidation or mass. No pleural effusion or pneumothorax.  Mediastinum: No lymphadenopathy by size criteria.  Heart and great vessels: Heart is normal in size and there is no  pericardial effusion. Aorta and main pulmonary artery are within  normal limits in caliber.    Abdomen and pelvis:  Hepatobiliary: Large mass in the nicholas hepatis and caudate lobe  encasing the main portal vein and common hepatic artery, has slightly  decreased in size, measuring 6.4 x 6.1 cm compared to 7.2 x 6.7 cm  previously. Right hepatic segment 6 mass measures 6.7 x 5.2 cm  compared to 7.3 x 5.7 cm previously. No additional hepatic masses.    Lymph nodes: Large necrotic aortocaval lymph node associated to the  third portion of the duodenum (7/325) measures 4 x 4.9 cm, unchanged.  Left para-aortic lymph node measures 2.6 cm compared to 2.4 cm  previously. Additional enhancing lymph nodes are noted anterior to the  common hepatic artery 1.7 cm (previously 1.3 cm), adjacent to the  hepatic flexure measuring 1.1 cm ( previously 0.9 cm) (7/289) and  posterior to the proximal transverse colon measuring 1.3 cm, unchanged  (7/302).    Peritoneum: Peritoneal nodularity is again noted most evident in the  left upper quadrant perisplenic region (series 7, image 332) and there  has been interval development of ascites, mesenteric edema.    Spleen: Normal size. No focal lesions.  Pancreas: The aortocaval lymph nodes are inseparable from the  pancreatic head. No evidence of pancreatic mass or peripancreatic  fluid.  Adrenals: Normal.  Kidneys: No  hydronephrosis, calculi or mass.  Urinary bladder: Unremarkable.  Reproductive organs: Uterus and ovaries are normal. There are dilated  pelvic veins and left ovarian vein due to compression of the left  renal vein.  Gastrointestinal: The stomach is unremarkable. The retroperitoneal  lymphadenopathy compresses the third portion of the duodenum but there  is no proximal dilatation. Small and large bowel are normal in caliber  and without abnormal wall thickening.  Vasculature: No aortic aneurysm. IVC is normal.     Bones and soft tissues: No aggressive lytic or sclerotic lesions.      Impression    IMPRESSION: In this patient with biopsy-proven intrahepatic  cholangiocarcinoma:  1.  Interval decrease in size of right hepatic mass and  lymphadenopathy in the nicholas hepatis and aortocaval regions.  2.  Nodular peritoneal thickening with development of moderate ascites  suggestive of peritoneal carcinomatosis.  3.  Mild increase in size of pericolonic lymph nodes adjacent to the   hepatic flexure.  4.  No evidence of metastasis in the chest.    JERO ANGULO MD                ASSESSMENT  AND RECOMMENDATIONS:  1.  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  History of hepatitis B   3.  Depression  4.  Chemotherapy induced thrombocytopenia    Plan  1. Her CT demonstrates a slight response. We will continue gem/cisplat  2. She would like the week of thanksgiving off and we will plan to treat her 12/4, 12/18 and 12/31  3. Refilled meds  4. We again discussed the pallaitive nature of therapy and that with chemotherapy survival is generally extended by months not years. She felt her questions were answered.    Laurie Fraser MD on 11/15/2020 at 6:26 PM            Again, thank you for allowing me to participate in the care of your patient.        Sincerely,        Laurie Fraser MD

## 2020-11-13 NOTE — NURSING NOTE
"Oncology Rooming Note    November 13, 2020 3:12 PM   Joesph Zeng is a 50 year old female who presents for:    Chief Complaint   Patient presents with     Oncology Clinic Visit     Initial Vitals: /73 (BP Location: Left arm, Patient Position: Chair, Cuff Size: Adult Large)   Pulse 78   Temp 98  F (36.7  C) (Oral)   Resp 18   Ht 1.626 m (5' 4\")   Wt 99.8 kg (220 lb)   LMP  (LMP Unknown)   SpO2 97%   BMI 37.76 kg/m   Estimated body mass index is 37.76 kg/m  as calculated from the following:    Height as of this encounter: 1.626 m (5' 4\").    Weight as of this encounter: 99.8 kg (220 lb). Body surface area is 2.12 meters squared.  Moderate Pain (4) Comment: Data Unavailable   No LMP recorded (lmp unknown). Patient is postmenopausal.  Allergies reviewed: Yes  Medications reviewed: Yes    Medications: MEDICATION REFILLS NEEDED TODAY. Provider was notified.  Pharmacy name entered into [facility]:    [id number] - [place], [place] - [address] [facility]  [id number] - [place], [place] - [address].    Clinical concerns:       Hien Vilchis RN            "

## 2020-11-16 NOTE — PROGRESS NOTES
[facility] PHYSICIANS  MEDICAL ONCOLOGY    RETURN PATIENT VISIT NOTE    Reason for visit: Intrahepatic cholangiocarcinoma     Oncology treatment summary  1.  Began having symptoms October 2019 with abdominal bloating and constipation with nausea.  Eventually she had a 30 pound weight loss with increasing GI symptoms  2.  Abdominal ultrasound 7/10/2020 with a large mass in the upper abdomen along with a large liver mass  3.  CT abdomen pelvis performed 7/13/2020 with a large hypoattenuating mass in the inferior right lobe of the liver (segment 6) measuring up to 6.5 cm.  Additionally there was a mass in the caudate lobe of the liver which appeared to surround the portal vein.  This measures 6 cm.  There was a large hypoattenuating necrotic lymph node in the aortocaval region measuring 3.5 cm along with other retroperitoneal adenopathy seen posterior to the left renal vein measuring up to 2.6 cm  4. Bx with EBV+ adenocarcinoma, intrahepatic choleangiocarcinoma. PDL! CPS 7, pMMR, NTRK-, MET-, FGFR2 pending  5. 8/18/2020 began pallaitive gemzar/cisplatin     HISTORY OF PRESENTING ILLNESS  Patient is here for a face-to-face visit today.  She has had 3 cycles of therapy and is due for cycle 4 next week. We have had to treat D1, 15 every 28 days due to thrombocytopenia.  She is feeling a little better. She needs a refill of pain meds. She has a number of appropriate questions about lifespan today     PAST MEDICAL HISTORY  Depression, PTSD, cholecystectomy, hepatitis b      CURRENT OUTPATIENT MEDICATIONS  Current Outpatient Medications   Medication     hydrochlorothiazide (HYDRODIURIL) 25 MG tablet     LORazepam (ATIVAN) 0.5 MG tablet     naloxone (NARCAN) 4 MG/0.1ML nasal spray     oxyCODONE (ROXICODONE) 10 MG tablet     docusate sodium (COLACE) 100 MG capsule     FLUoxetine 20 MG tablet     lidocaine-prilocaine (EMLA) 2.5-2.5 % external cream     mirtazapine (REMERON) 15 MG tablet     ondansetron (ZOFRAN) 8  MG tablet     ondansetron (ZOFRAN) 8 MG tablet     ondansetron (ZOFRAN-ODT) 4 MG ODT tab     polyethylene glycol (MIRALAX) powder     prochlorperazine (COMPAZINE) 10 MG tablet     prochlorperazine (COMPAZINE) 5 MG tablet     risperiDONE (RISPERDAL) 1 MG tablet     No current facility-administered medications for this visit.         ALLERGIES   No Known Allergies     SOCIAL HISTORY  No tobacco use, she is single and has a fiancé.  She lives in [place].  She has 7 children.  She works at home.  No tobacco or excessive alcohol use.     FAMILY HISTORY  No malignancies in her family history that she is aware of     REVIEW OF SYSTEMS  Review Of Systems  Skin: negative  Eyes: negative  Ears/Nose/Throat: negative  Respiratory: No shortness of breath, dyspnea on exertion, cough, or hemoptysis  Cardiovascular: negative  Gastrointestinal: negative per HPI  Genitourinary: negative  Musculoskeletal: negative  Neurologic: negative  Psychiatric: negative  Hematologic/Lymphatic/Immunologic: negative  Endocrine: negative      PHYSICAL EXAM  Physical Exam  Constitutional:       Appearance: Normal appearance.   HENT:      Head: Normocephalic and atraumatic.      Nose: Nose normal.   Eyes:      Extraocular Movements: Extraocular movements intact.      Conjunctiva/sclera: Conjunctivae normal.      Pupils: Pupils are equal, round, and reactive to light.   Neck:      Musculoskeletal: Normal range of motion and neck supple.   Cardiovascular:      Rate and Rhythm: Normal rate and regular rhythm.   Pulmonary:      Effort: Pulmonary effort is normal.      Breath sounds: Normal breath sounds.   Abdominal:      General: Abdomen is flat. Bowel sounds are normal.      Palpations: Abdomen is soft.   Musculoskeletal: Normal range of motion.   Skin:     General: Skin is warm and dry.   Neurological:      General: No focal deficit present.      Mental Status: She is alert and oriented to person, place, and time. Mental status is at baseline.    Psychiatric:         Mood and Affect: Mood normal.         Behavior: Behavior normal.         Thought Content: Thought content normal.         Judgment: Judgment normal.       .        LABORATORY AND IMAGING STUDIES  Recent Labs   Lab Test 11/11/20 1335 11/05/20  1220 10/23/20  0749 10/08/20  1140 10/02/20  0900    140 142 139 140   POTASSIUM 3.3* 3.7 3.5 3.7 3.5   CHLORIDE 104 108 109 105 106   CO2 27 29 28 30 29   ANIONGAP 5 3 5 4 5   BUN 10 13 9 10 12   CR 0.74 0.66 0.58 0.66 0.72   * 119* 95 136* 104*   IMELDA 8.1* 7.8* 7.9* 8.9 8.4*     Recent Labs   Lab Test 11/11/20 1335 11/05/20  1220 10/23/20  0749 10/08/20  1140 10/02/20  0900   MAG 1.8 1.9 1.9 2.1 2.2     Recent Labs   Lab Test 11/11/20 1335 11/05/20  1220 10/23/20  0749 10/08/20  1140 10/02/20  0900   WBC 3.4* 3.8* 4.2 3.5* 4.8   HGB 12.0 11.5* 12.1 13.2 12.5   PLT 99* 50* 80* 102* 68*   MCV 96 96 94 94 93   NEUTROPHIL 62.5 63.4 63.1 57.2 66.8     Recent Labs   Lab Test 11/11/20 1335 11/05/20  1220 10/23/20  0749   BILITOTAL 1.4* 1.2 2.0*   ALKPHOS 124 225* 167*   ALT 38 68* 213*   AST 53* 72* 279*   ALBUMIN 2.2* 2.2* 2.3*     TSH   Date Value Ref Range Status   03/05/2020 3.20 0.40 - 4.00 mU/L Final     No results for input(s): CEA in the last 40475 hours.  Results for orders placed or performed in visit on 11/09/20   CT Chest/Abdomen/Pelvis w Contrast    Narrative    EXAMINATION: CT CHEST/ABDOMEN/PELVIS W CONTRAST, 11/9/2020 11:32 AM    TECHNIQUE: Helical CT images from the thoracic inlet through the  symphysis pubis were obtained with intravenous contrast. Contrast  dose: 131 ml isovue 370    COMPARISON: CT abdomen 7/13/2020, CT chest 7/24/2020    HISTORY: cholangiocarcinoma; Primary malignant neoplasm of biliary  tract (H). EBV+ adenocarcinoma, intrahepatic cholangiocarcinomata.  8/18/2020 began palliative gemzar/cisplatin.    FINDINGS:    Lower neck: Visualized portions of the lower neck and thyroid gland  are unremarkable.    Chest:    Lungs and pleura: Stable pulmonary nodules measuring up to 3 mm on  series 11, image 62. No new or enlarging pulmonary nodules. No focal  consolidation or mass. No pleural effusion or pneumothorax.  Mediastinum: No lymphadenopathy by size criteria.  Heart and great vessels: Heart is normal in size and there is no  pericardial effusion. Aorta and main pulmonary artery are within  normal limits in caliber.    Abdomen and pelvis:  Hepatobiliary: Large mass in the nicholas hepatis and caudate lobe  encasing the main portal vein and common hepatic artery, has slightly  decreased in size, measuring 6.4 x 6.1 cm compared to 7.2 x 6.7 cm  previously. Right hepatic segment 6 mass measures 6.7 x 5.2 cm  compared to 7.3 x 5.7 cm previously. No additional hepatic masses.    Lymph nodes: Large necrotic aortocaval lymph node associated to the  third portion of the duodenum (7/325) measures 4 x 4.9 cm, unchanged.  Left para-aortic lymph node measures 2.6 cm compared to 2.4 cm  previously. Additional enhancing lymph nodes are noted anterior to the  common hepatic artery 1.7 cm (previously 1.3 cm), adjacent to the  hepatic flexure measuring 1.1 cm ( previously 0.9 cm) (7/289) and  posterior to the proximal transverse colon measuring 1.3 cm, unchanged  (7/302).    Peritoneum: Peritoneal nodularity is again noted most evident in the  left upper quadrant perisplenic region (series 7, image 332) and there  has been interval development of ascites, mesenteric edema.    Spleen: Normal size. No focal lesions.  Pancreas: The aortocaval lymph nodes are inseparable from the  pancreatic head. No evidence of pancreatic mass or peripancreatic  fluid.  Adrenals: Normal.  Kidneys: No hydronephrosis, calculi or mass.  Urinary bladder: Unremarkable.  Reproductive organs: Uterus and ovaries are normal. There are dilated  pelvic veins and left ovarian vein due to compression of the left  renal vein.  Gastrointestinal: The stomach is unremarkable. The  retroperitoneal  lymphadenopathy compresses the third portion of the duodenum but there  is no proximal dilatation. Small and large bowel are normal in caliber  and without abnormal wall thickening.  Vasculature: No aortic aneurysm. IVC is normal.     Bones and soft tissues: No aggressive lytic or sclerotic lesions.      Impression    IMPRESSION: In this patient with biopsy-proven intrahepatic  cholangiocarcinoma:  1.  Interval decrease in size of right hepatic mass and  lymphadenopathy in the nicholas hepatis and aortocaval regions.  2.  Nodular peritoneal thickening with development of moderate ascites  suggestive of peritoneal carcinomatosis.  3.  Mild increase in size of pericolonic lymph nodes adjacent to the   hepatic flexure.  4.  No evidence of metastasis in the chest.    JERO ANGULO MD                ASSESSMENT  AND RECOMMENDATIONS:  1.  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  History of hepatitis B   3.  Depression  4.  Chemotherapy induced thrombocytopenia    Plan  1. Her CT demonstrates a slight response. We will continue gem/cisplat  2. She would like the week of thanksgiving off and we will plan to treat her 12/4, 12/18 and 12/31  3. Refilled meds  4. We again discussed the pallaitive nature of therapy and that with chemotherapy survival is generally extended by months not years. She felt her questions were answered.    Laurie Fraser MD on 11/15/2020 at 6:26 PM

## 2020-12-04 NOTE — PROGRESS NOTES
Port needle left accessed for treatment. Tolerated port access and draw without complaint. Port site scrubbed with Chloraprep for 30 seconds and allowed to dry completely prior to dressing application. Accessed using sterile technique. [place] tubes drawn-Red gel/Green/Purple tubes. Double signed by patient and RN. See documentation flowsheet. Anamika Leiva, RN, BSN, OCN

## 2020-12-04 NOTE — LETTER
12/4/2020         RE: Joesph Zeng  [address]  [facility] [id number]        Dear Colleague,    Thank you for referring your patient, Joesph Zeng, to the [facility]. Please see a copy of my visit note below.    [facility] PHYSICIANS  MEDICAL ONCOLOGY    RETURN PATIENT VISIT NOTE    Reason for visit: Intrahepatic cholangiocarcinoma     Oncology treatment summary  1.  Began having symptoms October 2019 with abdominal bloating and constipation with nausea.  Eventually she had a 30 pound weight loss with increasing GI symptoms  2.  Abdominal ultrasound 7/10/2020 with a large mass in the upper abdomen along with a large liver mass  3.  CT abdomen pelvis performed 7/13/2020 with a large hypoattenuating mass in the inferior right lobe of the liver (segment 6) measuring up to 6.5 cm.  Additionally there was a mass in the caudate lobe of the liver which appeared to surround the portal vein.  This measures 6 cm.  There was a large hypoattenuating necrotic lymph node in the aortocaval region measuring 3.5 cm along with other retroperitoneal adenopathy seen posterior to the left renal vein measuring up to 2.6 cm  4. Bx with EBV+ adenocarcinoma, intrahepatic choleangiocarcinoma. PDL! CPS 7, pMMR, NTRK-, MET-, FGFR2 pending  5. 8/18/2020 began pallaitive gemzar/cisplatin     HISTORY OF PRESENTING ILLNESS  Patient is here for a face-to-face visit today.  She is here for cycle 4 of therapy.  Her CT scan done after 3 cycles did show some improvement.  She is receiving her therapy day 1 and 15 of an every 28-day cycle due to thrombocytopenia.  She is about the same.  She is fatigued today and somewhat sleepy while talking on her.  She states her pain has not been well controlled and she is afraid to take for her oxycodone twice a day.  She will run out prior to the month being up.  She also needs a refill of her lorazepam today.  She had spoken with her psychiatrist about  increasing her mirtazapine to see if it would help with sleep and appetite and it she was told this would be fine however she reveals to me that she is concerned about increasing it as she is already really sleepy from it and does not want to be more sleepy.  She feels she is eating okay although she has lost a couple of pounds again as she also feels that she is becoming increasingly weak and fell once and bruised her knee.     PAST MEDICAL HISTORY  Depression, PTSD, cholecystectomy, hepatitis b      CURRENT OUTPATIENT MEDICATIONS  Current Outpatient Medications   Medication     FLUoxetine 20 MG tablet     hydrochlorothiazide (HYDRODIURIL) 25 MG tablet     LORazepam (ATIVAN) 0.5 MG tablet     mirtazapine (REMERON) 15 MG tablet     ondansetron (ZOFRAN) 8 MG tablet     oxyCODONE IR (ROXICODONE) 10 MG tablet     prochlorperazine (COMPAZINE) 5 MG tablet     risperiDONE (RISPERDAL) 1 MG tablet     docusate sodium (COLACE) 100 MG capsule     lidocaine-prilocaine (EMLA) 2.5-2.5 % external cream     naloxone (NARCAN) 4 MG/0.1ML nasal spray     ondansetron (ZOFRAN) 8 MG tablet     ondansetron (ZOFRAN-ODT) 4 MG ODT tab     polyethylene glycol (MIRALAX) powder     prochlorperazine (COMPAZINE) 10 MG tablet     No current facility-administered medications for this visit.      Facility-Administered Medications Ordered in Other Visits   Medication     CISplatin (PLATINOL) 45 mg in sodium chloride 0.9 % 320 mL infusion     gemcitabine (GEMZAR) 1,800 mg in sodium chloride 0.9 % 322 mL infusion     heparin 100 UNIT/ML injection 5 mL     heparin 100 UNIT/ML injection 5 mL     potassium chloride ER (KLOR-CON M) CR tablet 40 mEq     sodium chloride (PF) 0.9% PF flush 10-20 mL     sodium chloride (PF) 0.9% PF flush 3-20 mL        ALLERGIES   No Known Allergies     SOCIAL HISTORY  No tobacco use, she is single and has a fiancé.  She lives in [place].  She has 7 children.  She works at home.  No tobacco or excessive alcohol use.      FAMILY HISTORY  No malignancies in her family history that she is aware of     REVIEW OF SYSTEMS  Review Of Systems  Skin: negative  Eyes: negative  Ears/Nose/Throat: negative  Respiratory: No shortness of breath, dyspnea on exertion, cough, or hemoptysis  Cardiovascular: negative  Gastrointestinal: negative per HPI  Genitourinary: negative  Musculoskeletal: negative  Neurologic: negative  Psychiatric: negative  Hematologic/Lymphatic/Immunologic: negative  Endocrine: negative      PHYSICAL EXAM  Physical Exam  Constitutional:       Appearance: Normal appearance.   HENT:      Head: Normocephalic and atraumatic.      Nose: Nose normal.   Eyes:      Extraocular Movements: Extraocular movements intact.      Conjunctiva/sclera: Conjunctivae normal.      Pupils: Pupils are equal, round, and reactive to light.   Neck:      Musculoskeletal: Normal range of motion and neck supple.   Cardiovascular:      Rate and Rhythm: Normal rate and regular rhythm.   Pulmonary:      Effort: Pulmonary effort is normal.      Breath sounds: Normal breath sounds.   Abdominal:      General: Abdomen is flat. Bowel sounds are normal.      Palpations: Abdomen is soft.   Musculoskeletal: Normal range of motion.   Skin:     General: Skin is warm and dry.   Neurological:      General: No focal deficit present.      Mental Status: She is alert and oriented to person, place, and time. Mental status is at baseline.   Psychiatric:         Mood and Affect: Mood normal.         Behavior: Behavior normal.         Thought Content: Thought content normal.         Judgment: Judgment normal.       .        LABORATORY AND IMAGING STUDIES  Recent Labs   Lab Test 11/11/20  1335 11/05/20  1220 10/23/20  0749 10/08/20  1140 10/02/20  0900    140 142 139 140   POTASSIUM 3.3* 3.7 3.5 3.7 3.5   CHLORIDE 104 108 109 105 106   CO2 27 29 28 30 29   ANIONGAP 5 3 5 4 5   BUN 10 13 9 10 12   CR 0.74 0.66 0.58 0.66 0.72   * 119* 95 136* 104*   IMELDA 8.1* 7.8* 7.9*  8.9 8.4*     Recent Labs   Lab Test 11/11/20  1335 11/05/20  1220 10/23/20  0749 10/08/20  1140 10/02/20  0900   MAG 1.8 1.9 1.9 2.1 2.2     Recent Labs   Lab Test 11/11/20  1335 11/05/20  1220 10/23/20  0749 10/08/20  1140 10/02/20  0900   WBC 3.4* 3.8* 4.2 3.5* 4.8   HGB 12.0 11.5* 12.1 13.2 12.5   PLT 99* 50* 80* 102* 68*   MCV 96 96 94 94 93   NEUTROPHIL 62.5 63.4 63.1 57.2 66.8     Recent Labs   Lab Test 11/11/20  1335 11/05/20  1220 10/23/20  0749   BILITOTAL 1.4* 1.2 2.0*   ALKPHOS 124 225* 167*   ALT 38 68* 213*   AST 53* 72* 279*   ALBUMIN 2.2* 2.2* 2.3*     TSH   Date Value Ref Range Status   03/05/2020 3.20 0.40 - 4.00 mU/L Final     No results for input(s): CEA in the last 80851 hours.  Results for orders placed or performed in visit on 11/09/20   CT Chest/Abdomen/Pelvis w Contrast    Narrative    EXAMINATION: CT CHEST/ABDOMEN/PELVIS W CONTRAST, 11/9/2020 11:32 AM    TECHNIQUE: Helical CT images from the thoracic inlet through the  symphysis pubis were obtained with intravenous contrast. Contrast  dose: 131 ml isovue 370    COMPARISON: CT abdomen 7/13/2020, CT chest 7/24/2020    HISTORY: cholangiocarcinoma; Primary malignant neoplasm of biliary  tract (H). EBV+ adenocarcinoma, intrahepatic cholangiocarcinomata.  8/18/2020 began palliative gemzar/cisplatin.    FINDINGS:    Lower neck: Visualized portions of the lower neck and thyroid gland  are unremarkable.    Chest:   Lungs and pleura: Stable pulmonary nodules measuring up to 3 mm on  series 11, image 62. No new or enlarging pulmonary nodules. No focal  consolidation or mass. No pleural effusion or pneumothorax.  Mediastinum: No lymphadenopathy by size criteria.  Heart and great vessels: Heart is normal in size and there is no  pericardial effusion. Aorta and main pulmonary artery are within  normal limits in caliber.    Abdomen and pelvis:  Hepatobiliary: Large mass in the nicholas hepatis and caudate lobe  encasing the main portal vein and common hepatic  artery, has slightly  decreased in size, measuring 6.4 x 6.1 cm compared to 7.2 x 6.7 cm  previously. Right hepatic segment 6 mass measures 6.7 x 5.2 cm  compared to 7.3 x 5.7 cm previously. No additional hepatic masses.    Lymph nodes: Large necrotic aortocaval lymph node associated to the  third portion of the duodenum (7/325) measures 4 x 4.9 cm, unchanged.  Left para-aortic lymph node measures 2.6 cm compared to 2.4 cm  previously. Additional enhancing lymph nodes are noted anterior to the  common hepatic artery 1.7 cm (previously 1.3 cm), adjacent to the  hepatic flexure measuring 1.1 cm ( previously 0.9 cm) (7/289) and  posterior to the proximal transverse colon measuring 1.3 cm, unchanged  (7/302).    Peritoneum: Peritoneal nodularity is again noted most evident in the  left upper quadrant perisplenic region (series 7, image 332) and there  has been interval development of ascites, mesenteric edema.    Spleen: Normal size. No focal lesions.  Pancreas: The aortocaval lymph nodes are inseparable from the  pancreatic head. No evidence of pancreatic mass or peripancreatic  fluid.  Adrenals: Normal.  Kidneys: No hydronephrosis, calculi or mass.  Urinary bladder: Unremarkable.  Reproductive organs: Uterus and ovaries are normal. There are dilated  pelvic veins and left ovarian vein due to compression of the left  renal vein.  Gastrointestinal: The stomach is unremarkable. The retroperitoneal  lymphadenopathy compresses the third portion of the duodenum but there  is no proximal dilatation. Small and large bowel are normal in caliber  and without abnormal wall thickening.  Vasculature: No aortic aneurysm. IVC is normal.     Bones and soft tissues: No aggressive lytic or sclerotic lesions.      Impression    IMPRESSION: In this patient with biopsy-proven intrahepatic  cholangiocarcinoma:  1.  Interval decrease in size of right hepatic mass and  lymphadenopathy in the nicholas hepatis and aortocaval regions.  2.  Nodular  peritoneal thickening with development of moderate ascites  suggestive of peritoneal carcinomatosis.  3.  Mild increase in size of pericolonic lymph nodes adjacent to the   hepatic flexure.  4.  No evidence of metastasis in the chest.    JERO ANGULO MD                ASSESSMENT  AND RECOMMENDATIONS:  1.  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  History of hepatitis B   3.  Depression  4.  Chemotherapy induced thrombocytopenia    Plan  1.  She will continue her Gemzar and cisplatin day 1 and 15 every 28-day cycle.  2.  I will refill her oxycodone for 60 tablets for a month.  I will also refill her lorazepam.  3.  We discussed increasing her mirtazapine however we will hold at this time given her level of sleepiness.  4.  She will need a follow-up CT scan toward the end of January.  5.  We discussed a palliative care appointment however she does not want to come in for extra appointments until can be done via video visit or telephone visit which she is hoping for in January.    Laurie Fraser MD on 12/4/2020 at 12:32 PM              Again, thank you for allowing me to participate in the care of your patient.        Sincerely,        Laurie Fraser MD

## 2020-12-04 NOTE — NURSING NOTE
"Oncology Rooming Note    December 4, 2020 9:30 AM   Joesph Zeng is a 50 year old female who presents for:    Chief Complaint   Patient presents with     Oncology Clinic Visit     Return visit     Initial Vitals: /81   Pulse 93   Temp 98.4  F (36.9  C) (Oral)   Wt 97.5 kg (215 lb)   LMP  (LMP Unknown)   SpO2 97%   BMI 36.90 kg/m   Estimated body mass index is 36.9 kg/m  as calculated from the following:    Height as of 11/13/20: 1.626 m (5' 4\").    Weight as of this encounter: 97.5 kg (215 lb). Body surface area is 2.1 meters squared.  Extreme Pain (8) Comment: Data Unavailable   No LMP recorded (lmp unknown). Patient is postmenopausal.  Allergies reviewed: Yes  Medications reviewed: Yes    Medications: MEDICATION REFILLS NEEDED TODAY. Provider was notified.  Pharmacy name entered into [facility]:    [id number] - [facility], [place] - [address] AT [address] & [facility]  [id number] - [facility], [place] - [address].    Clinical concerns: fell 2 times since last visit, feeling unbalanced.  Bruised L knee Dr Fraser was notified.      Shoaib Ruffin RN              "

## 2020-12-04 NOTE — PROGRESS NOTES
Infusion Nursing Note:  Joesph Zeng presents today for C4D1 Cisplatin/Gemzar and Potassium Replacement.    Patient seen by provider today: Yes: Dr Fraser   present during visit today: Not Applicable.    Note:   Answered questions with nodding of head or 1-2 worded answers spoken very quietly.    Patient states she is able to take pills without difficulty.  Therefore opted to get 80 meq of Potassium orally and an additional 10 meq in the IV bag.    Intravenous Access:  Implanted Port.    Treatment Conditions:  Lab Results   Component Value Date    HGB 12.0 12/04/2020     Lab Results   Component Value Date    WBC 5.4 12/04/2020      Lab Results   Component Value Date    ANEU 3.3 12/04/2020     Lab Results   Component Value Date    PLT 91 12/04/2020      Lab Results   Component Value Date     12/04/2020                   Lab Results   Component Value Date    POTASSIUM 2.3 12/04/2020           Lab Results   Component Value Date    MAG 1.9 12/04/2020            Lab Results   Component Value Date    CR 0.68 12/04/2020                   Lab Results   Component Value Date    IMELDA 8.3 12/04/2020                Lab Results   Component Value Date    BILITOTAL 1.9 12/04/2020           Lab Results   Component Value Date    ALBUMIN 2.1 12/04/2020                    Lab Results   Component Value Date    ALT 33 12/04/2020           Lab Results   Component Value Date    AST 61 12/04/2020       Results reviewed, labs MET treatment parameters, ok to proceed with treatment.      Post Infusion Assessment:  Patient tolerated infusion without incident.  Blood return noted pre and post infusion.  Site patent and intact, free from redness, edema or discomfort.  No evidence of extravasations.  Access discontinued per protocol.       Discharge Plan:   AVS to patient via [facility].  Patient will return 12/18/2020 for next appointment.   Patient discharged in stable condition accompanied by: self. Boyfriend is   Departure  Mode: Ambulatory.    Aimee Ryan RN

## 2020-12-04 NOTE — PROGRESS NOTES
[facility]  MEDICAL ONCOLOGY    RETURN PATIENT VISIT NOTE    Reason for visit: Intrahepatic cholangiocarcinoma     Oncology treatment summary  1.  Began having symptoms October 2019 with abdominal bloating and constipation with nausea.  Eventually she had a 30 pound weight loss with increasing GI symptoms  2.  Abdominal ultrasound 7/10/2020 with a large mass in the upper abdomen along with a large liver mass  3.  CT abdomen pelvis performed 7/13/2020 with a large hypoattenuating mass in the inferior right lobe of the liver (segment 6) measuring up to 6.5 cm.  Additionally there was a mass in the caudate lobe of the liver which appeared to surround the portal vein.  This measures 6 cm.  There was a large hypoattenuating necrotic lymph node in the aortocaval region measuring 3.5 cm along with other retroperitoneal adenopathy seen posterior to the left renal vein measuring up to 2.6 cm  4. Bx with EBV+ adenocarcinoma, intrahepatic choleangiocarcinoma. PDL! CPS 7, pMMR, NTRK-, MET-, FGFR2 pending  5. 8/18/2020 began pallaitive gemzar/cisplatin     HISTORY OF PRESENTING ILLNESS  Patient is here for a face-to-face visit today.  She is here for cycle 4 of therapy.  Her CT scan done after 3 cycles did show some improvement.  She is receiving her therapy day 1 and 15 of an every 28-day cycle due to thrombocytopenia.  She is about the same.  She is fatigued today and somewhat sleepy while talking on her.  She states her pain has not been well controlled and she is afraid to take for her oxycodone twice a day.  She will run out prior to the month being up.  She also needs a refill of her lorazepam today.  She had spoken with her psychiatrist about increasing her mirtazapine to see if it would help with sleep and appetite and it she was told this would be fine however she reveals to me that she is concerned about increasing it as she is already really sleepy from it and does not want to be more sleepy.   She feels she is eating okay although she has lost a couple of pounds again as she also feels that she is becoming increasingly weak and fell once and bruised her knee.     PAST MEDICAL HISTORY  Depression, PTSD, cholecystectomy, hepatitis b      CURRENT OUTPATIENT MEDICATIONS  Current Outpatient Medications   Medication     FLUoxetine 20 MG tablet     hydrochlorothiazide (HYDRODIURIL) 25 MG tablet     LORazepam (ATIVAN) 0.5 MG tablet     mirtazapine (REMERON) 15 MG tablet     ondansetron (ZOFRAN) 8 MG tablet     oxyCODONE IR (ROXICODONE) 10 MG tablet     prochlorperazine (COMPAZINE) 5 MG tablet     risperiDONE (RISPERDAL) 1 MG tablet     docusate sodium (COLACE) 100 MG capsule     lidocaine-prilocaine (EMLA) 2.5-2.5 % external cream     naloxone (NARCAN) 4 MG/0.1ML nasal spray     ondansetron (ZOFRAN) 8 MG tablet     ondansetron (ZOFRAN-ODT) 4 MG ODT tab     polyethylene glycol (MIRALAX) powder     prochlorperazine (COMPAZINE) 10 MG tablet     No current facility-administered medications for this visit.      Facility-Administered Medications Ordered in Other Visits   Medication     CISplatin (PLATINOL) 45 mg in sodium chloride 0.9 % 320 mL infusion     gemcitabine (GEMZAR) 1,800 mg in sodium chloride 0.9 % 322 mL infusion     heparin 100 UNIT/ML injection 5 mL     heparin 100 UNIT/ML injection 5 mL     potassium chloride ER (KLOR-CON M) CR tablet 40 mEq     sodium chloride (PF) 0.9% PF flush 10-20 mL     sodium chloride (PF) 0.9% PF flush 3-20 mL        ALLERGIES   No Known Allergies     SOCIAL HISTORY  No tobacco use, she is single and has a fiancé.  She lives in [place].  She has 7 children.  She works at home.  No tobacco or excessive alcohol use.     FAMILY HISTORY  No malignancies in her family history that she is aware of     REVIEW OF SYSTEMS  Review Of Systems  Skin: negative  Eyes: negative  Ears/Nose/Throat: negative  Respiratory: No shortness of breath, dyspnea on exertion, cough, or  hemoptysis  Cardiovascular: negative  Gastrointestinal: negative per HPI  Genitourinary: negative  Musculoskeletal: negative  Neurologic: negative  Psychiatric: negative  Hematologic/Lymphatic/Immunologic: negative  Endocrine: negative      PHYSICAL EXAM  Physical Exam  Constitutional:       Appearance: Normal appearance.   HENT:      Head: Normocephalic and atraumatic.      Nose: Nose normal.   Eyes:      Extraocular Movements: Extraocular movements intact.      Conjunctiva/sclera: Conjunctivae normal.      Pupils: Pupils are equal, round, and reactive to light.   Neck:      Musculoskeletal: Normal range of motion and neck supple.   Cardiovascular:      Rate and Rhythm: Normal rate and regular rhythm.   Pulmonary:      Effort: Pulmonary effort is normal.      Breath sounds: Normal breath sounds.   Abdominal:      General: Abdomen is flat. Bowel sounds are normal.      Palpations: Abdomen is soft.   Musculoskeletal: Normal range of motion.   Skin:     General: Skin is warm and dry.   Neurological:      General: No focal deficit present.      Mental Status: She is alert and oriented to person, place, and time. Mental status is at baseline.   Psychiatric:         Mood and Affect: Mood normal.         Behavior: Behavior normal.         Thought Content: Thought content normal.         Judgment: Judgment normal.       .        LABORATORY AND IMAGING STUDIES  Recent Labs   Lab Test 11/11/20  1335 11/05/20  1220 10/23/20  0749 10/08/20  1140 10/02/20  0900    140 142 139 140   POTASSIUM 3.3* 3.7 3.5 3.7 3.5   CHLORIDE 104 108 109 105 106   CO2 27 29 28 30 29   ANIONGAP 5 3 5 4 5   BUN 10 13 9 10 12   CR 0.74 0.66 0.58 0.66 0.72   * 119* 95 136* 104*   IMELDA 8.1* 7.8* 7.9* 8.9 8.4*     Recent Labs   Lab Test 11/11/20  1335 11/05/20  1220 10/23/20  0749 10/08/20  1140 10/02/20  0900   MAG 1.8 1.9 1.9 2.1 2.2     Recent Labs   Lab Test 11/11/20  1335 11/05/20  1220 10/23/20  0749 10/08/20  1140 10/02/20  0900   WBC  3.4* 3.8* 4.2 3.5* 4.8   HGB 12.0 11.5* 12.1 13.2 12.5   PLT 99* 50* 80* 102* 68*   MCV 96 96 94 94 93   NEUTROPHIL 62.5 63.4 63.1 57.2 66.8     Recent Labs   Lab Test 11/11/20  1335 11/05/20  1220 10/23/20  0749   BILITOTAL 1.4* 1.2 2.0*   ALKPHOS 124 225* 167*   ALT 38 68* 213*   AST 53* 72* 279*   ALBUMIN 2.2* 2.2* 2.3*     TSH   Date Value Ref Range Status   03/05/2020 3.20 0.40 - 4.00 mU/L Final     No results for input(s): CEA in the last 65194 hours.  Results for orders placed or performed in visit on 11/09/20   CT Chest/Abdomen/Pelvis w Contrast    Narrative    EXAMINATION: CT CHEST/ABDOMEN/PELVIS W CONTRAST, 11/9/2020 11:32 AM    TECHNIQUE: Helical CT images from the thoracic inlet through the  symphysis pubis were obtained with intravenous contrast. Contrast  dose: 131 ml isovue 370    COMPARISON: CT abdomen 7/13/2020, CT chest 7/24/2020    HISTORY: cholangiocarcinoma; Primary malignant neoplasm of biliary  tract (H). EBV+ adenocarcinoma, intrahepatic cholangiocarcinomata.  8/18/2020 began palliative gemzar/cisplatin.    FINDINGS:    Lower neck: Visualized portions of the lower neck and thyroid gland  are unremarkable.    Chest:   Lungs and pleura: Stable pulmonary nodules measuring up to 3 mm on  series 11, image 62. No new or enlarging pulmonary nodules. No focal  consolidation or mass. No pleural effusion or pneumothorax.  Mediastinum: No lymphadenopathy by size criteria.  Heart and great vessels: Heart is normal in size and there is no  pericardial effusion. Aorta and main pulmonary artery are within  normal limits in caliber.    Abdomen and pelvis:  Hepatobiliary: Large mass in the nicholas hepatis and caudate lobe  encasing the main portal vein and common hepatic artery, has slightly  decreased in size, measuring 6.4 x 6.1 cm compared to 7.2 x 6.7 cm  previously. Right hepatic segment 6 mass measures 6.7 x 5.2 cm  compared to 7.3 x 5.7 cm previously. No additional hepatic masses.    Lymph nodes: Large  necrotic aortocaval lymph node associated to the  third portion of the duodenum (7/325) measures 4 x 4.9 cm, unchanged.  Left para-aortic lymph node measures 2.6 cm compared to 2.4 cm  previously. Additional enhancing lymph nodes are noted anterior to the  common hepatic artery 1.7 cm (previously 1.3 cm), adjacent to the  hepatic flexure measuring 1.1 cm ( previously 0.9 cm) (7/289) and  posterior to the proximal transverse colon measuring 1.3 cm, unchanged  (7/302).    Peritoneum: Peritoneal nodularity is again noted most evident in the  left upper quadrant perisplenic region (series 7, image 332) and there  has been interval development of ascites, mesenteric edema.    Spleen: Normal size. No focal lesions.  Pancreas: The aortocaval lymph nodes are inseparable from the  pancreatic head. No evidence of pancreatic mass or peripancreatic  fluid.  Adrenals: Normal.  Kidneys: No hydronephrosis, calculi or mass.  Urinary bladder: Unremarkable.  Reproductive organs: Uterus and ovaries are normal. There are dilated  pelvic veins and left ovarian vein due to compression of the left  renal vein.  Gastrointestinal: The stomach is unremarkable. The retroperitoneal  lymphadenopathy compresses the third portion of the duodenum but there  is no proximal dilatation. Small and large bowel are normal in caliber  and without abnormal wall thickening.  Vasculature: No aortic aneurysm. IVC is normal.     Bones and soft tissues: No aggressive lytic or sclerotic lesions.      Impression    IMPRESSION: In this patient with biopsy-proven intrahepatic  cholangiocarcinoma:  1.  Interval decrease in size of right hepatic mass and  lymphadenopathy in the nicholas hepatis and aortocaval regions.  2.  Nodular peritoneal thickening with development of moderate ascites  suggestive of peritoneal carcinomatosis.  3.  Mild increase in size of pericolonic lymph nodes adjacent to the   hepatic flexure.  4.  No evidence of metastasis in the  chest.    JERO ANGULO MD                ASSESSMENT  AND RECOMMENDATIONS:  1.  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  History of hepatitis B   3.  Depression  4.  Chemotherapy induced thrombocytopenia    Plan  1.  She will continue her Gemzar and cisplatin day 1 and 15 every 28-day cycle.  2.  I will refill her oxycodone for 60 tablets for a month.  I will also refill her lorazepam.  3.  We discussed increasing her mirtazapine however we will hold at this time given her level of sleepiness.  4.  She will need a follow-up CT scan toward the end of January.  5.  We discussed a palliative care appointment however she does not want to come in for extra appointments until can be done via video visit or telephone visit which she is hoping for in January.    Laurie Fraser MD on 12/4/2020 at 12:32 PM

## 2020-12-07 NOTE — PROGRESS NOTES
Social Work Note: Telephone Call  Oncology Clinic    Data/Intervention:  Patient Name:  Joesph Zeng  /Age:  1970 (50 year old)    Call From:  ALFREDITO  Reason for Call:  Psychosocial outreach    Assessment:  SW called and spoke to Joesph this afternoon, just checking in on how she has been doing. Joesph sounded quite tired, but she was able to engage and was pleasant with the SW. Joesph reports that she is in a lot of pain today. She is struggling with pain and fatigue. ALFREDITO did speak with Joesph about meeting with palliative care and Joesph plans to do so in January. ALFREDITO notes that will likely be a helpful step for her. Joesph denies any other needs at this time. SW offered support and advised Joesph she would continue to check in periodically. Joesph was agreeable to this. SW encouraged also encouraged Joesph to call with any questions or needs.      Plan:  ALFREDITO will continue to follow and provide support and resources as needed.     ROC Solorzano, [facility]  Clinical , Adult Oncology  Phone: [phone]

## 2020-12-08 PROBLEM — R17 ELEVATED BILIRUBIN: Status: ACTIVE | Noted: 2020-01-01

## 2020-12-08 PROBLEM — E87.20 LACTIC ACIDOSIS: Status: ACTIVE | Noted: 2020-01-01

## 2020-12-08 PROBLEM — I81 PORTAL VEIN THROMBOSIS: Status: ACTIVE | Noted: 2020-01-01

## 2020-12-08 PROBLEM — C22.1 CHOLANGIOCARCINOMA (H): Status: ACTIVE | Noted: 2020-01-01

## 2020-12-08 NOTE — LETTER
Health Information Management Services               Recipient:  [facility]        Sender:  Michelle Roque, [facility] HospitalSW [phone]        Date: December 11, 2020  Patient Name:  Joesph Zeng  Routing Message:    Discharge/Hospice Orders        The documents accompanying this notice contain confidential information belonging to the sender.  This information is intended only for the use of the individual or entity named above.  The authorized recipient of this information is prohibited from disclosing this information to any other party and is required to destroy the information after its stated need has been fulfilled, unless otherwise required by state law.      If you are not the intended recipient, you are hereby notified that any disclosure, copy, distribution or action taken in reliance on the contents of these documents is strictly prohibited.  If you have received this document in error, please return it by fax to [phone] with a note on the cover sheet explaining why you are returning it (e.g. not your patient, not your provider, etc.).  If you need further assistance, please call [facility] Centralized Transcription at [phone].  Documents may also be returned by mail to [employer], , [facility] [address], [place], [place] [address].

## 2020-12-08 NOTE — ED TRIAGE NOTES
"Triage Assessment & Note:    /81   Pulse 115   Temp 97.9  F (36.6  C) (Oral)   Resp 20   Ht 1.626 m (5' 4\")   Wt 97.5 kg (215 lb)   LMP  (LMP Unknown)   SpO2 99%   BMI 36.90 kg/m        Patient presents with: Pt BIBA from OSH with abnormal labs, admission, and consults. No reports of fever, SOB, CP, or travel.    Home Treatments/Remedies: home medications, vanco running    Febrile / Afebrile: afebrile     Duration of C/o: n/a    Yara Cesar RN  December 8, 2020        "

## 2020-12-08 NOTE — ED NOTES
Bed: ED03  Expected date:   Expected time:   Means of arrival:   Comments:  PT: Toa, 41 yo cholangeocarcinoma on chemo. Transfer from [facility]. Needs ICU bed and gen surgery consult.      Devonte Hunter MD  12/08/20 [id number]

## 2020-12-08 NOTE — ED PROVIDER NOTES
"ED Provider Note  [facility]      History     Chief Complaint   Patient presents with     Abnormal Labs     The history is provided by the patient and medical records. The history is limited by the condition of the patient.     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 Per medical record patient presented with abdominal pain and \"grogginess.\"  Here patient notes that she was having difficulty urinating that started over the weekend along with constipation.  Her daughter came today and stated she did not look well and felt that she needed to be seen in the emergency department.  She denies diarrhea, bloody stools, or dysuria leading up to her ED visit today.    Per chart review, patient was seen earlier at [facility]. Urine test was positive for bacteria, ammonia 58, lactic acid 8.4, AST 63, bilirubin 5.2, BUN 30, glucose 198, EST GFR 40, platelet count 92. See below for CT abdomen/pelvis impression. Covid test was negative.  She was sent to the [facility] for ICU admission and general surgery consultation.    Patient appears to be somnolent and unable to provide a clear medical history. She points to her lower abdomen as site of pain. She notes baseline shortness of breath. Her fiance is on his way here.         CT Abdomen/Pelvis, [facility], 12/8/2020 - Impression:  1.  Lobular necrotic appearing lesion at the pancreatic head extending into the adjacent aspect of the liver. This measures up to 7.7 cm.    2.  Large necrotic retroperitoneal lesion abutting the third portion of the duodenum and inferior to the uncinate process of the pancreas. No pancreatic duct dilatation.    3. "  Metastasis to the right lobe of the liver.    4.  Portal vein and portal confluence occlusion. The SMV and splenic vein appear patent. There are marked epigastric and retroperitoneal collateral vessels.    5.  Extrinsic compression on the inferior vena cava.    6.  Ascites.    7.  There is marked mural thickening and edema involving the transverse and descending colon. Appearance nonspecific however given infectious and ischemic etiologies are considered.        Past Medical History  Past Medical History:   Diagnosis Date     Allergic rhinitis      Past Surgical History:   Procedure Laterality Date     CHOLECYSTECTOMY  2001     ESOPHAGOSCOPY, GASTROSCOPY, DUODENOSCOPY (EGD), COMBINED N/A 8/3/2020    Procedure: ESOPHAGOGASTRODUODENOSCOPY, WITH BIOPSY;  Surgeon: Kendrick Santizo MD;  [facility]          docusate sodium (COLACE) 100 MG capsule       FLUoxetine 20 MG tablet       hydrochlorothiazide (HYDRODIURIL) 25 MG tablet       lidocaine-prilocaine (EMLA) 2.5-2.5 % external cream       LORazepam (ATIVAN) 0.5 MG tablet       mirtazapine (REMERON) 15 MG tablet       naloxone (NARCAN) 4 MG/0.1ML nasal spray       ondansetron (ZOFRAN) 8 MG tablet       ondansetron (ZOFRAN) 8 MG tablet       ondansetron (ZOFRAN-ODT) 4 MG ODT tab       oxyCODONE IR (ROXICODONE) 10 MG tablet       polyethylene glycol (MIRALAX) powder       prochlorperazine (COMPAZINE) 10 MG tablet       prochlorperazine (COMPAZINE) 5 MG tablet       risperiDONE (RISPERDAL) 1 MG tablet      No Known Allergies  Family History  Family History   Problem Relation Age of Onset     Diabetes Mother      Hypertension Mother      Suicide Father 35     Social History   Social History     Tobacco Use     Smoking status: Never Smoker     Smokeless tobacco: Never Used   Substance Use Topics     Alcohol use: No     Drug use: No      Past medical history, past surgical history, medications, allergies, family history, and social history were reviewed with the patient.  "No additional pertinent items.       Review of Systems   Gastrointestinal: Positive for abdominal pain (Lower).   Neurological: Positive for weakness.   All other systems reviewed and are negative.    A complete review of systems was performed with pertinent positives and negatives noted in the HPI, and all other systems negative.    Physical Exam   BP: 131/81  Pulse: 115  Temp: 97.9  F (36.6  C)  Resp: 20  Height: 162.6 cm (5' 4\")  Weight: 97.5 kg (215 lb)  SpO2: 99 %  Physical Exam  General: Patient appears somewhat vacant, sleepy but does answer questions appropriately.  Slowed and muted responses.  Head: normal cephalic  HEENT: pupils equal, conjugate gaze in tact, scleral icterus noted   neck: Supple  CV: regular rate and rhythm without murmur  Lungs: clear to ascultation  Abd: soft, diffusely tender, no guarding, no peritoneal signs  EXT: lower extremities without swelling or edema  Neuro: awake, answers questions appropriately. No focal deficits noted  Skin: No jaundice noted      ED Course     3:47 PM  The patient was seen and examined by Devonte Hunter MD in Room ED03.     Procedures  Results for orders placed during the hospital encounter of 12/08/20   POC US ABDOMEN LIMITED    Impression Limited Bedside Cardiac Ultrasound, performed and interpreted by me.   Indication: Weakness.  Parasternal long axis, parasternal short axis and apical 4 chamber views were acquired.   Image quality was not satisfactory though was able to obtain a parasternal long axis view.    Findings:    Global left ventricular function appears intact.  Chambers do not appear dilated.  There is no evidence of free fluid within the pericardium.    IMPRESSION: Grossly normal left ventricular function and chamber size.  No pericardial effusion.               EKG Interpretation:      Interpreted by Devonte Hunter MD  Time reviewed: 1844  Symptoms at time of EKG: sinus tachycardia    Rhythm: normal sinus   Rate: normal  Axis: normal  Ectopy: " none  Conduction: normal  ST Segments/ T Waves: Nonspecific T wave abnormalities  Q Waves: none  Comparison to prior: Nonspecific T wave abnormalities    Clinical Impression: normal EKG               Results for orders placed or performed during the hospital encounter of 12/08/20   POC US ABDOMEN LIMITED     Status: None    Impression    Limited Bedside Cardiac Ultrasound, performed and interpreted by me.   Indication: Weakness.  Parasternal long axis, parasternal short axis and apical 4 chamber views were acquired.   Image quality was not satisfactory though was able to obtain a parasternal long axis view.    Findings:    Global left ventricular function appears intact.  Chambers do not appear dilated.  There is no evidence of free fluid within the pericardium.    IMPRESSION: Grossly normal left ventricular function and chamber size.  No pericardial effusion.     CBC with platelets differential     Status: Abnormal   Result Value Ref Range    WBC 5.2 4.0 - 11.0 10e9/L    RBC Count 3.51 (L) 3.8 - 5.2 10e12/L    Hemoglobin 12.1 11.7 - 15.7 g/dL    Hematocrit 35.8 35.0 - 47.0 %     (H) 78 - 100 fl    MCH 34.5 (H) 26.5 - 33.0 pg    MCHC 33.8 31.5 - 36.5 g/dL    RDW 16.7 (H) 10.0 - 15.0 %    Platelet Count 75 (L) 150 - 450 10e9/L    Diff Method Manual Differential     % Neutrophils 90.7 %    % Lymphocytes 5.1 %    % Monocytes 4.2 %    % Eosinophils 0.0 %    % Basophils 0.0 %    Absolute Neutrophil 4.7 1.6 - 8.3 10e9/L    Absolute Lymphocytes 0.3 (L) 0.8 - 5.3 10e9/L    Absolute Monocytes 0.2 0.0 - 1.3 10e9/L    Absolute Eosinophils 0.0 0.0 - 0.7 10e9/L    Absolute Basophils 0.0 0.0 - 0.2 10e9/L    Anisocytosis Moderate     Macrocytes Present     Platelet Estimate Confirming automated cell count    Comprehensive metabolic panel     Status: Abnormal   Result Value Ref Range    Sodium 132 (L) 133 - 144 mmol/L    Potassium 3.2 (L) 3.4 - 5.3 mmol/L    Chloride 98 94 - 109 mmol/L    Carbon Dioxide 25 20 - 32 mmol/L     Anion Gap 10 3 - 14 mmol/L    Glucose 183 (H) 70 - 99 mg/dL    Urea Nitrogen 28 7 - 30 mg/dL    Creatinine 0.80 0.52 - 1.04 mg/dL    GFR Estimate 88 >60 mL/min/[1.73_m2]    GFR Estimate If Black >90 >60 mL/min/[1.73_m2]    Calcium 8.1 (L) 8.5 - 10.1 mg/dL    Bilirubin Total 4.6 (H) 0.2 - 1.3 mg/dL    Albumin 1.9 (L) 3.4 - 5.0 g/dL    Protein Total 7.9 6.8 - 8.8 g/dL    Alkaline Phosphatase 88 40 - 150 U/L    ALT 54 (H) 0 - 50 U/L    AST 57 (H) 0 - 45 U/L   Lactic acid whole blood     Status: Abnormal   Result Value Ref Range    Lactic Acid 5.9 (HH) 0.7 - 2.0 mmol/L   Ammonia     Status: None   Result Value Ref Range    Ammonia 46 10 - 50 umol/L   Asymptomatic COVID-19 Virus (Coronavirus) by PCR     Status: None    Specimen: Nasopharyngeal   Result Value Ref Range    COVID-19 Virus PCR to [facility] - Source Nasopharyngeal     COVID-19 Virus PCR to [facility] - Result       Test received-See reflex to IDDL test SARS CoV2 (COVID-19) Virus RT-PCR   Lipase     Status: Abnormal   Result Value Ref Range    Lipase 55 (L) 73 - 393 U/L   INR     Status: Abnormal   Result Value Ref Range    INR 1.67 (H) 0.86 - 1.14   SARS-CoV-2 COVID-19 Virus (Coronavirus) RT-PCR Nasopharyngeal     Status: None    Specimen: Nasopharyngeal   Result Value Ref Range    SARS-CoV-2 Virus Specimen Source Nasopharyngeal     SARS-CoV-2 PCR Result NEGATIVE     SARS-CoV-2 PCR Comment       Testing was performed using the Xpert Xpress SARS-CoV-2 Assay on the Cepheid Gene-Xpert   Instrument Systems. Additional information about this Emergency Use Authorization (EUA)   assay can be found via the Lab Guide.     ABO/Rh type and screen     Status: None   Result Value Ref Range    ABO B     RH(D) Pos     Antibody Screen Neg     Test Valid Only At          [facility]    Specimen Expires 12/11/2020      Medications   lactated ringers infusion ( Intravenous Stopped 12/8/20 1730)   potassium chloride 10 mEq in 100 mL  sterile water intermittent infusion (premix) (has no administration in time range)   piperacillin-tazobactam (ZOSYN) 3.375 g vial to attach to  mL bag (has no administration in time range)   lactated ringers BOLUS 1,000 mL (1,000 mLs Intravenous New Bag 12/8/20 7294)        Assessments & Plan (with Medical Decision Making)   This is a 50-year-old female who presents as a transfer from [facility] awaiting ICU admission and needing general surgery consultation.  Here, she is tachycardic, she is somnolent but does answer questions appropriately, and she is diffusely jaundiced.  Patient has a relatively benign abdominal exam though given her overall toxic appearance would be concerned about a ischemic bowel given CT findings and a lactic acid of 8.    Reviewed medication, she had gotten 1 L of fluid, vancomycin, and Zosyn.  Bedside echo showed good cardiac contractility.  Will give a second liter of IV fluids.  Patient will need more fluid resuscitation.  Did repeat all labs including CBC, BMP, lactic acid, and blood cultures.  Reviewed outside imaging, placed general surgical consultation, and awaiting repeat labs.      Repeat labs show downtrending lactic acid at 5.9, low potassium which was placed 5 via IV.  Normal white count, no left shift.  Slightly low sodium.  Elevated bilirubin of 4.6 which is significantly elevated from several days ago.  Normal ammonia level.  Blood cultures are pending.  INR amount minimally elevated.    Surgery came and evaluated the patient, patient was not deemed a surgical candidate.  They were unclear if this mass is causing an ischemic bowel, they had no contraindication to anticoagulation in the setting of portal vein thrombosis.    Discussed with ICU, given her stable vital signs and capacity issues felt that she should be admitted to the [facility].  Discussed case with oncology moonlighter and hospitalist, plan to give an additional liter of fluid and reassess her lactic  acid.  They will discuss the case directly with hematology to see if anticoagulation is in the patient's best interest, concerned that it could potentially worsen her ischemic colitis if that is indeed the underlying cause of her elevated lactic acid and CT findings.    Continued her Zosyn while in the ER.  Blood cultures pending.    Patient was given 1 2 L of lactated ringer here in the emergency department.    Critical care time for this patient is 60 minutes this includes bedside time, reviewing outside records, and discussing the care with specialist and consultants.  Includes documentation time.    Final disposition is pending patient will either go to the intensive care unit or intermediate care unit depending upon bed availability.  Her vitals remained unchanged while in the emergency department including normotensive, mild tachycardia, unchanged mental status exam.  Normal work of breathing normal oxygen saturation.    This part of the medical record was transcribed by Amos Cho Medical Scribe, from a dictation done by Devonte Kilgore MD.        I have reviewed the nursing notes. I have reviewed the findings, diagnosis, plan and need for follow up with the patient.    New Prescriptions    No medications on file       Final diagnoses:   Elevated bilirubin   Lactic acidosis   Portal vein thrombosis   Cholangiocarcinoma (H)     I, Amos Cho, am serving as a trained medical scribe to document services personally performed by Devonte Kilgore MD, based on the provider's statements to me.      I, Devonte Kilgore MD, was physically present and have reviewed and verified the accuracy of this note documented by Amos Cho.     --  Devonte EARLY [facility] EMERGENCY DEPARTMENT  12/8/2020     Devonte Kilgore MD  12/08/20 3521

## 2020-12-08 NOTE — PROGRESS NOTES
Saw and evaluated pt. Please see full Consult note    50F with advanced S t4 cholangio, deemed non-op, presents with decreased mental status, lactemia and potential evidence of colon ischemia possibly due to tumor involvement. Unfortunately, operating on this patient is not feasible, and she would benefit instead from continued resuscitation and antibiotics. Surgery would not reverse the underlying process, cause severe morbidity with ostensible complications and potentially death, and would be significantly more hazardous due to liver failure and ascites (MELD 27). We discussed this [facility] patient and fiance (by phone); risks significantly outweigh benefits of laparotomy. Patient expressed understanding, though her mental status seemed depressed a bit.     Recommend MICU admit, IVF resusc, ABX, palliative consult.    Again, see full consult note.    Rajinder Joshi MD  , Advanced GI/Bariatric Surgery

## 2020-12-08 NOTE — CONSULTS
General Surgery Consult Note     Joesph Zeng MRN# [id number]   YOB: 1970 Age: 50 year old      Date of Admission:  12/8/2020    Reason for Consult: Concern for ischemic colitis  Consulting Service: ED  Consulting Physician: Dr. Hunter    Assessment: Joesph Zeng is a(n) 50 year old year old female with known cholangiocarcinoma on palliative chemotherapy, unresectable, who presents with concern for ischemic colitis.    Plan:  - It is unclear of the absolute cause of the potential ischemia, however it is clear her liver function continues to be poor, and no feasible resection would improve any potential compression by her tumor.  Not only would potential operative mortality be very high, but no feasible intervention for prolonging her long term mortality would be offered given the status of her cholangiocarcinoma.   - I discussed these recommendation with both the patient and her fiance, at her request. I was unable to reach her daughter by phone.  I placed their numbers in the demographics.  - Recommend fluid resuscitation, IV antibioitics, and palliative care consult, she would like full aggressive medical cares at this time.    Discussed with staff, Dr. Joshi.  -------------------    HPI: Joesph Zeng is a(n) 50 year old year old female with known cholangiocarcinoma on palliative chemotherapy, unresectable, presents with abdominal pain.  24 hours in duration, worse in lower abdomen.  Mild nausea, no vomiting.  Unsure of fevers/chills.    Past Medical History:  Past Medical History:   Diagnosis Date     Allergic rhinitis        Past Surgical History:  Past Surgical History:   Procedure Laterality Date     CHOLECYSTECTOMY  2001     ESOPHAGOSCOPY, GASTROSCOPY, DUODENOSCOPY (EGD), COMBINED N/A 8/3/2020    Procedure: ESOPHAGOGASTRODUODENOSCOPY, WITH BIOPSY;  Surgeon: Kendrick Santizo MD;  [facility]       Allergies:   No Known Allergies    Medications:  No current facility-administered medications on file prior  to encounter.        docusate sodium (COLACE) 100 MG capsule, Take 1 capsule (100 mg) by mouth 2 times daily (Patient not taking: Reported on 12/4/2020)       FLUoxetine 20 MG tablet, 20 mg 2 times daily       hydrochlorothiazide (HYDRODIURIL) 25 MG tablet, Take 1 tablet (25 mg) by mouth daily       lidocaine-prilocaine (EMLA) 2.5-2.5 % external cream, Apply topically as needed for moderate pain (Patient not taking: Reported on 12/4/2020)       LORazepam (ATIVAN) 0.5 MG tablet, Take 2 tablets (1 mg) by mouth every 4 hours as needed (Anxiety, Nausea/Vomiting or Sleep)       mirtazapine (REMERON) 15 MG tablet, Take 15 mg by mouth       naloxone (NARCAN) 4 MG/0.1ML nasal spray, Spray 1 spray (4 mg) into one nostril alternating nostrils once as needed for opioid reversal every 2-3 minutes until assistance arrives (Patient not taking: Reported on 12/4/2020)       ondansetron (ZOFRAN) 8 MG tablet, Take 1 tablet (8 mg) by mouth every 8 hours as needed for nausea       ondansetron (ZOFRAN) 8 MG tablet, Take 1 tablet (8 mg) by mouth every 8 hours as needed (Nausea/Vomiting)       ondansetron (ZOFRAN-ODT) 4 MG ODT tab, Take 1 tablet (4 mg) by mouth every 8 hours as needed for nausea or vomiting (Patient not taking: Reported on 12/4/2020)       oxyCODONE IR (ROXICODONE) 10 MG tablet, Take 1 tablet (10 mg) by mouth every 6 hours as needed for severe pain       polyethylene glycol (MIRALAX) powder, Take 17 g (1 capful) by mouth daily (Patient not taking: Reported on 12/4/2020)       prochlorperazine (COMPAZINE) 10 MG tablet, Take 0.5 tablets (5 mg) by mouth every 6 hours as needed (Nausea/Vomiting)       prochlorperazine (COMPAZINE) 5 MG tablet, Take 1 tablet (5 mg) by mouth every 6 hours as needed for nausea or vomiting       risperiDONE (RISPERDAL) 1 MG tablet, 1 mg once        Social History:  Social History     Socioeconomic History     Marital status: Single     Spouse name: Not on file     Number of children: Not on file  "    Years of education: Not on file     Highest education level: Not on file   Occupational History     Not on file   Social Needs     Financial resource strain: Not on file     Food insecurity     Worry: Not on file     Inability: Not on file     Transportation needs     Medical: Not on file     Non-medical: Not on file   Tobacco Use     Smoking status: Never Smoker     Smokeless tobacco: Never Used   Substance and Sexual Activity     Alcohol use: No     Drug use: No     Sexual activity: Yes     Partners: Male   Lifestyle     Physical activity     Days per week: Not on file     Minutes per session: Not on file     Stress: Not on file   Relationships     Social connections     Talks on phone: Not on file     Gets together: Not on file     Attends Religion service: Not on file     Active member of club or organization: Not on file     Attends meetings of clubs or organizations: Not on file     Relationship status: Not on file     Intimate partner violence     Fear of current or ex partner: Not on file     Emotionally abused: Not on file     Physically abused: Not on file     Forced sexual activity: Not on file   Other Topics Concern     Parent/sibling w/ CABG, MI or angioplasty before 65F 55M? Not Asked   Social History Narrative     Not on file       Family History:  Family History   Problem Relation Age of Onset     Diabetes Mother      Hypertension Mother      Suicide Father 35       ROS:  As noted above. No headache, dizziness. No fever, chills. No chest pain or shortness of breath. No abdominal pain, nausea, vomiting. No diarrhea or constipation. No urinary difficulties. No muscle or body aches.    Exam:  /68   Pulse 115   Temp 97.9  F (36.6  C) (Oral)   Resp 20   Ht 1.626 m (5' 4\")   Wt 97.5 kg (215 lb)   LMP  (LMP Unknown)   SpO2 97%   BMI 36.90 kg/m    General: Alert, interactive, moderately uncomfortable  Resp: CTAB, no crackles or wheezes  Cardiac: Tachcyardic  Abdomen: Soft, tender to " palpation throughout, no clear peritonitis, moderate distension.  Extremities: No LE edema or obvious joint abnormalities  Skin: Warm and dry, no jaundice or rash    Labs:  Cr 0.8  Lactate 5.9 from 8  WBC 5.2    Imaging:     CTAP:    IMPRESSION:     1.  Lobular necrotic appearing lesion at the pancreatic head extending into the adjacent aspect of the liver. This measures up to 7.7 cm.  2.  Large necrotic retroperitoneal lesion abutting the third portion of the duodenum and inferior to the uncinate process of the pancreas. No pancreatic duct dilatation.  3.  Metastasis to the right lobe of the liver.  4.  Portal vein and portal confluence occlusion. The SMV and splenic vein appear patent. There are marked epigastric and retroperitoneal collateral vessels.  5.  Extrinsic compression on the inferior vena cava.  6.  Ascites.  7.  There is marked mural thickening and edema involving the transverse and descending colon. Appearance nonspecific however given infectious and ischemic etiologies are considered.    --    Conor Roland MD  General Surgery, PGY-7  pgr: [phone]    5:09 PM, 12/8/2020

## 2020-12-08 NOTE — LETTER
Health Information Management Services               Recipient:  [facility]        Sender:  Michelle Roque, [facility] HospitalSW [phone]        Date: December 11, 2020  Patient Name:  Joesph Zeng  Routing Message:    Discharge/Hospice orders        The documents accompanying this notice contain confidential information belonging to the sender.  This information is intended only for the use of the individual or entity named above.  The authorized recipient of this information is prohibited from disclosing this information to any other party and is required to destroy the information after its stated need has been fulfilled, unless otherwise required by state law.      If you are not the intended recipient, you are hereby notified that any disclosure, copy, distribution or action taken in reliance on the contents of these documents is strictly prohibited.  If you have received this document in error, please return it by fax to [phone] with a note on the cover sheet explaining why you are returning it (e.g. not your patient, not your provider, etc.).  If you need further assistance, please call [facility] Centralized Transcription at [phone].  Documents may also be returned by mail to [employer], , [facility] [address], [place], [place] [address].

## 2020-12-09 NOTE — PHARMACY-VANCOMYCIN DOSING SERVICE
Pharmacy Vancomycin Initial Note  Date of Service 2020  Patient's  1970  50 year old, female    Indication: Bacteremia    Current estimated CrCl = CrCl cannot be calculated (This lab value cannot be used to calculate CrCl because it is not a number: PENDING).    Creatinine for last 3 days  2020:  3:55 PM Creatinine 0.80 mg/dL  2020:  4:19 AM Creatinine PENDING mg/dL    Recent Vancomycin Level(s) for last 3 days  No results found for requested labs within last 72 hours.      Vancomycin IV Administrations (past 72 hours)      No vancomycin orders with administrations in past 72 hours.                Nephrotoxins and other renal medications (From now, onward)    Start     Dose/Rate Route Frequency Ordered Stop    20 0600  vancomycin (VANCOCIN) 2,000 mg in sodium chloride 0.9 % 500 mL intermittent infusion      2,000 mg  over 2 Hours Intravenous EVERY 12 HOURS 20 0529      20 1900  piperacillin-tazobactam (ZOSYN) 3.375 g vial to attach to  mL bag      3.375 g  over 30 Minutes Intravenous EVERY 6 HOURS 20 1659            Contrast Orders - past 72 hours (72h ago, onward)    None                Plan:  1.  Start vancomycin  2000 mg IV q12h.   2.  Goal Trough Level: 15-20 mg/L   3.  Pharmacy will check trough levels as appropriate in 1-3 Days.    4. Serum creatinine levels will be ordered daily for the first week of therapy and at least twice weekly for subsequent weeks.    5. [place] method utilized to dose vancomycin therapy: Method 2    Raymundo Toussaint [facility]

## 2020-12-09 NOTE — PROVIDER NOTIFICATION
-------------------CRITICAL LAB VALUE-------------------    Lab Value:  Time of notification: 6:39 AM  MD notified: Page sent of critical procalitonin of 7.52 to [facility] crosscover x1221. Also paged that blood cx from 12/8 positive for gram negative rods.  Patient status: Stable  Temp:  [97.9  F (36.6  C)-99.8  F (37.7  C)] 99.8  F (37.7  C)  Pulse:  [110-123] 112  Resp:  [16-20] 16  BP: (109-138)/(64-84) 119/69  SpO2:  [96 %-100 %] 96 %  Orders received:   None yet

## 2020-12-09 NOTE — PROGRESS NOTES
Transfer  Transferred from: ED   Via: cart  Reason for transfer: Pt appropriate for 6B- elevated lactic  Family: Aware of transfer  Belongings: Received with pt. Cell phone and  only.   Chart: Received with pt  Medications: Meds received from old unit with pt  Code Status verified on armband: yes  2 RN Skin Assessment Completed By: Carlie Healy  Med rec completed: no  Bed surface reassessed with algorithm and charted: no  New bed surface ordered: no    Report received from: CELESTINO Abernathy  Pt status:  Somnolent but arouses spontaneously. Whispers. Oriented x4. C/o thirst. LR infusing at 125mL/hr into port. Report given to CELESTINO Healy.   B/P: 123/70, T: 99.2, P: 112, R: 16

## 2020-12-09 NOTE — CONSULTS
[facility]  Palliative Care Consultation Note    Patient: Joesph Zeng  Date of Admission:  12/8/2020    Requesting Clinician / Team: Alexys Leblanc MD  Reason for consult: Goals of care  Decisional support    Recommendations:    DNR/DNI, goals are comfort focused overall    Discontinue labs, monitor, and routine vital signs    Discontinue antibiotics    Entered comfort measures orders    - if only moderate benefit from max dose hydromorphone or lorazepam would do 50% dose increase  - if minimal to no benefit from max dose hydromorphone or lorazepam would do 100% dose increase    These recommendations have been discussed with primary team.      Thank you for the opportunity to participate in the care of this patient and family. Our team: will continue to follow.     During regular M-F work hours -- if you are not sure who specifically to contact -- please contact us by sending a text page to our team consult pager at [phone].    After regular work hours and on weekends/holidays, you can call our answering service at [phone]. Also, who's on call for us is available in [facility] Smart Web.       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 She presented with abdominal pain and an elevated lactic acid which stabilized after fluids. She had a abdominal CT which showed a necrotic pancreatic mass abutting on the duodenum third portion and portal vein occlusion and ischemic colitis in the transverse and descending colon. She was evaluated by surgery and is not a surgical candidate given her liver dysfunction and malignancy.     Her labs seem to be improving so there is ongoing consideration of continued antibiotics. Also consideration of a diagnostic paracentesis.      Yesterday she and her fiance had a discussion with her doctor about goals of care, and determined she wishes to be DNR/DNI and if she decompensates she wished to be kept comfortable and die in the hospital.     Today, the patient was seen for:  Intrahepatic cholangiocarcinoma stage Marybeth with apparent carolann involvement on CT    Prognosis, Goals, & Planning:      Functional Status just prior to hospitalization: 2 (Ambulatory and capable of all selfcare but unable to carry out any work activities; may need help with IADLs up and about > 50% of waking hours)    3 (Capable of only limited self-care; needs help with ADLs; in bed/chair >50% of waking hours)      Prognosis, Goals, and/or Advance Care Planning were addressed today: Yes        Summary/Comments: met with patient today, discussed her understanding of her illness. She has good understanding and she wants to focus on hospice care and also wants to die in the hospital. She also understands that time is very short. Discussed with her how she has made this decision and she says that she has been thinking about this over the last few days and she has noticed a decline in her health. She feels that her priorities at this time are to make sure she has no pain and dies comfortably. She also finds it very important that she sees her children and completes a health care directive if able. Discussed the importance of her being awake and alert to finish her health care directive and visit with her children. And also discussed the difficulty of having that be possible and treating her pain so she does not feel it at all. She ultimately feels that completing her HCD and seeing her family is more important at this time.     At this time we also discussed the possibility that she is in the dying process and declines overnight, as I am concerned this very likely could happen. Discussed if that is the case we would increase our aggressiveness of her symptom management at that  time.     Returned for a visit with patient, discussed concern that she could decline rapidly overnight, she wishes to stop all life prolonging measures and aggressively treat her symptoms at this time.      Patient's decision making preferences: independently          Patient has decision-making capacity today for complex decisions: Yes            I have concerns about the patient/family's health literacy today: No           Patient has a completed Health Care Directive: No.       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 She presented with abdominal pain and an elevated lactic acid which stabilized after fluids. She had a abdominal CT which showed a necrotic pancreatic mass abutting on the duodenum third portion and portal vein occlusion and ischemic colitis in the transverse and descending colon. She was evaluated by surgery and is not a surgical candidate given her liver dysfunction and malignancy.     Her labs seem to be improving so there is ongoing consideration of continued antibiotics. Also consideration of a diagnostic paracentesis.     Yesterday she and her fiance had a discussion with her doctor about goals of care, and determined she wishes to be DNR/DNI and if she decompensates she wished to be kept comfortable and die in the hospital.     Palliative care consulted 12/9 for  goals of care    Key Palliative Symptom Data:  # Pain severity the last 12 hours: severe  # Dyspnea severity the last 12 hours: severe  # Nausea severity the last 12 hours: moderate  # Anxiety severity the last 12 hours: low        ROS:  Comprehensive ROS is reviewed and is negative except as here & per HPI:      Past Medical History:  Past Medical History:   Diagnosis Date     Allergic rhinitis         Past Surgical History:  Past Surgical History:   Procedure Laterality Date     CHOLECYSTECTOMY  2001     ESOPHAGOSCOPY, GASTROSCOPY, DUODENOSCOPY (EGD), COMBINED N/A 8/3/2020    Procedure: ESOPHAGOGASTRODUODENOSCOPY, WITH BIOPSY;  Surgeon: Kendrick Santizo MD;  Location:  OR         Family History:  Family History   Problem Relation Age of Onset     Diabetes Mother      Hypertension Mother      Suicide Father 35         Allergies:  No Known Allergies     Medications:  I have reviewed this patient's medication profile and medications from this hospitalization.   Noted:  Fluoxetine 20 mg BID  miralax daily  Senna 1- 2 tablets BID  Hydromorphone 0.2-0.3 mg q2h PRN- x1  Ondansetron PRN- x1      Physical Exam:  Vital Signs: Temp: 98.2  F (36.8  C) Temp src: Axillary BP: 119/69 Pulse: 108   Resp: 12 SpO2: 96 % O2 Device: None (Room air)    Weight: 229 lbs 11.51 oz    Constitutional: Awake, lethargic, cooperative, no apparent distress  ENT: Normocephalic, without obvious abnormality, atramatic  Lungs: No increased work of breathing, good air exchange  Cardiovascular: tachycardic, normal S1 and S2  Musculoskeletal: No redness, warmth, or swelling of the joints.   Neurologic: Awake, lethargic, oriented to name, place and time.    Neuropsychiatric: Normal affect, mood, orientation, memory and insight. Slow to answer questions however does answer appropriately.   Skin: No rashes, erythema, pallor, petechia or purpura.      Data reviewed:  Recent imaging reviewed, my comments on pertinents:   US abdomen 12/8  Findings:      Global left ventricular function appears intact.   Chambers do not appear dilated.   There is no evidence of free fluid within the pericardium.     Recent lab data reviewed, my comments on pertinents:   Sodium 134  Potassium 3.1  Creatinine 0.63  GFR > 90  Lactic acid 2.8  WBC 6.7  Hemoglobin 10.7  Platelets 55    CRISTINE Campbell CNS  Palliative Care Consult Team  Pager: [phone]    90 minutes spent. This includes 60 minutes face to face with patient discussing current complex health conditions, prognosis, goals of care, and symptom management. Coordination of care with the primary team, palliative SW, and unit SW regarding goals of care, prognosis, psychosocial support, and symptom management.

## 2020-12-09 NOTE — PROVIDER NOTIFICATION
-------------------CRITICAL LAB VALUE-------------------    Lab Value: 2.4 lactic  Time of notification: 3:00 PM  MD notified: Gold physician   Patient status:  Temp:  [97.9  F (36.6  C)-99.8  F (37.7  C)] 97.9  F (36.6  C)  Pulse:  [108-123] 108  Resp:  [12-20] 18  BP: (109-138)/(64-91) 110/91  SpO2:  [96 %-100 %] 96 %  Orders received: no new orders at this time

## 2020-12-09 NOTE — PROGRESS NOTES
.Time of notification: 3:35 AM  Provider notified: OSH called with +Blood cultures of gram negative rods on preliminary/Paged also that repeat lactic acid was 3.9 down from 5.9  Patient status:Stable  Temp:  [97.9  F (36.6  C)-99.2  F (37.3  C)] 99.2  F (37.3  C)  Pulse:  [110-123] 112  Resp:  [16-20] 16  BP: (109-138)/(64-84) 123/70  SpO2:  [96 %-100 %] 96 %  Orders received:   Test page sent to MD.

## 2020-12-09 NOTE — CONSULTS
BRIEF SOCIAL WORK NOTE:    SW consulted for HCD. SW coordinated with Palliative SW (Wendi) and Palliative APRN (Jennifer) today. Palliative SW is already assisting in completion of HCD; SW will close consult. Per update from Palliative Team, Pt will likely transition to full comfort cares tomorrow and it is anticipated she will pass in the hospital. No discharge planning needs.    SW remains available as needed during this hospitalization.     ROC Salgado, [facility] HospitalSW  6B Intermediate Care Unit   [facility]  Phone: [phone]  Pager: [phone]

## 2020-12-09 NOTE — TELEPHONE ENCOUNTER
Medical oncology attending physician encounter note    Tuesday December 8, 2020    I was paged at 5:24 pm by general medicine hospitalist Dr. Margaret Leblanc regarding the urgent situation for this patient, who was transferred from [facility] for urgent ischemic colitis. Ms. Zeng has metastatic intrahepatic cholangiocarcinoma and is receiving palliative/non-curative intent chemotherapy under the care of medical oncologist Dr. Fraser, with whom she met four days ago for follow-up.     A CT scan done at [facility] today showed urgent findings as below (summary copied verbatim via Care Everywhere):         IMPRESSION:     1.  Lobular necrotic appearing lesion at the pancreatic head extending into the adjacent aspect of the liver. This measures up to 7.7 cm.  2.  Large necrotic retroperitoneal lesion abutting the third portion of the duodenum and inferior to the uncinate process of the pancreas. No pancreatic duct dilatation.  3.  Metastasis to the right lobe of the liver.  4.  Portal vein and portal confluence occlusion. The SMV and splenic vein appear patent. There are marked epigastric and retroperitoneal collateral vessels.  5.  Extrinsic compression on the inferior vena cava.  6.  Ascites.  7.  There is marked mural thickening and edema involving the transverse and descending colon. Appearance nonspecific however given infectious and ischemic etiologies are considered.      The patient has been evaluated by our general surgery team, and surgery was not recommended. For treatment of the colitis, IV fluids, antibiotics, and supportive care were suggested. The general medicine team reached out to me and our medical oncology team requesting clarification regarding prognosis. I reviewed that with unresectable cholangiocarcinoma, which is not curable, and an acute event presenting in this fashion with critical illness, the immediate prognosis is likely poor and the patient is at risk of death within the  next 24 hours. Supportive care would be appropriate considering the fact that there will be no aggressive surgical intervention. The patient is currently listed as full code; my recommendation is goals of care discussion to include code discussion with supportive care and to avoid heroic measures in the event of cardiac or pulmonary arrest. Intubation and code would not be likely to lead to meaningful recovery in light of this acute event. I have spoken twice with general medicine cross-cover physician Dr. Margaret Leblanc between 5:30 and 6:15 pm and conveyed the above assessment and recommendations following my review and recent and today's acute events, and offered to be available for further consultation if needed.    Isidro Rodriguez MD PhD

## 2020-12-09 NOTE — PLAN OF CARE
Neuro: A&Ox4. Lethergic, closes eyes during conversation, drifts to slump over when talking, soft spoke, whispers, difficult to understand at time due to whispering.  Cardiac: SR. VSS Afebrile  Respiratory: Sating mid 90;s on RA.  GI/: Adequate urine output. Per rod more franko this am. No blood noted. Small BM X1 after pt feeling more abdominal pressure.   Diet/appetite: Tolerating NPO diet. Pt would like a meal  Activity:  Assist of 1, up to chair and in halls.  Pain: At acceptable level on current regimen. Denies  Skin: No new deficits noted.Bruising on left abdomen and  left shin noted from previous fall.  LDA's:  Port right chest and left piv infusing iv fluids  Plan: Sw consult needed this am for goals of care, continue with POC. Notify primary team with changes.

## 2020-12-09 NOTE — PROGRESS NOTES
Paged by RN, RE:    Correction.  Outside hospital blood cultures with GNR. Blood cultures here with GNR as well.     - repeat Blood cultures  - continue current antibiotics    Walter Carlson MD  [phone]

## 2020-12-09 NOTE — H&P
[facility]     History and Physical - Hospitalist Service, Gold Night       Date of Admission:  12/8/2020    Assessment & Plan   Joesph Zeng is a 50 year old female with intrahepatic cholangiocarcinoma stage Marybeth  diagnosed in Oct 2019 on pallaitive gemzar/cisplatin since Aug 2020, last visit with  on Dec 04 2020 and started cycle 4 Day 1 that day,  who is presenting with abdominal pain with elevated lactic acid of 5.9 which is stable after fluid resucitation, CT abdomen pelvis that shows a necrotic pancreatic mass abutting on the duodenum third portion and portal vein occlusion and ischemic colitis in the transverse and descending colon.    1.  Lactic acidosis:  DDx: Etiology-infectious vs Ischemic colitis vs Type B lactic acidosis from malignancy vs poor liver dysfunction  Infectious  Diagnostics:   - Blood culture x 1 from port sent  - UA is  Unremarkable for UTI  - CXR not significant for pneumonia  - Possibility of Cholangitis given the mass is abutting on the third portion of the duodenum and patient has an elevated bilirubin of 4.6, however, no biliary dilatation seen on CT, and patient does not have any symptoms in her right upper quadrant and no tenderness to palpation.  Less likely this is cholangitis no further imaging from an abdominal standpoint ordered at this time  -Patient has ascites-SBP is a possibility.  Would benefit from a diagnostic paracentesis.  However, given patient is choosing towards a comfort route-see below-currently did not place orders. Needs to be evaluated by day  team to see if patient would benefit from a diagnostic paracentesis  -Possible that the large necrotic appearing mass is a source of infection  -Mural thickening and colitis could be infectious.  If patient has persistent diarrhea, will check C. difficile and other GI pathogens.  Therapeutics:  -Broad-spectrum antibiotics to cover intra-abdominal sources-Zosyn every 6  "hours  Type B lactic acidosis:  -Patient appears to have \"Lobular necrotic appearing lesion at the pancreatic head extending into the adjacent aspect of the liver\"-possible lactic acidosis is secondary to this.  Also noted to have \"Metastasis to the right lobe of the liver\". This is likely has bicarb is steady at 24 despite high lactic acidosis.  -Per ER discussion with [facility], images are being sent over via PACS.  Primary team to follow-up in AM  -Oncology consult in AM    2.  Concern for ischemic colitis:  -Patient presenting with abdominal pain and CT report shows \"marked mural thickening and edema involving the transverse and descending colon. Appearance nonspecific however given infectious and ischemic etiologies are considered\"  -Patient was seen and evaluated by the general surgery team.  Spoke with Dr. Joshi.  Currently patient is not a surgical candidate because of her severe liver dysfunction and stage IV malignancy.  According to them, at this stage, plan would be to continue with IV fluids, antibiotics and bowel rest with close medical monitoring.  -Team also recommends a discussion with palliative care.  -I discussed if we should proceed with a CTA to delineate her vessels better.  However, according to surgery, this would not change their ultimate surgical plan, and currently do not recommend a CTA. From a surgical standpoint, there is no contraindication to anticoagulation.  -I discussed with the hematology-oncology team.  Patient has a portal vein occlusion as noted on the CT abdomen pelvis.  However, she has thrombocytopenia mildly elevated INR and with her liver dysfunction would be at risk for bleeding. At this time, will defer heparin-see below, patient states her goal is to be able to return home to maximize time with family and prioritize comfort. Will need to proceed with the palliative, oncology, and surgical consults with a multidisciplinary care conference in AM. prior to proceeding " "with further anticoagulation.    3. Elevated bilirubin, mild transaminitis  - Possibly 2/2 liver dysfunction 2/2 malignancy vs obstructive. Less likely obstructive as no alk phos elevation.  -  Fractionated direct bili is 2.4. Given high risk for DIC, will check fibrinogen, INR, peripheral smear in AM  - Given liver dysfunction and NPO, hypoglycemia protocol and Q4H checks placed    4. Lethargy  - Patient was extremely lethargic per report at OSH and more so at arrival at [facility]. She improved over the course of 3 hours that I saw her. Possible this is 2/2 poor clearance of her Oxy that she took this AM.   - If she becomes lethargic overnight, will consider hepatic encephalopathy and give lactulose    5- Will need medication reconciliation to address Fluoxetine, Mirtazapine and Risperidone dose at home. Holding her hydrochlorothiazide as she is receiving IVF    Goals of care conference:  Had a detailed goals of care discussion with patient and catrina (fam member patient identified) via iPad. Discussed that her lactic acidosis, the fact that she is not a surgical candidate and her underlying liver dysfunction and stage IV disease are poor predictors. Patient stated that \" told her we can only treat you, we cannot heal you\". When asked what her end of life wishes would be, she stated she \"no resuscitation, no revival\". When asked specifically, she refused CPR and intubation.   - I asked her about wishes and her main goal is to go home and maximize time with her family. When asked if she decompensates, would you want to go the ICU to try and prolong your life or be kept comfortable even if that means you pass away quicker, she stated \"keep me comfortable\". Discussed maximizing medical supportive care in order to transition her home.  - She is afraid that dying will be painful and wants us to do our best to keep her pain free. Assured her that we will maximize comfort both inpatient and outpatient  - Family and " patient know we will have care conference in AM once we have more data of overnight vitals and labs. She is willing to discuss hospice, plan overnight is no escalation of care, if deteriorates, will call fiance asap.     Diet:   Regular  DVT Prophylaxis: Enoxaparin (Lovenox) SQ  Barrow Catheter: not present  Code Status:   DNR/DNI         Disposition Plan   Expected discharge: 2 - 3 days, recommended to prior living arrangement once Disposition is determined.  Entered: Alexys Leblanc MD 12/08/2020, 6:09 PM     The patient's care was discussed with the Bedside Nurse and Patient.    Alexys Leblanc MD  [facility]   Contact information available via [facility] Paging/Directory  Please see sign in/sign out for up to date coverage information    ______________________________________________________________________    Chief Complaint   Abdominal pain, constipation, difficulty voiding      History of Present Illness   Joesph Zeng is a 50 year old female PMHx of cholecystectomy, Hepatitis B, PTSD, and depression who presented as a transfer from [facility] with complaints of abdominal pain, constipation, lethargy, difficulty urinating for the past 4 days for a surgical c/s and ICU admission given ICUs were at capacity at various locations.    Per patient, she reports that her pain was localized to the pelvic suprapubic region as well as in the left lower quadrant and left side of her abdomen. Pain is intermittent, no radiation, no assc nausea or associated vomiting or diarrhea.  She does feel constipated. Her abdominal pain has since resolved since presenting to the emergency room.  She did have 1 loose watery bowel movement while in the ER.     Reports that her daughter was concerned about the way she looked and recommended that she come into the ER.     Otherwise, ROS, no hematemesis or urinary dysuria pain or hematuria. No chest pain, trouble breathing,  or cough, or fever or dyspnea.      Patient was quite sleepy, with a weak voice and lethargic on my initial visit, however improved in strength and was able to be more awake over the next 3 hours  Presented with Na of 131, Creat of 1.53, bicarb of 23 and LA of 8.4, VBG of 7.36/45/38 with INR of 1.9 and NH3 of 53 to OSH ER. Was given vanco and Zosyn and 1L fluids and transferred to [facility]    Review of Systems    The 10 point Review of Systems is negative other than noted in the HPI or here.     Past Medical History    I have reviewed this patient's medical history and updated it with pertinent information if needed.   Past Medical History:   Diagnosis Date     Allergic rhinitis        Past Surgical History   I have reviewed this patient's surgical history and updated it with pertinent information if needed.  Past Surgical History:   Procedure Laterality Date     CHOLECYSTECTOMY       ESOPHAGOSCOPY, GASTROSCOPY, DUODENOSCOPY (EGD), COMBINED N/A 8/3/2020    Procedure: ESOPHAGOGASTRODUODENOSCOPY, WITH BIOPSY;  Surgeon: Kendrick Santizo MD;  Location:  OR       Social History   I have reviewed this patient's social history and updated it with pertinent information if needed.  Social History     Tobacco Use     Smoking status: Never Smoker     Smokeless tobacco: Never Used   Substance Use Topics     Alcohol use: No     Drug use: No       Family History   I have reviewed this patient's family history and updated it with pertinent information if needed.  Family History   Problem Relation Age of Onset     Diabetes Mother      Hypertension Mother      Suicide Father 35       Prior to Admission Medications   Prior to Admission Medications   Prescriptions Last Dose Informant Patient Reported? Taking?   FLUoxetine 20 MG tablet   Yes No   Si mg 2 times daily   LORazepam (ATIVAN) 0.5 MG tablet   No No   Sig: Take 2 tablets (1 mg) by mouth every 4 hours as needed (Anxiety, Nausea/Vomiting or Sleep)   docusate sodium (COLACE)  100 MG capsule   No No   Sig: Take 1 capsule (100 mg) by mouth 2 times daily   Patient not taking: Reported on 2020   hydrochlorothiazide (HYDRODIURIL) 25 MG tablet   No No   Sig: Take 1 tablet (25 mg) by mouth daily   lidocaine-prilocaine (EMLA) 2.5-2.5 % external cream   No No   Sig: Apply topically as needed for moderate pain   Patient not taking: Reported on 2020   mirtazapine (REMERON) 15 MG tablet   Yes No   Sig: Take 15 mg by mouth   naloxone (NARCAN) 4 MG/0.1ML nasal spray   No No   Sig: Spray 1 spray (4 mg) into one nostril alternating nostrils once as needed for opioid reversal every 2-3 minutes until assistance arrives   Patient not taking: Reported on 2020   ondansetron (ZOFRAN) 8 MG tablet   No No   Sig: Take 1 tablet (8 mg) by mouth every 8 hours as needed (Nausea/Vomiting)   ondansetron (ZOFRAN) 8 MG tablet   No No   Sig: Take 1 tablet (8 mg) by mouth every 8 hours as needed for nausea   ondansetron (ZOFRAN-ODT) 4 MG ODT tab   No No   Sig: Take 1 tablet (4 mg) by mouth every 8 hours as needed for nausea or vomiting   Patient not taking: Reported on 2020   oxyCODONE IR (ROXICODONE) 10 MG tablet   No No   Sig: Take 1 tablet (10 mg) by mouth every 6 hours as needed for severe pain   polyethylene glycol (MIRALAX) powder   No No   Sig: Take 17 g (1 capful) by mouth daily   Patient not taking: Reported on 2020   prochlorperazine (COMPAZINE) 10 MG tablet   No No   Sig: Take 0.5 tablets (5 mg) by mouth every 6 hours as needed (Nausea/Vomiting)   prochlorperazine (COMPAZINE) 5 MG tablet   No No   Sig: Take 1 tablet (5 mg) by mouth every 6 hours as needed for nausea or vomiting   risperiDONE (RISPERDAL) 1 MG tablet   Yes No   Si mg once      Facility-Administered Medications: None     Allergies   No Known Allergies    Physical Exam   Vital Signs: Temp: 97.9  F (36.6  C) Temp src: Oral BP: 123/75 Pulse: 114   Resp: 20 SpO2: 98 % O2 Device: None (Room air)    Weight: 215 lbs 0  oz    General Appearance: Ill appearing, weak and slow to respond  Mental Status: Oriented to person, place, and time  HEENT: Jaundice + Pupils equal and reactive, extraocular eye movements intact. Mucous membranes moist, pharynx normal without lesions.   Chest: Clear to auscultation bilaterally, no wheezes, rales or rhonchi or crackels, symmetric air entry   Heart: Normal S1, S2. No murmurs, rubs, clicks or gallops. Normal rate, regular rhythm  Abdomen: Soft, nontender, nondistended, no masses or organomegaly, Bowel sounds heard  Neurological: Alert, oriented x 3, slow but discernable clear speech, CNS: CN 3-12 intact, Strength bilaterally intact in UE and LE 5/5  Skin and Extremities: Peripheral pulses normal, Pedal edema 1+, some bruising on the left shin      Data   Data reviewed today: I reviewed all medications, new labs and imaging results over the last 24 hours.

## 2020-12-09 NOTE — PROGRESS NOTES
Antimicrobial Stewardship Team Note    Antimicrobial Stewardship Program - A joint venture between [facility] and  Physicians to optimize antibiotic management.  NOT a formal consult - Restricted Antimicrobial Review     Patient: Joesph Zeng  MRN: [id number]  Allergies: Patient has no known allergies.    Brief Summary: Joesph Zeng is a 50 year old female with PMH of cholangeocarcinoma on chemotherapy (every other week, last round 12/4), cholecystectomy, hepatitis B, PTSD, and depression.     HPI: Patient presented to the ED from [facility] on 12/8 for evaluation of abnormal labs and CT. Per medical record, patient presented with abdominal pain, grogginess, difficulty urinating, and constipation. She reported these symptoms started over the weekend. Patient's labs at [facility] showed ammonia 58, lactic acid 8.4, AST 63, bilirubin 5.2, and plts 92. Abdomial CT showed necrotic lesion on pancreatic head extending into the liver, necrotic lesion on the duodenum and pancreas, metastasis of the right lobe of liver, portal vein and portal confluence occlusion, extrinsic compression of the inferior vena cava, ascities, and thickening and edema of the colon. Patient tachycardic, jaundiced at admission. Concern for ischemic bowel. Patient started on vancomycin and Zosyn. Vancomycin discontinued prior to patient receiving any doses. Repeat labs showed downtrending lactic acid 5.9. Per surgery, patient not a candidate for surgery since resection of tumor is unlikely to improve compression. Blood culture from 12/8 at OSH and her positive for GNR identified as E. coli with no resistance genes detected on Verigene.         Active Anti-infective Medications   (From admission, onward)                 Start     Stop    12/08/20 1900  piperacillin-tazobactam  3.375 g,   Intravenous,   EVERY 6 HOURS     Intra-Abdominal Infection        --                  Assessment: E. coli Bacteremia   Patient  presented with worsening liver function, multiple intra-abdominal lesions and ischemic bowel found to have E. coli bacteremia. While ports can be colonized with bacteria, E. coli almost always represents a true bacteremia.1 Source of bacteremia likely 2/2 IAI translocation given CT findings and clinical presentation. Patient is currently on Zosyn. Given that this patient has no evidence of Pseudomonas, recommend de-escalating therapy to ceftriaxone and Flagyl for narrower and targeted coverage of E. coli from IAI source. Flagyl can be administered orally given its excellent bioavailability when patient is able to tolerate. Consider oral step down to ciprofloxacin if E. coli susceptible given their excellent oral bioavailability and tissue penetration.    Recommendations:  Discontinue Zosyn  Start ceftriaxone 2g every daily and Flagyl 500 mg BID    Sources:  Khoa KK, Morgan JA. 2006. Updated Review of Blood Culture Contamination. Clin Microbiol Rev. 2006;19:788-802    Discussed with ID Staff: Kd Bone MD and Tammy Newby, PharmD, BCIDP  Aliyah WootenD candidate 2021  [facility] Pager: [phone]     Vital Signs/Clinical Features:  Vitals         12/07 0700  -  12/08 0659 12/08 0700  -  12/09 0659 12/09 0700  -  12/09 1447   Most Recent    Temp ( F)   97.9 -  99.8    97.9 -  98.2     97.9 (36.6)    Pulse   110 -  123      108     108    Resp   16 -  20    12 -  18     18    BP   109/68 -  138/78      110/91     110/91    SpO2 (%)   96 -  100      96     96            Labs  Estimated Creatinine Clearance: 128.4 mL/min (based on SCr of 0.63 mg/dL).  Recent Labs   Lab Test 10/23/20  0749 11/05/20  1220 11/11/20  1335 12/04/20  0910 12/08/20  1555 12/09/20  0419   CR 0.58 0.66 0.74 0.68 0.80 0.63       Recent Labs   Lab Test 10/08/20  1140 10/23/20  0749 11/05/20  1220 11/11/20  1335 12/04/20  0910 12/08/20  1555 12/09/20  0419   WBC 3.5* 4.2 3.8* 3.4* 5.4 5.2 6.7   ANEU 2.0 2.6 2.4 2.1 3.3 4.7  --    ALYM 1.1 1.0 0.9  0.9 1.2 0.3*  --    OLIVIA 0.4 0.4 0.4 0.4 0.8 0.2  --    AEOS 0.1 0.1 0.1 0.0 0.1 0.0  --    HGB 13.2 12.1 11.5* 12.0 12.0 12.1 10.7*   HCT 40.9 36.1 34.4* 35.1 35.8 35.8 31.5*   MCV 94 94 96 96 101* 102* 102*   * 80* 50* 99* 91* 75* 55*       Recent Labs   Lab Test 10/23/20  0749 11/05/20  1220 11/11/20  1335 12/04/20  0910 12/08/20  1555 12/09/20  0419   BILITOTAL 2.0* 1.2 1.4* 1.9* 4.6* 3.2*   ALKPHOS 167* 225* 124 154* 88 69   ALBUMIN 2.3* 2.2* 2.2* 2.1* 1.9* 1.7*   * 72* 53* 61* 57* 41   * 68* 38 33 54* 43       Recent Labs   Lab Test 12/08/20  1555 12/08/20  1816 12/08/20  2320 12/09/20  0419 12/09/20  0552 12/09/20  1359   PCAL  --   --   --  7.52*  --   --    LACT 5.9* 5.9* 3.9*  --  2.8* 2.4*             Culture Results:  7-Day Micro Results       Procedure Component Value Units Date/Time    Blood culture [] Collected: 12/09/20 0458    Order Status: Completed Lab Status: Preliminary result Updated: 12/09/20 0702    Specimen: Blood      Specimen Description Blood Right Hand     Culture Micro No growth after 1 hour    Blood culture [] Collected: 12/09/20 0454    Order Status: Completed Lab Status: Preliminary result Updated: 12/09/20 0702    Specimen: Blood      Specimen Description Blood Portacath     Culture Micro No growth after 1 hour    Blood Culture ONE site [[id number]]  (Abnormal) Collected: 12/08/20 1555    Order Status: Completed Lab Status: Preliminary result Updated: 12/09/20 0804    Specimen: Blood from Portacath      Specimen Description Blood Portacath     Culture Micro Cultured on the 1st day of incubation:  Gram negative rods        Critical Value/Significant Value, preliminary result only, called to and read back by  She Mueller RN at 0538 12/9/20 hg        (Note)  POSITIVE for E.COLI by FanLibigene multiplex nucleic acid test. Final  identification and antimicrobial susceptibility testing will be  verified by standard methods. Verigene test will not  distinguish  E.coli from Shigella species including S.dysenteriae, S.flexneri,  S.boydii, and S.sonnei. Specimens containing Shigella species or  E.coli will be reported as Positive for E.coli.    Specimen tested with Verigene multiplex, gram-negative blood culture  nucleic acid test for the following targets: Acinetobacter sp.,  Citrobacter sp., Enterobacter sp., Proteus sp., E. coli, K.  pneumoniae/oxytoca, P. aeruginosa, and the following resistance  markers: CTXM, KPC, NDM, VIM, IMP and OXA.    Critical Value/Significant Value called to and read back by RONEY ALVAREZ RN 12/9/20 0803 MK               Recent Labs   Lab Test 03/05/20  1117 12/08/20  1909   URINEPH 6.0 6.5   NITRITE Negative Negative   LEUKEST Small* Negative   WBCU 5-10* 2                         Imaging: Xr Chest Port 1 View    Result Date: 12/8/2020  EXAM: XR CHEST PORT 1 VW LOCATION: [facility] DATE/TIME: 12/8/2020 6:41 PM INDICATION: Sepsis COMPARISON: CT chest 11/09/2020     IMPRESSION: Right IJ Port-A-Cath terminates at the cavoatrial junction. Normal heart size. No pleural effusion or pneumothorax. Low lung volumes but no evidence for CHF or pneumonia.    Poc Us Abdomen Limited    Limited Bedside Cardiac Ultrasound, performed and interpreted by me. Indication: Weakness. Parasternal long axis, parasternal short axis and apical 4 chamber views were acquired. Image quality was not satisfactory though was able to obtain a parasternal long axis view. Findings:  Global left ventricular function appears intact. Chambers do not appear dilated. There is no evidence of free fluid within the pericardium. IMPRESSION: Grossly normal left ventricular function and chamber size.  No pericardial effusion.

## 2020-12-10 NOTE — PROGRESS NOTES
[facility]     Medicine Progress Note - Hospitalist Service, Gold 11       Date of Admission:  12/8/2020  Assessment & Plan       Joesph Zeng is a 50 year old female with intrahepatic cholangiocarcinoma stage Marybeth  diagnosed in Oct 2019 on pallaitive gemzar/cisplatin since Aug 2020, last visit with  on Dec 04 2020 and started cycle 4 Day 1 that day,  who is presenting with abdominal pain with elevated lactic acid of 5.9 which is stable after fluid resucitation, CT abdomen pelvis that shows a necrotic pancreatic mass abutting on the duodenum third portion and portal vein occlusion and ischemic colitis in the transverse and descending colon.     # E.coli bacteremia  # Lactic acidosis  # Concern for infectious vs ischemic colitis vs Type B lactic acidosis from malignancy vs poor liver dysfunction  # Elevated bilirubin, mild transaminitis  # Acute encephalopathy related to sepsis, opioids, severe illness  CT showed:  1. lobular necrotic appearing lesion at the pancreatic head extending into the adjacent aspect of the liver. This measures up to 7.7 cm.    2.  Large necrotic retroperitoneal lesion abutting the third portion of the duodenum and inferior to the uncinate process of the pancreas. No pancreatic duct dilatation.    3.  Metastasis to the right lobe of the liver.    4.  Portal vein and portal confluence occlusion. The SMV and splenic vein appear patent. There are marked epigastric and retroperitoneal collateral vessels.    5.  Extrinsic compression on the inferior vena cava.    6.  Ascites.    7.  There is marked mural thickening and edema involving the transverse and descending colon. Appearance nonspecific however given infectious and ischemic etiologies are considered.    Blood culture positive for E.coli, started on Zosyn at OSH and did receive a dose of vancomycin as well. Surgery had been consulted, deemed not a good surgical candidate given prognosis,  "her severe liver dysfunction, and stage IV malignancy.  According to them, at this stage, plan would be to continue with IV fluids, antibiotics and bowel rest with close medical monitoring.  - discussed with family and patient - pursuing comfort cares, will stop antibiotics and labs  - palliative care involved closely, plan for comfort cares here and nursing supervisor working on allowing family to visit         Goals of care conference on admission:  \"Had a detailed goals of care discussion with patient and catrina (fam member patient identified) via iPad. Discussed that her lactic acidosis, the fact that she is not a surgical candidate and her underlying liver dysfunction and stage IV disease are poor predictors. Patient stated that \" told her we can only treat you, we cannot heal you\". When asked what her end of life wishes would be, she stated she \"no resuscitation, no revival\". When asked specifically, she refused CPR and intubation.   - I asked her about wishes and her main goal is to go home and maximize time with her family. When asked if she decompensates, would you want to go the ICU to try and prolong your life or be kept comfortable even if that means you pass away quicker, she stated \"keep me comfortable\". Discussed maximizing medical supportive care in order to transition her home.  - She is afraid that dying will be painful and wants us to do our best to keep her pain free. Assured her that we will maximize comfort both inpatient and outpatient  - Family and patient know we will have care conference in AM once we have more data of overnight vitals and labs. She is willing to discuss hospice, plan overnight is no escalation of care, if deteriorates, will call fiance asap.\"    I also talked to daughter and patient's catrinae. Agree with [facility] and respecting her wishes, hope to visit her tonight. I did tell them that she may pass over the next 1-2 days, and I would not be surprised if she passed " tonight and so very important for family to visit tonight as able.        Diet: Regular Diet Adult    DVT Prophylaxis: none  Barrow Catheter: in place, indication: Strict 1-2 Hour I&O  Code Status: No CPR- Do NOT Intubate           Disposition Plan   Expected discharge: comfort cares.  Entered: Adalgisa Pickett 12/09/2020, 6:03 PM       The patient's care was discussed with the Bedside Nurse, Patient and Patient's Family and palliative care.    Adalgisa Pickett MD (Sally)  Internal Medicine/Pediatrics  Hospitalist    Hospitalist Service, [address]   Contact information available via [facility] Paging/Directory  Please see sign in/sign out for up to date coverage information  ______________________________________________________________________    Interval History   Admitted overnight. This morning, was tired, c/o abdominal pain. She made it very clear she wished to pursue hospice care - has been thinking about it for the last few days when she has been feeling sicker.     Data reviewed today: I reviewed all medications, new labs and imaging results over the last 24 hours. I personally reviewed no images or EKG's today.    Physical Exam   Vital Signs: Temp: 98.1  F (36.7  C) Temp src: Oral BP: 112/49 Pulse: 102   Resp: 18 SpO2: 97 % O2 Device: None (Room air)    Weight: 229 lbs 11.51 oz    Physical Exam    General: awake, but does seem tired and uncomfortable  HEENT: NCAT, EOMI, sclera mildly icteric, no nasal discharge  CV: tachycardic, no murmurs noted  Resp: CTAB, no increased WOB  Abd: Soft, tender, distended  MSK: No peripheral edema  Skin: warm, dry  Neuro: lethargic but answering all questions appropriately      Data    Results for orders placed or performed during the hospital encounter of 12/08/20 (from the past 24 hour(s))   Lactic acid   Result Value Ref Range    Lactic Acid 5.9 (HH) 0.7 - 2.0 mmol/L   EKG 12-lead, tracing only   Result Value Ref Range     Interpretation ECG Click View Image link to view waveform and result    UA with Microscopic reflex to Culture    Specimen: Urine catheter; Catheterized Urine   Result Value Ref Range    Color Urine Yellow     Appearance Urine Clear     Glucose Urine Negative NEG^Negative mg/dL    Bilirubin Urine Negative NEG^Negative    Ketones Urine Negative NEG^Negative mg/dL    Specific Gravity Urine 1.020 1.003 - 1.035    Blood Urine Moderate (A) NEG^Negative    pH Urine 6.5 5.0 - 7.0 pH    Protein Albumin Urine Negative NEG^Negative mg/dL    Urobilinogen mg/dL Normal 0.0 - 2.0 mg/dL    Nitrite Urine Negative NEG^Negative    Leukocyte Esterase Urine Negative NEG^Negative    Source Catheterized Urine     WBC Urine 2 0 - 5 /HPF    RBC Urine 30 (H) 0 - 2 /HPF    Squamous Epithelial /HPF Urine 2 (H) 0 - 1 /HPF    Transitional Epi <1 0 - 1 /HPF    Mucous Urine Present (A) NEG^Negative /LPF   XR Chest Port 1 View    Narrative    EXAM: XR CHEST PORT 1 VW  LOCATION: [facility]  DATE/TIME: 12/8/2020 6:41 PM    INDICATION: Sepsis  COMPARISON: CT chest 11/09/2020      Impression    IMPRESSION: Right IJ Port-A-Cath terminates at the cavoatrial junction. Normal heart size. No pleural effusion or pneumothorax. Low lung volumes but no evidence for CHF or pneumonia.   Lactic acid whole blood   Result Value Ref Range    Lactic Acid 3.9 (H) 0.7 - 2.0 mmol/L   Palliative Care Adult IP Consult: Patient to be seen: Routine within 24 hrs; Call back #: Pls check AM team assignment and page accordingly; End of life/Palliative care; Consultant may enter orders: Yes; Requesting provider? Hospitalist (if differ...    Narrative    Jennifer Beckett, CRISTINE CNS     12/9/2020  5:10 PM  [facility]  Palliative Care Consultation Note    Patient: Joesph Zeng  Date of Admission:  12/8/2020    Requesting Clinician / Team: Alexys Leblanc MD  Reason for consult: Goals of care  Decisional  support    Recommendations:    DNR/DNI, goals are comfort focused overall    Discontinue labs, monitor, and routine vital signs    Discontinue antibiotics    Entered comfort measures orders    - if only moderate benefit from max dose hydromorphone or   lorazepam would do 50% dose increase  - if minimal to no benefit from max dose hydromorphone or   lorazepam would do 100% dose increase    These recommendations have been discussed with primary team.      Thank you for the opportunity to participate in the care of   this patient and family. Our team: will continue to follow.     During regular M-F work hours -- if you are not sure who   specifically to contact -- please contact us by sending a text   page to our team consult pager at [phone].    After regular work hours and on weekends/holidays, you can call   our answering service at [phone]. Also, who's on call for us   is available in [employer].       Assessments:  Joesph Zeng is a 50 year old female with a past medical history of   a recently diagnosed intrahepatic cholangiocarcinoma (October 2019) and received palliative gemzar/cisplatin since August 2020   and last dose was December 4 2002. She presented with abdominal   pain and an elevated lactic acid which stabilized after fluids.   She had a abdominal CT which showed a necrotic pancreatic mass   abutting on the duodenum third portion and portal vein occlusion   and ischemic colitis in the transverse and descending colon. She   was evaluated by surgery and is not a surgical candidate given   her liver dysfunction and malignancy.     Her labs seem to be improving so there is ongoing consideration   of continued antibiotics. Also consideration of a diagnostic   paracentesis.     Yesterday she and her fiance had a discussion with her doctor   about goals of care, and determined she wishes to be DNR/DNI and   if she decompensates she wished to be kept comfortable and die in   the hospital.      Today, the patient was seen for:  Intrahepatic cholangiocarcinoma stage Marybeth with apparent carolann   involvement on CT    Prognosis, Goals, & Planning:      Functional Status just prior to hospitalization: 2 (Ambulatory   and capable of all selfcare but unable to carry out any work   activities; may need help with IADLs up and about > 50% of waking   hours)    3 (Capable of only limited self-care; needs help with ADLs; in   bed/chair >50% of waking hours)      Prognosis, Goals, and/or Advance Care Planning were addressed   today: Yes        Summary/Comments: met with patient today, discussed her   understanding of her illness. She has good understanding and she   wants to focus on hospice care and also wants to die in the   hospital. She also understands that time is very short. Discussed   with her how she has made this decision and she says that she has   been thinking about this over the last few days and she has   noticed a decline in her health. She feels that her priorities at   this time are to make sure she has no pain and dies comfortably.   She also finds it very important that she sees her children and   completes a health care directive if able. Discussed the   importance of her being awake and alert to finish her health care   directive and visit with her children. And also discussed the   difficulty of having that be possible and treating her pain so   she does not feel it at all. She ultimately feels that completing   her HCD and seeing her family is more important at this time.     At this time we also discussed the possibility that she is in the   dying process and declines overnight, as I am concerned this very   likely could happen. Discussed if that is the case we would   increase our aggressiveness of her symptom management at that   time.     Returned for a visit with patient, discussed concern that she   could decline rapidly overnight, she wishes to stop all life   prolonging measures and  aggressively       Patient's decision making preferences: independently          Patient has decision-making capacity today for complex   decisions: Yes            I have concerns about the patient/family's health literacy   today: No           Patient has a completed Health Care Directive: No.       Code status: No CPR / No Intubation    Coping, Meaning, & Spirituality:   Mood, coping, and/or meaning in the context of serious illness   were addressed today: Yes  Summary/Comments: coping appropriately at this time    Social:     Living situation: lives with boyfriend and children    Key family / caregivers: 5 children (2 10 yo twins, 13 yo, 19 yo, and 29 yo), boyfriend     History of Present Illness:  History gathered today from: medical chart    Joesph Zeng is a 50 year old female with a past medical history of   a recently diagnosed intrahepatic cholangiocarcinoma (October 2019) and received palliative gemzar/cisplatin since August 2020   and last dose was December 4 2002. She presented with abdominal   pain and an elevated lactic acid which stabilized after fluids.   She had a abdominal CT which showed a necrotic pancreatic mass   abutting on the duodenum third portion and portal vein occlusion   and ischemic colitis in the transverse and descending colon. She   was evaluated by surgery and is not a surgical candidate given   her liver dysfunction and malignancy.     Her labs seem to be improving so there is ongoing consideration   of continued antibiotics. Also consideration of a diagnostic   paracentesis.     Yesterday she and her fiance had a discussion with her doctor   about goals of care, and determined she wishes to be DNR/DNI and   if she decompensates she wished to be kept comfortable and die in   the hospital.     Palliative care consulted 12/9 for goals of care    Key Palliative Symptom Data:  # Pain severity the last 12 hours: severe  # Dyspnea severity the last 12 hours: severe  # Nausea severity  the last 12 hours: moderate  # Anxiety severity the last 12 hours: low        ROS:  Comprehensive ROS is reviewed and is negative except as here &   per HPI:      Past Medical History:  Past Medical History:   Diagnosis Date     Allergic rhinitis         Past Surgical History:  Past Surgical History:   Procedure Laterality Date     CHOLECYSTECTOMY  2001     ESOPHAGOSCOPY, GASTROSCOPY, DUODENOSCOPY (EGD), COMBINED N/A   8/3/2020    Procedure: ESOPHAGOGASTRODUODENOSCOPY, WITH BIOPSY;  Surgeon:   Kendrick Santizo MD;  Location:  OR         Family History:  Family History   Problem Relation Age of Onset     Diabetes Mother      Hypertension Mother      Suicide Father 35         Allergies:  No Known Allergies     Medications:  I have reviewed this patient's medication profile and medications   from this hospitalization.   Noted:  Fluoxetine 20 mg BID  miralax daily  Senna 1- 2 tablets BID  Hydromorphone 0.2-0.3 mg q2h PRN- x1  Ondansetron PRN- x1      Physical Exam:  Vital Signs: Temp: 98.2  F (36.8  C) Temp src: Axillary BP:   119/69 Pulse: 108   Resp: 12 SpO2: 96 % O2 Device: None (Room   air)    Weight: 229 lbs 11.51 oz    Constitutional: Awake, lethargic, cooperative, no apparent   distress  ENT: Normocephalic, without obvious abnormality, atramatic  Lungs: No increased work of breathing, good air exchange  Cardiovascular: tachycardic, normal S1 and S2  Musculoskeletal: No redness, warmth, or swelling of the joints.   Neurologic: Awake, lethargic, oriented to name, place and time.    Neuropsychiatric: Normal affect, mood, orientation, memory and   insight. Slow to answer questions however does answer   appropriately.   Skin: No rashes, erythema, pallor, petechia or purpura.      Data reviewed:  Recent imaging reviewed, my comments on pertinents:   US abdomen 12/8  Findings:     Global left ventricular function appears intact.   Chambers do not appear dilated.   There is no evidence of free fluid within the  pericardium.     Recent lab data reviewed, my comments on pertinents:   Sodium 134  Potassium 3.1  Creatinine 0.63  GFR > 90  Lactic acid 2.8  WBC 6.7  Hemoglobin 10.7  Platelets 55    CRISTINE Campbell CNS  Palliative Care Consult Team  Pager: [phone]    90 minutes spent. This includes 60 minutes face to face with   patient discussing current complex health conditions, prognosis,   goals of care, and symptom management. Coordination of care with   the primary team, palliative SW, and unit SW regarding goals of   care, prognosis, psychosocial support, and symptom management.    CBC with platelets   Result Value Ref Range    WBC 6.7 4.0 - 11.0 10e9/L    RBC Count 3.08 (L) 3.8 - 5.2 10e12/L    Hemoglobin 10.7 (L) 11.7 - 15.7 g/dL    Hematocrit 31.5 (L) 35.0 - 47.0 %     (H) 78 - 100 fl    MCH 34.7 (H) 26.5 - 33.0 pg    MCHC 34.0 31.5 - 36.5 g/dL    RDW 16.7 (H) 10.0 - 15.0 %    Platelet Count 55 (L) 150 - 450 10e9/L   Comprehensive metabolic panel   Result Value Ref Range    Sodium 134 133 - 144 mmol/L    Potassium 3.1 (L) 3.4 - 5.3 mmol/L    Chloride 99 94 - 109 mmol/L    Carbon Dioxide 29 20 - 32 mmol/L    Anion Gap 6 3 - 14 mmol/L    Glucose 142 (H) 70 - 99 mg/dL    Urea Nitrogen 24 7 - 30 mg/dL    Creatinine 0.63 0.52 - 1.04 mg/dL    GFR Estimate >90 >60 mL/min/[1.73_m2]    GFR Estimate If Black >90 >60 mL/min/[1.73_m2]    Calcium 8.1 (L) 8.5 - 10.1 mg/dL    Bilirubin Total 3.2 (H) 0.2 - 1.3 mg/dL    Albumin 1.7 (L) 3.4 - 5.0 g/dL    Protein Total 7.0 6.8 - 8.8 g/dL    Alkaline Phosphatase 69 40 - 150 U/L    ALT 43 0 - 50 U/L    AST 41 0 - 45 U/L   Magnesium   Result Value Ref Range    Magnesium 1.7 1.6 - 2.3 mg/dL   Fibrinogen activity   Result Value Ref Range    Fibrinogen 270 200 - 420 mg/dL   INR   Result Value Ref Range    INR 1.84 (H) 0.86 - 1.14   Procalcitonin   Result Value Ref Range    Procalcitonin 7.52 (HH) ng/ml   Blood culture    Specimen: Blood    Portacath   Result Value Ref Range     Specimen Description Blood Portacath     Culture Micro No growth after 9 hours    Blood culture    Specimen: Blood    Right Hand   Result Value Ref Range    Specimen Description Blood Right Hand     Culture Micro No growth after 9 hours    Lactic acid whole blood   Result Value Ref Range    Lactic Acid 2.8 (H) 0.7 - 2.0 mmol/L   Glucose by meter   Result Value Ref Range    Glucose 138 (H) 70 - 99 mg/dL   Lactic acid whole blood   Result Value Ref Range    Lactic Acid 2.4 (H) 0.7 - 2.0 mmol/L   Advance Directive IP Consult    Narrative    Michelle Roque MSW     12/9/2020  3:37 PM  BRIEF SOCIAL WORK NOTE:    SW consulted for HCD. SW coordinated with Palliative SW (Wendi)   and Palliative APRN (Jennifer) today. Palliative SW is already   assisting in completion of HCD; SW will close consult. Per update   from Palliative Team, Pt will likely transition to full comfort   cares tomorrow and it is anticipated she will pass in the   hospital. No discharge planning needs.    SW remains available as needed during this hospitalization.     ROC Salgado, LICSW  6B Intermediate Care Unit   [facility]  Phone: [phone]  Pager: [phone]         Medications     - MEDICATION INSTRUCTIONS -         FLUoxetine  20 mg Oral BID     sodium chloride (PF)  3 mL Intracatheter Q8H

## 2020-12-10 NOTE — PROGRESS NOTES
PALLIATIVE CARE SOCIAL WORK Progress Note   [facility] ([place] Unit 6B    Follow Up    PCSW was updated this morning that Joesph does appear appropriate to sign her HCD. PCSW scheduled time with notary for 11am. PCSW met with Joesph and Doc. Joesph was alert & orientated & able to explain decisions she made last night (she was consistent). Joesph signed document with the notary (as of 11:45am it is uploaded into her EMR).     While we were talking about completing her HCD Joseph and Doc discussed her change of wishes to go home on hospice. Discussed that it will take a little bit of time to make sure her medications are working in a way that can be used at home. Doc and Joesph understood.     PCSW asked unit SW to follow up with referral to home hospice agency.    Plan: PCSW will continue to be available for support while Palliative Care Team is following.    MARYSOL Bravo  Palliative Care   Pager [phone]    [facility] Inpatient Team Consult pager [phone] (M-F 8-4:30)  After-hours Answering Service [phone]

## 2020-12-10 NOTE — PROGRESS NOTES
PALLIATIVE CARE SOCIAL WORK Progress Note   [facility] ([place] Unit 6B    REFERRAL SOURCE: Palliative Care Team    Joint visit with Joesph and Palliative Care CNS.   Joesph was able to talk about her wishes to be comfortable and she feels that her time is limited. She finds it important the time she completed her HCD naming her SO (Doc Beach) as her medical decision maker. She was alert and orientated at the time that PCSW assisted her in completing the document. She does feel that her family all know her wishes and want to honor these choices. She understands that the notary won't be available until tomorrow and if she isn't alert enough we won't be able to notarize the document.     After a second decision Joesph was able to say that her comfort is most important. She also really wants her family to be able to visit. She has already prepared them that she might be sleeping when they come visit. PCSW and Palliative Care CNS worked with Charge RN and ANS to request her children (including her twin 10 year old sons & 12 year old daughter) to visit. Nursing administration did not approve her minor children to visit. PCSW called Doc to let him know of this decision. He was understanding and reports that he and 3 other people will be coming to visit tonight. He asked if her brother and sister-in-law could visit tomorrow morning. PCSW asked him to call in the morning to talk with charge nurse as those decision can't be made at this time.    Plan: PCSW will follow up tomorrow morning to see if Joesph is appropriate to sign HCD.     Wendi Hoang [facility]  Palliative Care   Pager [phone]    [facility] Inpatient Team Consult pager [phone] (M-F 8-4:30)  After-hours Answering Service [phone]

## 2020-12-10 NOTE — CONSULTS
Care Management Initial Consult    General Information  Assessment completed with: Patient;Family, Patient (Joesph), sister and sister in law  Type of CM/SW Visit: Initial Assessment  Primary Care Provider verified and updated as needed: No   Readmission within the last 30 days: no previous admission in last 30 days      Reason for Consult: discharge planning;end of life/hospice  Advance Care Planning: Advance Care Planning Reviewed: present on chart   Pt completed HCD today naming her boyfriend Doc Beach as HCA. HCD has been notarized and is uploaded to Pt's electronic record     Communication Assessment  Patient's communication style: spoken language (English or Bilingual)    Hearing Difficulty or Deaf: no   Wear Glasses or Blind: no    Cognitive  Cognitive/Neuro/Behavioral: .WDL except  Level of Consciousness: lethargic  Arousal Level: opens eyes spontaneously  Orientation: oriented x 4  Mood/Behavior: hypoactive (quiet, withdrawn)  Best Language: 1 - Mild to moderate  Speech: whispers    Living Environment:   People in home: child(rohan), adult;child(rohan), dependent;significant other  Boyfriend (Doc); has 5 children - two twin 10 year olds, a 12 year old, an 18 year old and a 30 year old  Current living Arrangements: house      Able to return to prior arrangements: yes  Living Arrangement Comments: Pt lives with family in a home with 1 SANDRA. Family will provide 24/7 caregiving assist upon d/c with addition of home hospice    Family/Social Support:  Care provided by: spouse/significant other;child(rohan);other (see comments)(Siblings)  Provides care for: child(rohan)  Marital Status: Lives with Significant Other  Significant Other;Children;Sibling(s)       Doc  Description of Support System: Supportive;Involved    Support Assessment: Adequate family and caregiver support;Adequate social supports    Current Resources:   Skilled Home Care Services:  None  Community Resources:  None  Equipment currently used at home:  none  Supplies currently used at home:  None    Employment/Financial:  Employment Status:   Did not discuss     Financial Concerns: No concerns identified   Referral to Financial Counselor: No     Lifestyle & Psychosocial Needs:     Socioeconomic History     Marital status: Single     Spouse name: Not on file     Number of children: Not on file     Years of education: Not on file     Highest education level: Not on file     Tobacco Use     Smoking status: Never Smoker     Smokeless tobacco: Never Used   Substance and Sexual Activity     Alcohol use: No     Drug use: No     Sexual activity: Yes     Partners: Male     Mental Health Status:  Mental Health Status: No Current Concerns       Chemical Dependency Status:  Chemical Dependency Status: No Current Concerns           Values/Beliefs:  Spiritual, Cultural Beliefs, Restorationism Practices, Values that affect care:  Did not discuss    Discharge Planning:  Length of Stay (days): 2  Expected Discharge Date: Tomorrow - 12/11/20  Expected Time of Departure: 1230 via [employer] (Ph: [phone]) - stretch  Concerns to be Addressed: discharge planning     Patient plan of care discussed at interdisciplinary rounds: Yes    Anticipated Discharge Disposition: Home;Hospice  Disposition Comments: Pt and family are understanding and in agreement with hospice discharge plan.  Anticipated Discharge Services: None  Anticipated Discharge DME: Other (see comment)(Hospital Bed)    Education Provided on the Discharge Plan:  yes  Patient/Family in Agreement with the Plan: yes    Referrals Placed by CM/SW: Hospice  Private pay costs discussed: Not applicable    Additional Information:  ALFREDITO was updated by Palliative Care SW Sathya) that Pt/family are wanting to pursue a discharge home on hospice now. Per MD, Pt is stable for a transfer home.     SW met with Pt and sister/sister in law at bedside today to introduce self, SW role and discuss discharge  planning/coordination of hospice services. Pt confirmed that she would like to discharge home as soon as hospice services can be arranged. ALFREDITO discussed hospice team, medications, equipment and coverage with Pt/family. ALFREDITO discussed hospice agency choice and Pt would prefer [facility] if they have availability for SOC tomorrow, otherwise would prefer a different agency and did not have preference.    ALFREDITO confirmed that Pt will have a variety of family members providing 24/7 care at home. They report no concerns with day to day caregiving and understand that hospice provides supplemental support.    ALFREDITO coordinated with [facility] and unfortunately they do not have any SOC openings until Saturday. Due to Pt's desire to discharge as soon as possible SW did initiate referral to [facility] and they are available for a SOC tomorrow. ALFREDITO spoke with Krysten (Ph: [phone], F: [phone]) in Intake and provided needed information. ALFREDITO faxed referral. Krysten confirms that they have a SOC available tomorrow 12/11/2020 @ 1300. ALFREDITO informed Krysten about Pt's need for hospital bed prior to discharge and Krysten confirms that they will order this and have it delivered to Pt's home tonight so that it will be there prior to her discharge tomorrow.     ALFREDITO spoke with Pt/family about discharge transportation. ALFREDITO explained that medical transport may or may not be covered by Pt's insurance and they expressed understanding. ALFREDITO arranged stretcher ride through [facility] (Ph: [phone]) for p/u tomorrow at 1230.     ALFREDITO reviewed discharge information with Pt and her sister. Sister confirmed that she will pass along all information to Pt's significant other, Doc.     ALFREDITO paged  Dr. Pickett with discharge information/time. ALFREDITO requested orders be sent to [facility] to fill a couple days worth of meds to ensure there is no gap in medication upon discharge.    ALFREDITO will fax discharge orders to [facility] tomorrow. SW to continue to follow and assist with discharge plan and coordination of hospice services.      ROC Salgado, [facility] HospitalSW  6B Intermediate Care Unit   SHARATH Pearce  Phone: [phone]  Pager: [phone]

## 2020-12-10 NOTE — PROGRESS NOTES
[facility]  Palliative Care Daily Progress Note       Recommendations & Counseling       DNR/DNI, comfort measures only     Stopped IV medications in anticipation that plan is to discharge home tomorrow, want to ensure adequate symptom management on orals/SL route prior to discharge. If she does develop worsening symptoms and needs better symptom control please reorder IV medications at that time.    If symptoms worsen: if only moderate benefit from max dose hydromorphone or lorazepam would do 50% dose increase. 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 She presented with abdominal pain and an elevated lactic acid which stabilized after fluids. She had a abdominal CT which showed a necrotic pancreatic mass abutting on the duodenum third portion and portal vein occlusion and ischemic colitis in the transverse and descending colon. She was evaluated by surgery and is not a surgical candidate given her liver dysfunction and malignancy.      Her labs seem to be improving so there is ongoing consideration of continued antibiotics. Also consideration of a diagnostic paracentesis.      Yesterday she and her fiance had a discussion with her doctor about goals of care, and determined she wishes to be DNR/DNI and if she decompensates she wished to be kept comfortable and die in the hospital.     Today, the patient was seen for:  Intrahepatic cholangiocarcinoma stage Marybeth  Bacteremia  Severe  pain  Nausea    Prognosis, Goals, or Advance Care Planning was addressed today with: Yes.    Comfort measures only, DNR/DNI. Hoping to discharge home. Completed POLST today with pt and sister present.    Patient does express concern about being left at home alone if boyfriend has to work. She does say that her boyfriend is taking of work currently to be with her. Reviewed with her and sister recommendation for her to be not left alone ever, and reviewed if things are difficult at home to reach out to hospice and discuss options.    Mood, coping, and/or meaning in the context of serious illness were addressed today: Yes.  Summary/Comments: coping appropriately at this time.            Interval History:     Chart review/discussion with unit or clinical team members:   Notes reviewed, no acute events.     Per patient or family/caregivers today:  Patient reports improved pain control, slept well last night.     Key Palliative Symptoms:  # Pain severity the last 12 hours: low  # Dyspnea severity the last 12 hours: none  # Nausea severity the last 12 hours: moderate  # Anxiety severity the last 12 hours: not assessed               Review of Systems:     Besides above, ROS was reviewed and is unremarkable          Medications:     I have reviewed this patient's medication profile and medications during this hospitalization.    Noted meds:    Paxil  Hydromorphone 4-8 mg q2h PRN- x1 (4 mg given)  Hydromorphone 0.3-0.5 mg q2h PRN- x8 (2.8 mg over last 24 hours)  Ondansetron PRN- x1           Physical Exam:   Vitals were reviewed  Temp: 98.1  F (36.7  C) Temp src: Oral BP: 117/70 Pulse: 102   Resp: 12 SpO2: 97 % O2 Device: None (Room air)      Intake/Output Summary (Last 24 hours) at 12/10/2020 1026  Last data filed at 12/10/2020 0800  Gross per 24 hour   Intake 1220 ml   Output 1000 ml   Net 220 ml     Constitutional: lethargic, seen laying in bed, cooperative, no apparent distress  ENT: Normocephalic, without obvious  abnormality, atramatic  Lungs: No increased work of breathing, good air exchange, clear to auscultation bilaterally  Cardiovascular: Regular rate and rhythm, normal S1 and S2  Abdomen: normal bowel sounds, soft  Musculoskeletal: No redness, warmth, or swelling of the joints.    Neurologic: lethgargic, oriented to name, place and time.    Neuropsychiatric: Normal affect, mood, orientation, memory and insight.  Skin: No rashes, erythema, pallor, petechia or purpura.             Data Reviewed:     Reviewed recent pertinent imaging, comments:   None at this time, Pt is CMO    Reviewed recent labs, comments:   None at this time, Pt is CMO    CRISTINE Campbell CNS  Palliative Care Consult Team  Pager: [phone]    45 minutes spent. This includes 25 minutes face to face with patient, spouse, and sister discussing current complex health conditions, prognosis, goals of care, symptom management. Coordination of care with the primary team, palliative SW, and unit SW regarding goals of care and symptom management.

## 2020-12-10 NOTE — PLAN OF CARE
Neuro: A&Ox4. Slow to respond. Occasionally forgetful.   Cardiac: HR in 90's. Tele discontinued.    Respiratory: Sating @ 95% on RA.  GI/: Adequate urine output. Barrow patent. Small BM.   Diet/appetite: Tolerating Reg diet. No Appetite  Activity:  Assist of 1, up to chair and in halls.  Pain: Abd pain IV dilaudid given x2, PO 4mg PO Dilaudid given x1   Skin: No new deficits noted.  LDA's: Port infusing , PIV x1     Plan: Continue with POC. Notify primary team with changes.

## 2020-12-10 NOTE — PLAN OF CARE
Temp:  [97.9  F (36.6  C)-99.8  F (37.7  C)] 98.1  F (36.7  C)  Pulse:  [102-123] 102  Resp:  [12-18] 18  BP: (110-138)/(49-91) 112/49  SpO2:  [96 %-100 %] 97 %  Shift 6152-2846. Pt transitioned to comfort cares this afternoon. Stating she wants the pain to go away and that it would be ok if she is sedated or tired when her family gets here. Family are on their way now.   Neuro: Pt is either lethargic or restless. Forgetful.   Cardiac: tele discontinued. Pt was ST when it was on.   Respiratory:  RA, respirations WNL. Unlabored. RR 16-20.  GI/: rod with adequate UO. Last bm 12/8.  Diet/appetite: advanced to regular diet. Took small bite of dinner and finished. Drinking water adequately.   Activity: very unsteady on feet. Restless when in pain, in and out of bed.   Pain: abdominal pain. Iv dilaudid given as ordered.   Skin: no new deficits.       Continue with POC. Notify primary team with changes.

## 2020-12-11 NOTE — PROGRESS NOTES
DISCHARGE                         12/11/2020 12:32 PM  ----------------------------------------------------------------------------  Discharged to: Home  Via: Private transportation  Accompanied by: Family  Discharge Instructions: regular diet, activity as tolerated, medications, follow up appointments, when to call the MD, aftercare instructions.  Prescriptions: To be filled by [facility]; medication list reviewed & sent with pt  Follow Up Appointments: arranged; information given  Belongings: All sent with pt  IV: d/c'd, Ileana- de evan.  Telemetry: d/c'd  Pt exhibits understanding of above discharge instructions; all questions answered.    Discharge Paperwork: Signed, copied, and sent home with patient.

## 2020-12-11 NOTE — PROGRESS NOTES
[facility]     Medicine Progress Note - Hospitalist Service, Gold 11       Date of Admission:  12/8/2020  Assessment & Plan       Joesph Zeng is a 50 year old female with intrahepatic cholangiocarcinoma stage Marybeth  diagnosed in Oct 2019 on pallaitive gemzar/cisplatin since Aug 2020, last visit with  on Dec 04 2020 and started cycle 4 Day 1 that day,  who is presenting with abdominal pain with elevated lactic acid of 5.9 which is stable after fluid resucitation, CT abdomen pelvis that shows a necrotic pancreatic mass abutting on the duodenum third portion and portal vein occlusion and ischemic colitis in the transverse and descending colon.     # E.coli bacteremia  # Lactic acidosis  # Concern for infectious vs ischemic colitis vs Type B lactic acidosis from malignancy vs poor liver dysfunction  # Elevated bilirubin, mild transaminitis  # Acute encephalopathy related to sepsis, opioids, severe illness  CT showed:  1. lobular necrotic appearing lesion at the pancreatic head extending into the adjacent aspect of the liver. This measures up to 7.7 cm.    2.  Large necrotic retroperitoneal lesion abutting the third portion of the duodenum and inferior to the uncinate process of the pancreas. No pancreatic duct dilatation.    3.  Metastasis to the right lobe of the liver.    4.  Portal vein and portal confluence occlusion. The SMV and splenic vein appear patent. There are marked epigastric and retroperitoneal collateral vessels.    5.  Extrinsic compression on the inferior vena cava.    6.  Ascites.    7.  There is marked mural thickening and edema involving the transverse and descending colon. Appearance nonspecific however given infectious and ischemic etiologies are considered.    Blood culture positive for E.coli, started on Zosyn at OSH and did receive a dose of vancomycin as well. Surgery had been consulted, deemed not a good surgical candidate given prognosis,  "her severe liver dysfunction, and stage IV malignancy.  According to them, at this stage, plan would be to continue with IV fluids, antibiotics and bowel rest with close medical monitoring.  - discussed with family and patient - pursuing comfort cares, all labs, vitals, IVs stopped. Stop IV dilaudid in preparation for home.  - Home hospice tomorrow         Goals of care conference on admission:  \"Had a detailed goals of care discussion with patient and catrina (fam member patient identified) via iPad. Discussed that her lactic acidosis, the fact that she is not a surgical candidate and her underlying liver dysfunction and stage IV disease are poor predictors. Patient stated that \" told her we can only treat you, we cannot heal you\". When asked what her end of life wishes would be, she stated she \"no resuscitation, no revival\". When asked specifically, she refused CPR and intubation.   - I asked her about wishes and her main goal is to go home and maximize time with her family. When asked if she decompensates, would you want to go the ICU to try and prolong your life or be kept comfortable even if that means you pass away quicker, she stated \"keep me comfortable\". Discussed maximizing medical supportive care in order to transition her home.  - She is afraid that dying will be painful and wants us to do our best to keep her pain free. Assured her that we will maximize comfort both inpatient and outpatient  - Family and patient know we will have care conference in AM once we have more data of overnight vitals and labs. She is willing to discuss hospice, plan overnight is no escalation of care, if deteriorates, will call fiance asap.\"     She is doing better than expected today. Anticipate she will survive discharge to home hospice tomorrow.        Diet: Regular Diet Adult    DVT Prophylaxis: none  Barrow Catheter: in place, indication: Strict 1-2 Hour I&O  Code Status: No CPR- Do NOT Intubate       "         Disposition Plan     Expected discharge: comfort cares.  Entered: Adalgisa Pickett 12/10/2020, 7:11 PM       The patient's care was discussed with the Bedside Nurse, Patient and Patient's Family.    Adalgisa Pickett MD (Sally)  Internal Medicine/Pediatrics  Hospitalist    Hospitalist Service, [address]   Contact information available via [facility] Paging/Directory  Please see sign in/sign out for up to date coverage information  ______________________________________________________________________    Interval History   No events overnight. Able to eat some. Impulsive overnight. But overall today looking more awake and comfortable than expected. Some abd pain.     Data reviewed today: I reviewed all medications, new labs and imaging results over the last 24 hours. I personally reviewed no images or EKG's today.    Physical Exam   Vital Signs: Temp: 98.2  F (36.8  C) Temp src: Oral BP: 110/54 Pulse: 99   Resp: 14 SpO2: 95 % O2 Device: None (Room air)    Weight: 229 lbs 11.51 oz    General: awake, alert, in no acute distress, sitting up in bed, seems tired  HEENT: NCAT, EOMI, sclera anicteric, no nasal discharge  Resp: no increased WOB  Skin: warm, dry, no jaundice  Neuro: awake, talking    Data    No new labs.     Medications     - MEDICATION INSTRUCTIONS -         FLUoxetine  20 mg Oral BID     heparin  5 mL Intracatheter Q28 Days     heparin lock flush  5-10 mL Intracatheter Q24H     sodium chloride (PF)  3 mL Intracatheter Q8H

## 2020-12-11 NOTE — PROGRESS NOTES
Care Management Discharge Note    Discharge Date: Today - 12/11/20  Expected Time of Departure: 1230 via  [employer] (Ph: [phone]) - stretcher   -PCS Form completed and placed in paper chart; copy faxed to Billing dept    Discharge Disposition: Return home with Hospice and 24/7 family assist     Discharge Address: [address] [place] [address]  Hospice Agency: [facility] (Ph: [phone], F: [phone])  Hospice SOC: 12/11/2020 @ 1300 at Pt's home  Hospice DME: Hospital bed ordered and delivered yesterday evening    Private pay costs discussed: transportation costs    Education Provided on the Discharge Plan:  yes  Persons Notified of Discharge Plans: Pt, Pt's family, 6B Nursing Staff, [address]  Patient/Family in Agreement with the Plan: yes     Staff Discharge Instructions:  Please send medications with Pt from the [facility]    Addendum @ 1105: ALFREDITO updated that family now feels comfortable providing discharge transport and requested SW cancel transport that had been arranged. SW cancelled transport and Pt will discharge with family. ALFREDITO updated [facility].    ROC Salgado, LICSW  6B Intermediate Care Unit   [facility]  Phone: [phone]  Pager: [phone]

## 2020-12-11 NOTE — PLAN OF CARE
Neuro: A&Ox4. Slow to respond. Whispers.  Cardiac: HR in 90's. Tele discontinued.    Respiratory: Sating @ 95% on RA.  GI/: Adequate urine output. Barrow patent. Small BM's. Compazine and Zofran given for nausea.   Diet/appetite: Reg diet. No Appetite. Drinking adequate amount of fluids.   Activity:  Assist of 1, up to chair and commode  Pain: PO Dilaudid given 2mg given x2.   Skin: No new deficits noted.  LDA's: Port hep locked, PIV x1     Plan: Discharge with hospice today. Will continue POC.

## 2020-12-11 NOTE — PLAN OF CARE
Shift 8053-9187.   Comfort cares. Pt has had family members visiting throughout the day and are supportive and involved.   Neuro: Pt is lethargic at times, when awake is oriented.  Cardiac: 1-2 edema to ankles.  Respiratory:  RA, respirations WNL. Unlabored. RR 16-20.  GI/: rod with adequate UO. A few small BMs.  Diet/appetite: regular diet, with small bites pt's abdominal pain increases. Did not eat today. Drinking some fluids.  Activity: 2 assist to commode and chair.  Pain: abdominal pain. Pain is better controlled today. Transitioned to PO dilaudid Q2.  Skin: no new deficits.       Plan to discharge to hospice tomorrow. Continue with POC. Notify primary team with changes.

## 2020-12-12 NOTE — DISCHARGE SUMMARY
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    Appointments on [place] and/or [facility] (with [facility] or [facility]   provider or service). Call [phone] if you haven't heard regarding   these appointments within 7 days of discharge.             Unresulted Labs Ordered in the Past 30 Days of this Admission     Date and Time Order Name Status Description    12/9/2020 0435 Blood culture Preliminary     12/9/2020 0435 Blood culture Preliminary           Discharge Disposition   Discharged to home with hospice  Condition at discharge: Terminal    Hospital Course   Joesph Zeng is a 50 year old female with intrahepatic cholangiocarcinoma stage Marybeth  diagnosed in Oct 2019 on pallaitive gemzar/cisplatin since Aug 2020, last visit with  on Dec 04 2020 and started cycle 4 Day 1 that day,  who is presenting with abdominal pain with elevated lactic acid of 5.9 which is stable after fluid resucitation, CT abdomen pelvis that shows a necrotic pancreatic mass abutting on the duodenum third portion and portal vein occlusion and ischemic colitis in the transverse and descending colon. Started on IV Zosyn for severe sepsis. Surgery consulted who  deemed patient not a surgical candidate even if she had ischemic bowel due to her serious cancer diagnosis on palliative chemo. She was also found to have E.coli bacteremia on initial blood cultures; cultures negative since.    Patient wished to pursue comfort cares and she was discharged home with hospice.     Consultations This Hospital Stay   MEDICATION HISTORY IP PHARMACY CONSULT  PALLIATIVE CARE ADULT IP CONSULT  PHARMACY TO DOSE VANCO  ADVANCE DIRECTIVE IP CONSULT  CARE MANAGEMENT / SOCIAL WORK IP CONSULT    Code Status   No CPR- Do NOT Intubate    Time Spent on this Encounter   I, Adalgisa Pickett, personally saw the patient today and spent less than or equal to 30 minutes discharging this patient.       Adalgisa Alexander (Alfonzo Pickett MD  Internal Medicine/Pediatrics  [facility] [address] [address]  Phone: [phone]  ______________________________________________________________________    Physical Exam   Vital Signs:             SpO2: 97 % O2 Device: None (Room air)    Weight: 229 lbs 11.51 oz    General: awake, alert, seems tired  HEENT: NCAT, EOMI, sclera icteric, no nasal discharge, MMM  Resp: comfortable on room air, mildly dyspneic with exertion  Abd: distended  MSK: No peripheral edema  Skin: warm, dry  Neuro: Alert and oriented x4.         Primary Care Physician   Physician No Ref-Primary    Discharge Orders      Home care nursing referral      Reason for your hospital stay    You were admitted to the hospital for an infection. You are going home on hospice.     Adult [facility] Follow-up and recommended labs and tests    Follow up with your hospice team.    Appointments on [place] and/or [facility] (with [facility] or [facility] provider or service). Call [phone] if you haven't heard regarding these appointments within 7 days of discharge.     Activity    Your activity upon discharge: activity as tolerated     When to contact your care team     Contact your palliative care team for any symptoms.     No CPR- Do NOT Intubate     Diet    Follow this diet upon discharge: Orders Placed This Encounter      Regular Diet Adult       Significant Results and Procedures   Most Recent 3 CBC's:  Recent Labs   Lab Test 12/09/20  0419 12/08/20  1555 12/04/20  0910   WBC 6.7 5.2 5.4   HGB 10.7* 12.1 12.0   * 102* 101*   PLT 55* 75* 91*     Most Recent 3 BMP's:  Recent Labs   Lab Test 12/09/20  0419 12/08/20  1555 12/04/20  0910    132* 138   POTASSIUM 3.1* 3.2* 2.3*   CHLORIDE 99 98 99   CO2 29 25 34*   BUN 24 28 16   CR 0.63 0.80 0.68   ANIONGAP 6 10 5   IMELDA 8.1* 8.1* 8.3*   * 183* 182*     Most Recent 2 LFT's:  Recent Labs   Lab Test 12/09/20  0419 12/08/20  1555   AST 41 57*   ALT 43 54*   ALKPHOS 69 88   BILITOTAL 3.2* 4.6*     Most Recent 3 INR's:  Recent Labs   Lab Test 12/09/20  0419 12/08/20  1555 10/23/20  0905   INR 1.84* 1.67* 1.36*     Most Recent 3 Troponin's:No lab results found.  Most Recent 6 Bacteria Isolates From Any Culture (See EPIC Reports for Culture Details):  Recent Labs   Lab Test 12/09/20  0458 12/09/20  0454 12/08/20  1555 03/05/20  1117   CULT No growth after 2 days No growth after 2 days Cultured on the 1st day of incubation:  Escherichia coli  *  Critical Value/Significant Value, preliminary result only, called to and read back by  She Mueller RN at 0538 12/9/20 hg    (Note)  POSITIVE for E.COLI by Verigene multiplex nucleic acid test. Final  identification and antimicrobial susceptibility testing will be  verified by standard methods. Verigene test will not distinguish  E.coli from Shigella species including S.dysenteriae, S.flexneri,  S.boydii, and S.sonnei. Specimens containing Shigella species or  E.coli will be reported as Positive for E.coli.    Specimen tested with Verigene multiplex, gram-negative blood culture  nucleic acid test for the following targets: Acinetobacter sp.,  Citrobacter sp., Enterobacter sp.,  Proteus sp., E. coli, K.  pneumoniae/oxytoca, P. aeruginosa, and the following resistance  markers: CTXM, KPC, NDM, VIM, IMP and OXA.    Critical Value/Significant Value called to and read back by RONEY ALVAREZ RN 12/9/20 0803 MK    10,000 to 50,000 colonies/mL  mixed urogenital rasheed  Susceptibility testing not routinely done       Most Recent Urinalysis:  Recent Labs   Lab Test 12/08/20  1909 03/05/20  1117   COLOR Yellow Yellow   APPEARANCE Clear Clear   URINEGLC Negative Negative   URINEBILI Negative Negative   URINEKETONE Negative Negative   SG 1.020 1.025   UBLD Moderate* Negative   URINEPH 6.5 6.0   PROTEIN Negative Negative   UROBILINOGEN  --  0.2   NITRITE Negative Negative   LEUKEST Negative Small*   RBCU 30* O - 2   WBCU 2 5-10*     Most Recent ESR & CRP:No lab results found.,   Results for orders placed or performed during the hospital encounter of 12/08/20   POC US ABDOMEN LIMITED    Impression    Limited Bedside Cardiac Ultrasound, performed and interpreted by me.   Indication: Weakness.  Parasternal long axis, parasternal short axis and apical 4 chamber views were acquired.   Image quality was not satisfactory though was able to obtain a parasternal long axis view.    Findings:    Global left ventricular function appears intact.  Chambers do not appear dilated.  There is no evidence of free fluid within the pericardium.    IMPRESSION: Grossly normal left ventricular function and chamber size.  No pericardial effusion.     XR Chest Port 1 View    Narrative    EXAM: XR CHEST PORT 1 VW  LOCATION: [facility]  DATE/TIME: 12/8/2020 6:41 PM    INDICATION: Sepsis  COMPARISON: CT chest 11/09/2020      Impression    IMPRESSION: Right IJ Port-A-Cath terminates at the cavoatrial junction. Normal heart size. No pleural effusion or pneumothorax. Low lung volumes but no evidence for CHF or pneumonia.       Discharge Medications   Discharge Medication List as of 12/11/2020 10:51 AM      START taking  these medications    Details   atropine 1 % ophthalmic solution Place 1-2 drops under the tongue every hour as needed for other (secretions), Disp-15 mL, R-0, E-Prescribe      prochlorperazine (COMPAZINE) 25 MG suppository Place 1 suppository (25 mg) rectally every 12 hours as needed for nausea or vomiting, Disp-10 suppository, R-0, E-Prescribe      HYDROmorphone (DILAUDID) 10 mg/mL (HIGH CONC) (INJ used PO) soln    *this was replaced with dilaudid tabs 2-4 mg every 2 hours as needed as our discharge pharmacy was not able to compound this. If hospice wishes to change to high concentration solution as patient's condition progresses, this may be filled by hospice. Place 0.2-0.4 mLs (2-4 mg) under the tongue every 2 hours as needed for moderate to severe pain (dyspnea), Disp-20 mL, R-0, Local Print         CONTINUE these medications which have CHANGED    Details   !! LORazepam (ATIVAN) 0.5 MG tablet Take 1-2 tablets (0.5-1 mg) by mouth every 3 hours as needed for anxiety, seizures or nausea (dyspnea), Disp-40 tablet, R-0, Local Print      !! LORazepam (ATIVAN) 0.5 MG tablet Place 1-2 tablets (0.5-1 mg) under the tongue every 3 hours as needed for anxiety, seizures or nausea (dyspnea), Disp-40 tablet, R-0, Local Print      ondansetron (ZOFRAN-ODT) 4 MG ODT tab Take 1 tablet (4 mg) by mouth every 6 hours as needed for nausea or vomiting, Disp-20 tablet, R-0, E-Prescribe      prochlorperazine (COMPAZINE) 10 MG tablet Take 1 tablet (10 mg) by mouth every 6 hours as needed for vomiting, Disp-20 tablet, R-0, E-Prescribe       !! - Potential duplicate medications found. Please discuss with provider.      STOP taking these medications       FLUoxetine 20 MG tablet Comments:   Reason for Stopping:         hydrochlorothiazide (HYDRODIURIL) 25 MG tablet Comments:   Reason for Stopping:         lidocaine-prilocaine (EMLA) 2.5-2.5 % external cream Comments:   Reason for Stopping:         naloxone (NARCAN) 4 MG/0.1ML nasal spray  Comments:   Reason for Stopping:         oxyCODONE IR (ROXICODONE) 10 MG tablet Comments:   Reason for Stopping:         polyethylene glycol (MIRALAX) powder Comments:   Reason for Stopping:             Allergies   No Known Allergies

## 2020-12-15 NOTE — TELEPHONE ENCOUNTER
[facility] Call Center    Phone Message    May a detailed message be left on voicemail: yes     Reason for Call: Medication Question or concern regarding medication   Prescription Clarification  Name of Medication: vemlidy 25mg    Prescribing Provider: Destiny   Pharmacy: [facility]   What on the order needs clarification? Pharmacist is wanting to get the ICD10 code for medication.           Action Taken: Message routed to:  [facility] ID    Travel Screening: Not Applicable

## 2020-12-15 NOTE — TELEPHONE ENCOUNTER
[facility] Call Center    Phone Message    May a detailed message be left on voicemail: yes     Reason for Call: Other: Pts fiance called looking to speak to someone about pt being in the hospital and missed taking some medications for a week. Pts fiance is looking for a call back with any concerns or suggestions with next steps in care     Action Taken: Message routed to:  [facility] ([facility]): ID    Travel Screening: Not Applicable

## 2020-12-17 NOTE — TELEPHONE ENCOUNTER
Elmer Dixon MD Danielson, Megan, RN   Caller: Unspecified (2 days ago,  9:05 AM)             Possibly, but based on review of the chart it looks like the patient went home on home hospice   I don't think there is any benefit from having the patient on TAF     Elmer Reza

## 2020-12-17 NOTE — PROGRESS NOTES
Returned call to daughter and left voicemail regarding her Question:  Can my mom have her potassium level checked and possibly have infusion of potassium as she did with last chemotherapy.  Hospice staff referred patient to Oncologist as they advised they would not be able to give her potassium.  Advised to daughter will pass message to Dr. Fraser.

## 2020-12-18 NOTE — PROGRESS NOTES
Writer connected with Juani; patient is requesting a check on her Potassium, as at her last chemotherapy visit, she received it due to low level and she felt better.  Patient is getting out of bed occasionally but is resting most of the day; at times cannot make it to the bathroom on time and looses control of bladder and bowel.    Discussed with Dr. Fraser, and agrees if will help patient have better quality of life can move forward with potassium check and infusion.  Order placed; [facility] unable to draw lab.  Will offer patient to come to clinic.

## 2021-02-28 ENCOUNTER — HEALTH MAINTENANCE LETTER (OUTPATIENT)
Age: 51
End: 2021-02-28

## 2021-04-24 ENCOUNTER — HEALTH MAINTENANCE LETTER (OUTPATIENT)
Age: 51
End: 2021-04-24

## 2021-05-25 ENCOUNTER — RECORDS - HEALTHEAST (OUTPATIENT)
Dept: ADMINISTRATIVE | Facility: CLINIC | Age: 51
End: 2021-05-25

## 2021-07-28 NOTE — PROGRESS NOTES
Infusion Nursing Note:  Joesph Zeng presents today for C2D1 Cisplatin/Gemzar.    Patient seen by provider today: Yes: Dr. Fraser   present during visit today: Not Applicable.    Note: N/A.    Intravenous Access:  Implanted Port.    Treatment Conditions:  Lab Results   Component Value Date    HGB 12.3 09/18/2020     Lab Results   Component Value Date    WBC 4.1 09/18/2020      Lab Results   Component Value Date    ANEU 2.6 09/18/2020     Lab Results   Component Value Date    PLT 83 09/18/2020      Lab Results   Component Value Date     09/18/2020                   Lab Results   Component Value Date    POTASSIUM 3.3 09/18/2020           Lab Results   Component Value Date    MAG 2.1 09/18/2020            Lab Results   Component Value Date    CR 0.55 09/18/2020                   Lab Results   Component Value Date    IMELDA 8.5 09/18/2020                Lab Results   Component Value Date    BILITOTAL 1.1 09/18/2020           Lab Results   Component Value Date    ALBUMIN 3.0 09/18/2020                    Lab Results   Component Value Date    ALT 78 09/18/2020           Lab Results   Component Value Date    AST 70 09/18/2020       Results reviewed, labs MET treatment parameters, ok to proceed with treatment.    Post Infusion Assessment:  Patient tolerated infusion without incident.  Blood return noted pre and post infusion.  Site patent and intact, free from redness, edema or discomfort.  No evidence of extravasations.  Access discontinued per protocol.       Discharge Plan:   Patient will return in 2 weeks for next appointment.   Patient discharged in stable condition accompanied by: self.  Departure Mode: Ambulatory.    Hannah Love, RN-BSN, PHN, OCN  [facility]   [place] Pregnancy And Lactation Text: This medication is not safe during pregnancy. It is unknown if this medication is excreted in breast milk.

## (undated) DEVICE — TUBING SUCTION 12"X1/4" N612

## (undated) DEVICE — SUCTION CATH AIRLIFE TRI-FLO W/CONTROL PORT 14FR  T60C

## (undated) DEVICE — KIT ENDO TURNOVER/PROCEDURE CARRY-ON 101822

## (undated) DEVICE — ENDO BITE BLOCK ADULT OMNI-BLOC

## (undated) DEVICE — GOWN IMPERVIOUS 2XL BLUE

## (undated) DEVICE — SOL WATER IRRIG 1000ML BOTTLE 2F7114

## (undated) DEVICE — GLOVE EXAM NITRILE LG PF LATEX FREE 5064

## (undated) DEVICE — SUCTION MANIFOLD NEPTUNE 2 SYS 1 PORT 702-025-000

## (undated) DEVICE — SYR 30ML SLIP TIP W/O NDL 302833

## (undated) RX ORDER — LIDOCAINE HYDROCHLORIDE 10 MG/ML
INJECTION, SOLUTION EPIDURAL; INFILTRATION; INTRACAUDAL; PERINEURAL
Status: DISPENSED
Start: 2020-01-01

## (undated) RX ORDER — SODIUM CHLORIDE 9 MG/ML
INJECTION, SOLUTION INTRAVENOUS
Status: DISPENSED
Start: 2020-01-01

## (undated) RX ORDER — FENTANYL CITRATE 50 UG/ML
INJECTION, SOLUTION INTRAMUSCULAR; INTRAVENOUS
Status: DISPENSED
Start: 2020-01-01